# Patient Record
Sex: MALE | Race: WHITE | NOT HISPANIC OR LATINO | Employment: OTHER | URBAN - METROPOLITAN AREA
[De-identification: names, ages, dates, MRNs, and addresses within clinical notes are randomized per-mention and may not be internally consistent; named-entity substitution may affect disease eponyms.]

---

## 2018-01-11 ENCOUNTER — HOSPITAL ENCOUNTER (EMERGENCY)
Facility: HOSPITAL | Age: 48
Discharge: HOME | End: 2018-01-12
Attending: EMERGENCY MEDICINE | Admitting: EMERGENCY MEDICINE

## 2018-01-11 ENCOUNTER — APPOINTMENT (OUTPATIENT)
Dept: GENERAL RADIOLOGY | Facility: HOSPITAL | Age: 48
End: 2018-01-11

## 2018-01-11 DIAGNOSIS — R07.9 CHEST PAIN, UNSPECIFIED TYPE: Primary | ICD-10-CM

## 2018-01-11 LAB
ALBUMIN SERPL-MCNC: 5.1 G/DL (ref 3.2–4.8)
ALBUMIN/GLOB SERPL: 1.7 G/DL (ref 1.5–2.5)
ALP SERPL-CCNC: 71 U/L (ref 25–100)
ALT SERPL W P-5'-P-CCNC: 57 U/L (ref 7–40)
ANION GAP SERPL CALCULATED.3IONS-SCNC: 10 MMOL/L (ref 3–11)
AST SERPL-CCNC: 37 U/L (ref 0–33)
BASOPHILS # BLD AUTO: 0.02 10*3/MM3 (ref 0–0.2)
BASOPHILS NFR BLD AUTO: 0.2 % (ref 0–1)
BILIRUB SERPL-MCNC: 0.4 MG/DL (ref 0.3–1.2)
BNP SERPL-MCNC: 6 PG/ML (ref 0–100)
BUN BLD-MCNC: 20 MG/DL (ref 9–23)
BUN/CREAT SERPL: 18.2 (ref 7–25)
CALCIUM SPEC-SCNC: 9.9 MG/DL (ref 8.7–10.4)
CHLORIDE SERPL-SCNC: 104 MMOL/L (ref 99–109)
CO2 SERPL-SCNC: 22 MMOL/L (ref 20–31)
CREAT BLD-MCNC: 1.1 MG/DL (ref 0.6–1.3)
DEPRECATED RDW RBC AUTO: 45.5 FL (ref 37–54)
EOSINOPHIL # BLD AUTO: 0.16 10*3/MM3 (ref 0–0.3)
EOSINOPHIL NFR BLD AUTO: 1.7 % (ref 0–3)
ERYTHROCYTE [DISTWIDTH] IN BLOOD BY AUTOMATED COUNT: 13.4 % (ref 11.3–14.5)
GFR SERPL CREATININE-BSD FRML MDRD: 71 ML/MIN/1.73
GLOBULIN UR ELPH-MCNC: 3 GM/DL
GLUCOSE BLD-MCNC: 114 MG/DL (ref 70–100)
HCT VFR BLD AUTO: 45.1 % (ref 38.9–50.9)
HGB BLD-MCNC: 15.3 G/DL (ref 13.1–17.5)
IMM GRANULOCYTES # BLD: 0.05 10*3/MM3 (ref 0–0.03)
IMM GRANULOCYTES NFR BLD: 0.5 % (ref 0–0.6)
LIPASE SERPL-CCNC: 51 U/L (ref 6–51)
LYMPHOCYTES # BLD AUTO: 3.38 10*3/MM3 (ref 0.6–4.8)
LYMPHOCYTES NFR BLD AUTO: 36.1 % (ref 24–44)
MCH RBC QN AUTO: 31.6 PG (ref 27–31)
MCHC RBC AUTO-ENTMCNC: 33.9 G/DL (ref 32–36)
MCV RBC AUTO: 93.2 FL (ref 80–99)
MONOCYTES # BLD AUTO: 0.73 10*3/MM3 (ref 0–1)
MONOCYTES NFR BLD AUTO: 7.8 % (ref 0–12)
NEUTROPHILS # BLD AUTO: 5.01 10*3/MM3 (ref 1.5–8.3)
NEUTROPHILS NFR BLD AUTO: 53.7 % (ref 41–71)
PLATELET # BLD AUTO: 245 10*3/MM3 (ref 150–450)
PMV BLD AUTO: 11 FL (ref 6–12)
POTASSIUM BLD-SCNC: 3.6 MMOL/L (ref 3.5–5.5)
PROT SERPL-MCNC: 8.1 G/DL (ref 5.7–8.2)
RBC # BLD AUTO: 4.84 10*6/MM3 (ref 4.2–5.76)
SODIUM BLD-SCNC: 136 MMOL/L (ref 132–146)
TROPONIN I SERPL-MCNC: 0 NG/ML (ref 0–0.07)
WBC NRBC COR # BLD: 9.35 10*3/MM3 (ref 3.5–10.8)

## 2018-01-11 PROCEDURE — 85025 COMPLETE CBC W/AUTO DIFF WBC: CPT | Performed by: EMERGENCY MEDICINE

## 2018-01-11 PROCEDURE — 96374 THER/PROPH/DIAG INJ IV PUSH: CPT

## 2018-01-11 PROCEDURE — 83690 ASSAY OF LIPASE: CPT | Performed by: EMERGENCY MEDICINE

## 2018-01-11 PROCEDURE — 83880 ASSAY OF NATRIURETIC PEPTIDE: CPT | Performed by: EMERGENCY MEDICINE

## 2018-01-11 PROCEDURE — 93005 ELECTROCARDIOGRAM TRACING: CPT

## 2018-01-11 PROCEDURE — 80053 COMPREHEN METABOLIC PANEL: CPT | Performed by: EMERGENCY MEDICINE

## 2018-01-11 PROCEDURE — 84484 ASSAY OF TROPONIN QUANT: CPT

## 2018-01-11 PROCEDURE — 71045 X-RAY EXAM CHEST 1 VIEW: CPT

## 2018-01-11 PROCEDURE — 99284 EMERGENCY DEPT VISIT MOD MDM: CPT

## 2018-01-11 RX ORDER — ASPIRIN 81 MG/1
81 TABLET ORAL DAILY
COMMUNITY

## 2018-01-11 RX ORDER — SODIUM CHLORIDE 0.9 % (FLUSH) 0.9 %
10 SYRINGE (ML) INJECTION AS NEEDED
Status: DISCONTINUED | OUTPATIENT
Start: 2018-01-11 | End: 2018-01-12 | Stop reason: HOSPADM

## 2018-01-11 RX ORDER — FAMOTIDINE 10 MG/ML
20 INJECTION, SOLUTION INTRAVENOUS ONCE
Status: COMPLETED | OUTPATIENT
Start: 2018-01-11 | End: 2018-01-11

## 2018-01-11 RX ORDER — ALUMINA, MAGNESIA, AND SIMETHICONE 2400; 2400; 240 MG/30ML; MG/30ML; MG/30ML
15 SUSPENSION ORAL ONCE
Status: COMPLETED | OUTPATIENT
Start: 2018-01-11 | End: 2018-01-11

## 2018-01-11 RX ORDER — ASPIRIN 81 MG/1
324 TABLET, CHEWABLE ORAL ONCE
Status: COMPLETED | OUTPATIENT
Start: 2018-01-11 | End: 2018-01-11

## 2018-01-11 RX ORDER — OMEPRAZOLE 20 MG/1
20 CAPSULE, DELAYED RELEASE ORAL DAILY
COMMUNITY

## 2018-01-11 RX ADMIN — FAMOTIDINE 20 MG: 10 INJECTION, SOLUTION INTRAVENOUS at 23:42

## 2018-01-11 RX ADMIN — ALUMINUM HYDROXIDE, MAGNESIUM HYDROXIDE, AND DIMETHICONE 15 ML: 400; 400; 40 SUSPENSION ORAL at 23:42

## 2018-01-11 RX ADMIN — LIDOCAINE HYDROCHLORIDE 15 ML: 20 SOLUTION ORAL; TOPICAL at 23:42

## 2018-01-11 RX ADMIN — ASPIRIN 81 MG 81 MG: 81 TABLET ORAL at 22:54

## 2018-01-12 VITALS
DIASTOLIC BLOOD PRESSURE: 91 MMHG | SYSTOLIC BLOOD PRESSURE: 126 MMHG | RESPIRATION RATE: 18 BRPM | WEIGHT: 190 LBS | HEIGHT: 66 IN | TEMPERATURE: 98.6 F | BODY MASS INDEX: 30.53 KG/M2 | HEART RATE: 62 BPM | OXYGEN SATURATION: 95 %

## 2018-01-12 LAB
HOLD SPECIMEN: NORMAL
HOLD SPECIMEN: NORMAL
TROPONIN I SERPL-MCNC: 0 NG/ML (ref 0–0.07)
WHOLE BLOOD HOLD SPECIMEN: NORMAL
WHOLE BLOOD HOLD SPECIMEN: NORMAL

## 2018-01-12 PROCEDURE — 84484 ASSAY OF TROPONIN QUANT: CPT

## 2018-01-12 PROCEDURE — 93005 ELECTROCARDIOGRAM TRACING: CPT | Performed by: EMERGENCY MEDICINE

## 2018-01-12 RX ORDER — SUCRALFATE 1 G/1
1 TABLET ORAL
Qty: 60 TABLET | Refills: 0 | Status: SHIPPED | OUTPATIENT
Start: 2018-01-12 | End: 2022-09-15 | Stop reason: HOSPADM

## 2018-01-12 RX ORDER — RANITIDINE 150 MG/1
150 TABLET ORAL 2 TIMES DAILY
Qty: 30 TABLET | Refills: 0 | OUTPATIENT
Start: 2018-01-12 | End: 2021-12-20

## 2018-01-12 NOTE — ED PROVIDER NOTES
Subjective   HPI Comments: Mr. Geovanny Barnett is a 47 y.o. male who presents to the ED with c/o chest pain. He notes of left sided chest pain and palpitation since 2200 tonight. He was laying down and watching TV when his symptoms onset and became concerned he was having a heart attack so he comes to the ER for evaluation. He states his heart was pounding hard, and now has some neck pressure and nausea. He does note of increased heat in his face and on his chest as well. He denies any other acute sx at this time. He does have a hx of GERD and MI. He was seen in [place] ER 2 days ago for the same sx with a negative workup.     Patient is a 47 y.o. male presenting with chest pain.   History provided by:  Patient  Chest Pain   Pain location:  L chest  Onset quality:  Sudden  Duration: Onset 2200 today.  Timing:  Unable to specify  Progression:  Unchanged  Chronicity:  New  Relieved by:  None tried  Worsened by:  Nothing  Ineffective treatments:  None tried  Associated symptoms: nausea and palpitations    Associated symptoms: no diaphoresis, no shortness of breath and no vomiting        Review of Systems   Constitutional: Negative for chills and diaphoresis.   Respiratory: Negative for shortness of breath.    Cardiovascular: Positive for chest pain and palpitations.   Gastrointestinal: Positive for nausea. Negative for vomiting.   Musculoskeletal: Positive for neck pain (pressure).   All other systems reviewed and are negative.      Past Medical History:   Diagnosis Date   • GERD (gastroesophageal reflux disease)    • Gout    • Hyperlipidemia    • Myocardial infarction        No Known Allergies    Past Surgical History:   Procedure Laterality Date   • CORONARY ANGIOPLASTY WITH STENT PLACEMENT     • HIP SURGERY Bilateral        History reviewed. No pertinent family history.    Social History     Social History   • Marital status: Single     Spouse name: N/A   • Number of children: N/A   • Years of education: N/A      Social History Main Topics   • Smoking status: Current Some Day Smoker   • Smokeless tobacco: None   • Alcohol use 7.2 oz/week     12 Cans of beer per week   • Drug use: No   • Sexual activity: Not Asked     Other Topics Concern   • None     Social History Narrative   • None         Objective   Physical Exam   Constitutional: He is oriented to person, place, and time. He appears well-developed and well-nourished. No distress.   HENT:   Head: Normocephalic and atraumatic.   Nose: Nose normal.   Eyes: Conjunctivae are normal. No scleral icterus.   Neck: Normal range of motion. Neck supple.   Cardiovascular: Normal rate, regular rhythm, normal heart sounds and intact distal pulses.    No murmur heard.  Pulmonary/Chest: Effort normal and breath sounds normal. No respiratory distress.   Abdominal: Soft. There is no tenderness.   Musculoskeletal: Normal range of motion.   Neurological: He is alert and oriented to person, place, and time.   Skin: Skin is warm and dry. He is not diaphoretic.   Psychiatric: He has a normal mood and affect. His behavior is normal.   Nursing note and vitals reviewed.      Procedures         ED Course  ED Course     EKG NSR.  CXR negative.  Labs benign.  Troponin negative x2.  Pain improved with GI meds.  Pt has history of CAD and of GERD.  This is not the same as his prior anginal pain.  Discussed results in detail with pt.  We discussed the benefits of inpatient vs outpatient workup of these symptoms.  He prefers outpatient which I think is reasonable.  Will treat with GI meds since it seemed to help but we discussed that close followup with Kelly is most important.                    MDM  Number of Diagnoses or Management Options  Chest pain, unspecified type:      Amount and/or Complexity of Data Reviewed  Clinical lab tests: ordered and reviewed  Tests in the radiology section of CPT®: reviewed and ordered  Independent visualization of images, tracings, or specimens: yes        Final  diagnoses:   Chest pain, unspecified type       Documentation assistance provided by geo SINHA.  Information recorded by the [email or web address] was done at my direction and has been verified and validated by me.     Blanquita Sinha  01/11/18 0837       Celestine Tovar MD  01/12/18 9411

## 2019-11-18 ENCOUNTER — OFFICE VISIT (OUTPATIENT)
Dept: CARDIAC SURGERY | Facility: CLINIC | Age: 49
End: 2019-11-18

## 2019-11-18 VITALS
DIASTOLIC BLOOD PRESSURE: 80 MMHG | HEART RATE: 77 BPM | HEIGHT: 66 IN | WEIGHT: 204 LBS | TEMPERATURE: 98.7 F | OXYGEN SATURATION: 98 % | SYSTOLIC BLOOD PRESSURE: 138 MMHG | BODY MASS INDEX: 32.78 KG/M2

## 2019-11-18 DIAGNOSIS — R91.1 LUNG NODULE: Primary | ICD-10-CM

## 2019-11-18 PROCEDURE — 99205 OFFICE O/P NEW HI 60 MIN: CPT | Performed by: THORACIC SURGERY (CARDIOTHORACIC VASCULAR SURGERY)

## 2019-11-18 NOTE — PROGRESS NOTES
11/18/2019  Patient Information  Geovanny Barnett                                                                                          [address] [facility] [facility] [address]   1970  'PCP/Referring Physician'  David Jaffe MD  [phone]  David Jaffe MD  [phone]  Chief Complaint   Patient presents with   • Consult     New patient per Dr. david Jaffe for left upper lung nodule. Nodule found by accident while pt was having a CT done for a  kidney stone.       History of Present Illness:   The patient is a 49-year-old white male who is being referred for 9 mm lingular nodule.  This recently was found on evaluation for a kidney stone.  Patient has a long smoking history but has been quit for a while.  He denies any hemoptysis or shortness of breath or pulmonary symptoms at the current time.  He denies a family history of lung cancer.  The nodule was 9 mm, irregular, noncalcified and concerning for malignancy.      Patient Active Problem List   Diagnosis   • Lung nodule     Past Medical History:   Diagnosis Date   • GERD (gastroesophageal reflux disease)    • Gout    • Hyperlipidemia    • Myocardial infarction (CMS/HCC)      Past Surgical History:   Procedure Laterality Date   • CORONARY ANGIOPLASTY WITH STENT PLACEMENT     • HIP SURGERY Bilateral        Current Outpatient Medications:   •  ALLOPURINOL PO, Take  by mouth., Disp: , Rfl:   •  aspirin 81 MG EC tablet, Take 81 mg by mouth Daily., Disp: , Rfl:   •  omeprazole (priLOSEC) 20 MG capsule, Take 20 mg by mouth Daily., Disp: , Rfl:   •  raNITIdine (ZANTAC) 150 MG tablet, Take 1 tablet by mouth 2 (Two) Times a Day., Disp: 30 tablet, Rfl: 0  •  sucralfate (CARAFATE) 1 g tablet, Take 1 tablet by mouth 4 (Four) Times a Day Before Meals & at Bedtime., Disp: 60 tablet, Rfl: 0  No Known Allergies  Social History     Socioeconomic History   • Marital status: Single     Spouse name: Not on file   • Number of children: Not on file   • Years of  education: Not on file   • Highest education level: Not on file   Occupational History   • Occupation:      Comment: Disabled   Tobacco Use   • Smoking status: Former Smoker     Packs/day: 0.25     Years: 19.00     Pack years: 4.75     Last attempt to quit: 2016     Years since quitting: 3.8   • Smokeless tobacco: Never Used   Substance and Sexual Activity   • Alcohol use: Yes     Alcohol/week: 7.2 oz     Types: 12 Cans of beer per week   • Drug use: No   Social History Narrative    L[facility] in [facility]     Family History   Problem Relation Age of Onset   • Hypertension Mother    • Diabetes Mother    • Cancer Mother    • Cancer Father    • Heart valve disorder Father      Review of Systems   Constitution: Negative for chills, fever, malaise/fatigue, night sweats and weight loss.   HENT: Negative for congestion, hearing loss, nosebleeds and odynophagia.    Cardiovascular: Negative for chest pain, claudication, dyspnea on exertion, leg swelling, orthopnea, palpitations and syncope.   Respiratory: Negative for cough, hemoptysis, shortness of breath and wheezing.    Endocrine: Negative for cold intolerance, heat intolerance, polydipsia, polyphagia and polyuria.   Hematologic/Lymphatic: Does not bruise/bleed easily.   Skin: Negative for itching, poor wound healing and rash.   Musculoskeletal: Positive for arthritis, gout, joint pain (bilateral wrist and elbows) and muscle cramps (hamstrings bilateral). Negative for back pain, joint swelling and myalgias.   Gastrointestinal: Negative for abdominal pain, constipation, diarrhea, hematemesis, melena, nausea and vomiting.   Genitourinary: Negative for dysuria, frequency, hematuria, nocturia and urgency.   Neurological: Negative for dizziness, light-headedness, loss of balance and numbness.   Psychiatric/Behavioral: Negative for depression and suicidal ideas. The patient is not nervous/anxious.    Allergic/Immunologic: Positive for environmental  "allergies. Negative for HIV exposure.     Vitals:    11/18/19 1158   BP: 138/80   Pulse: 77   Temp: 98.7 °F (37.1 °C)   TempSrc: Temporal   SpO2: 98%   Weight: 92.5 kg (204 lb)   Height: 167.6 cm (66\")      Physical Exam   Constitutional: He is oriented to person, place, and time. He appears well-developed and well-nourished. No distress.   HENT:   Head: Normocephalic.   Eyes: EOM are normal. Pupils are equal, round, and reactive to light.   Neck: Normal range of motion. Carotid bruit is not present. No thyromegaly present.   Cardiovascular: Normal rate and regular rhythm. Exam reveals no gallop and no friction rub.   No murmur heard.  Pulmonary/Chest: He has no wheezes. He has no rales.   Abdominal: Soft. Bowel sounds are normal. He exhibits no distension and no mass. There is no hepatomegaly. There is no tenderness.   Musculoskeletal: Normal range of motion. He exhibits no deformity.   Neurological: He is alert and oriented to person, place, and time. He has normal strength. No cranial nerve deficit or sensory deficit.   Skin: No bruising and no petechiae noted. No cyanosis. Nails show no clubbing.   Psychiatric: He has a normal mood and affect.     Labs/Imaging:  I obtained and reviewed medical records from Dr. Jaffe including the CT scan demonstrating a 9 mm irregular, noncalcified pulmonary nodule in the lingula.    Assessment/Plan:   The patient is a 49-year-old male who has been referred for a 9 mm pulmonary nodule.  I have obtained and reviewed the CT scan.  I concur with a 9 mm irregular noncalcified pulmonary nodule in the lingula.  This certainly is very concerning.  I do think that we need to proceed with a PET scan.  I think it is too small for successful needle biopsy.  Depending on the results of the PET scan, further recommendations will be entertained.  I have answered all of the patient's questions and he appears to be fully informed and agreeable.  He is not smoking now.    Patient Active " Problem List   Diagnosis   • Lung nodule     CC: MD Elda Mays, , editing for Fer Zhou M.D.    I, Fer Zhou MD, have read and agree with the editing done by Elda Nolasco, .

## 2019-11-21 DIAGNOSIS — Z00.6 EXAMINATION FOR NORMAL COMPARISON FOR CLINICAL RESEARCH: Primary | ICD-10-CM

## 2019-12-03 DIAGNOSIS — Z00.6 EXAMINATION FOR NORMAL COMPARISON FOR CLINICAL RESEARCH: Primary | ICD-10-CM

## 2019-12-05 ENCOUNTER — HOSPITAL ENCOUNTER (OUTPATIENT)
Dept: PET IMAGING | Facility: HOSPITAL | Age: 49
Discharge: HOME | End: 2019-12-05
Admitting: PHYSICIAN ASSISTANT

## 2019-12-05 ENCOUNTER — HOSPITAL ENCOUNTER (OUTPATIENT)
Dept: PET IMAGING | Facility: HOSPITAL | Age: 49
Discharge: HOME | End: 2019-12-05

## 2019-12-05 LAB — GLUCOSE BLDC GLUCOMTR-MCNC: 116 MG/DL (ref 70–130)

## 2019-12-05 PROCEDURE — 0 FLUDEOXYGLUCOSE F18 SOLUTION: Performed by: PHYSICIAN ASSISTANT

## 2019-12-05 PROCEDURE — A9552 F18 FDG: HCPCS | Performed by: PHYSICIAN ASSISTANT

## 2019-12-05 PROCEDURE — 82962 GLUCOSE BLOOD TEST: CPT

## 2019-12-05 PROCEDURE — 78815 PET IMAGE W/CT SKULL-THIGH: CPT

## 2019-12-05 RX ADMIN — FLUDEOXYGLUCOSE F18 1 DOSE: 300 INJECTION INTRAVENOUS at 12:05

## 2019-12-09 ENCOUNTER — OFFICE VISIT (OUTPATIENT)
Dept: CARDIAC SURGERY | Facility: CLINIC | Age: 49
End: 2019-12-09

## 2019-12-09 VITALS
HEART RATE: 69 BPM | WEIGHT: 203 LBS | BODY MASS INDEX: 32.62 KG/M2 | TEMPERATURE: 98.6 F | HEIGHT: 66 IN | OXYGEN SATURATION: 97 % | SYSTOLIC BLOOD PRESSURE: 146 MMHG | DIASTOLIC BLOOD PRESSURE: 86 MMHG

## 2019-12-09 DIAGNOSIS — R91.1 LUNG NODULE: Primary | ICD-10-CM

## 2019-12-09 PROCEDURE — 99212 OFFICE O/P EST SF 10 MIN: CPT | Performed by: THORACIC SURGERY (CARDIOTHORACIC VASCULAR SURGERY)

## 2019-12-09 NOTE — PROGRESS NOTES
12/09/2019  Patient Information  Geovanny Barnett                                                                                          [address] [place] [address]   1970  'PCP/Referring Physician'  David Jaffe MD  [phone]  No ref. provider found    Chief Complaint   Patient presents with   • Follow-up     Follow up to discuss PET scan results.   • Lung Nodule       History of Present Illness:   The patient returns today for follow-up of his PET scan results.  He is not smoking.  He has been feeling reasonably well.  He denies hemoptysis or pulmonary symptoms.      Patient Active Problem List   Diagnosis   • Lung nodule     Past Medical History:   Diagnosis Date   • GERD (gastroesophageal reflux disease)    • Gout    • Hyperlipidemia    • Myocardial infarction (CMS/HCC)      Past Surgical History:   Procedure Laterality Date   • CORONARY ANGIOPLASTY WITH STENT PLACEMENT     • HIP SURGERY Bilateral        Current Outpatient Medications:   •  ALLOPURINOL PO, Take  by mouth., Disp: , Rfl:   •  aspirin 81 MG EC tablet, Take 81 mg by mouth Daily., Disp: , Rfl:   •  omeprazole (priLOSEC) 20 MG capsule, Take 20 mg by mouth Daily., Disp: , Rfl:   •  raNITIdine (ZANTAC) 150 MG tablet, Take 1 tablet by mouth 2 (Two) Times a Day., Disp: 30 tablet, Rfl: 0  •  sucralfate (CARAFATE) 1 g tablet, Take 1 tablet by mouth 4 (Four) Times a Day Before Meals & at Bedtime., Disp: 60 tablet, Rfl: 0  No Known Allergies  Social History     Socioeconomic History   • Marital status: Single     Spouse name: Not on file   • Number of children: Not on file   • Years of education: Not on file   • Highest education level: Not on file   Occupational History   • Occupation:      Comment: Disabled   Tobacco Use   • Smoking status: Former Smoker     Packs/day: 0.25     Years: 19.00     Pack years: 4.75     Last attempt to quit: 2016     Years since quitting: 3.9   • Smokeless tobacco: Never Used  "  Substance and Sexual Activity   • Alcohol use: Yes     Alcohol/week: 12.0 standard drinks     Types: 12 Cans of beer per week   • Drug use: No   Social History Narrative    Lives in [facility]     Family History   Problem Relation Age of Onset   • Hypertension Mother    • Diabetes Mother    • Cancer Mother    • Cancer Father    • Heart valve disorder Father      Review of Systems   Constitution: Negative. Negative for chills, fever, malaise/fatigue, night sweats and weight loss.   HENT: Positive for sore throat. Negative for hearing loss and odynophagia.    Eyes: Negative.    Cardiovascular: Negative for chest pain, dyspnea on exertion, leg swelling, orthopnea and palpitations.   Respiratory: Negative.  Negative for cough and hemoptysis.    Endocrine: Negative.  Negative for cold intolerance, heat intolerance, polydipsia, polyphagia and polyuria.   Hematologic/Lymphatic: Bruises/bleeds easily.   Skin: Negative.  Negative for itching and rash.   Musculoskeletal: Positive for arthritis, joint pain and muscle cramps. Negative for joint swelling and myalgias.   Gastrointestinal: Negative.  Negative for abdominal pain, constipation, diarrhea, hematemesis, hematochezia, melena, nausea and vomiting.   Genitourinary: Negative.  Negative for dysuria, frequency and hematuria.   Neurological: Negative for focal weakness, headaches, numbness and seizures.   Psychiatric/Behavioral: Negative.  Negative for suicidal ideas.   Allergic/Immunologic: Positive for environmental allergies.   All other systems reviewed and are negative.    Vitals:    12/09/19 1159   BP: 146/86   BP Location: Right arm   Patient Position: Sitting   Pulse: 69   Temp: 98.6 °F (37 °C)   SpO2: 97%   Weight: 92.1 kg (203 lb)   Height: 167.6 cm (66\")      Physical Exam   Neck: Normal range of motion. Neck supple. No JVD present. No tracheal deviation present. No thyromegaly present.   Cardiovascular: Normal rate and regular rhythm. Exam reveals no " gallop and no friction rub.   No murmur heard.  Pulmonary/Chest: No stridor. No respiratory distress. He has no wheezes. He has no rales. He exhibits no tenderness.   Abdominal: Soft. Bowel sounds are normal. There is no tenderness.   Lymphadenopathy:     He has no cervical adenopathy.     Assessment/Plan:   The patient returns today for follow-up of the results of his PET scan.  He had his PET scan done and this reveals no uptake in the lingular nodule.  We will see him back in 4 months and repeat a chest CT scan at that time.  I have answered all of his questions.    Patient Active Problem List   Diagnosis   • Lung nodule     CC: MD Elda Mays, , editing for Fer Zhou M.D.    I, Fer Zhou MD, have read and agree with the editing done by Elda Nolasco, .

## 2020-04-16 ENCOUNTER — TELEPHONE (OUTPATIENT)
Dept: CARDIAC SURGERY | Facility: CLINIC | Age: 50
End: 2020-04-16

## 2020-06-19 ENCOUNTER — TELEPHONE (OUTPATIENT)
Dept: CARDIAC SURGERY | Facility: CLINIC | Age: 50
End: 2020-06-19

## 2020-07-01 ENCOUNTER — TELEPHONE (OUTPATIENT)
Dept: CARDIAC SURGERY | Facility: CLINIC | Age: 50
End: 2020-07-01

## 2020-07-06 DIAGNOSIS — R91.1 LUNG NODULE: Primary | ICD-10-CM

## 2020-07-20 DIAGNOSIS — R91.1 LUNG NODULE: Primary | ICD-10-CM

## 2020-07-29 ENCOUNTER — HOSPITAL ENCOUNTER (OUTPATIENT)
Dept: CT IMAGING | Facility: HOSPITAL | Age: 50
Discharge: HOME | End: 2020-07-29
Admitting: PHYSICIAN ASSISTANT

## 2020-07-29 DIAGNOSIS — R91.1 LUNG NODULE: ICD-10-CM

## 2020-07-29 PROCEDURE — 71250 CT THORAX DX C-: CPT

## 2020-08-31 ENCOUNTER — OFFICE VISIT (OUTPATIENT)
Dept: CARDIAC SURGERY | Facility: CLINIC | Age: 50
End: 2020-08-31

## 2020-08-31 VITALS
HEART RATE: 65 BPM | BODY MASS INDEX: 32.17 KG/M2 | HEIGHT: 66 IN | WEIGHT: 200.2 LBS | DIASTOLIC BLOOD PRESSURE: 95 MMHG | SYSTOLIC BLOOD PRESSURE: 141 MMHG | OXYGEN SATURATION: 97 % | TEMPERATURE: 98.6 F

## 2020-08-31 DIAGNOSIS — R91.1 LUNG NODULE: Primary | ICD-10-CM

## 2020-08-31 PROCEDURE — 99213 OFFICE O/P EST LOW 20 MIN: CPT | Performed by: NURSE PRACTITIONER

## 2020-08-31 RX ORDER — PANTOPRAZOLE SODIUM 20 MG/1
TABLET, DELAYED RELEASE ORAL
Status: ON HOLD | COMMUNITY
End: 2022-09-13

## 2020-08-31 RX ORDER — ROSUVASTATIN CALCIUM 40 MG/1
40 TABLET, COATED ORAL NIGHTLY
COMMUNITY

## 2020-08-31 NOTE — PROGRESS NOTES
[facility] Cardiothoracic Surgery Follow-Up Note    Name:  Geovanny Barnett  MRN Number:  [id number]  Date of Encounter:  08/31/2020    Referred By:  No ref. provider found  PCP:  David Jaffe MD    Chief Complaint:    Chief Complaint   Patient presents with   • Follow-up     4 month follow up with CT chest for lung nodule       History of Present Illness:    Mr. Geovanny Barnett is a pleasant 50 y.o. male who presents today for evaluation of  a 9 mm lingular nodule.  This recently was found on evaluation for a kidney stone.  Patient has a long smoking history but has been quit for a while.  He denies any hemoptysis or shortness of breath or pulmonary symptoms at the current time.  He denies a family history of lung cancer.  The nodule was 9 mm, irregular, noncalcified and concerning for malignancy.  The lesion was negative on PET scan and the patient returns the office today to discuss the results of his recent CT scan of the chest.  He denies fever, chills, weight loss, night   Sweats, lymphadenopathy and hemoptysis.  Otherwise, he is doing well.    Review of Systems:  Review of Systems   Constitution: Negative for chills, diaphoresis, fever, malaise/fatigue and weight loss.   HENT: Negative for congestion, hearing loss, hoarse voice and sore throat.    Eyes: Negative for blurred vision and double vision.   Cardiovascular: Negative for chest pain, claudication, dyspnea on exertion, leg swelling, palpitations and syncope.   Respiratory: Negative for cough, hemoptysis, shortness of breath and wheezing.    Hematologic/Lymphatic: Does not bruise/bleed easily.   Skin: Negative for itching, poor wound healing and rash.   Musculoskeletal: Positive for arthritis, joint pain (Fingers), joint swelling and muscle cramps. Negative for back pain, falls, muscle weakness and neck pain.   Gastrointestinal: Negative for abdominal pain, constipation, diarrhea, hematemesis, nausea and vomiting.   Genitourinary: Negative  for dysuria, frequency, hematuria, hesitancy, incomplete emptying and urgency.   Neurological: Negative for dizziness, headaches, light-headedness, loss of balance, numbness and vertigo.   Psychiatric/Behavioral: Negative for depression, memory loss and substance abuse. The patient is not nervous/anxious.    Allergic/Immunologic: Positive for environmental allergies. Negative for HIV exposure.       Past Medical History:    Past Medical History:   Diagnosis Date   • GERD (gastroesophageal reflux disease)    • Gout    • Hyperlipidemia    • Myocardial infarction (CMS/HCC)        Past Surgical History:    Past Surgical History:   Procedure Laterality Date   • CORONARY ANGIOPLASTY WITH STENT PLACEMENT     • HIP SURGERY Bilateral        Patient Active Problem List   Diagnosis   • Lung nodule     Social History     Tobacco Use   • Smoking status: Former Smoker     Packs/day: 0.25     Years: 19.00     Pack years: 4.75     Last attempt to quit: 2016     Years since quittin.6   • Smokeless tobacco: Never Used   Substance Use Topics   • Alcohol use: Yes     Alcohol/week: 12.0 standard drinks     Types: 12 Cans of beer per week   • Drug use: No     Family History   Problem Relation Age of Onset   • Hypertension Mother    • Diabetes Mother    • Cancer Mother    • Cancer Father    • Heart valve disorder Father        Medications:      Current Outpatient Medications:   •  ALLOPURINOL PO, Take  by mouth., Disp: , Rfl:   •  aspirin 81 MG EC tablet, Take 81 mg by mouth Daily., Disp: , Rfl:   •  pantoprazole (PROTONIX) 20 MG EC tablet, pantoprazole, Disp: , Rfl:   •  rosuvastatin (Crestor) 40 MG tablet, Take 40 mg by mouth Daily., Disp: , Rfl:   •  sucralfate (CARAFATE) 1 g tablet, Take 1 tablet by mouth 4 (Four) Times a Day Before Meals & at Bedtime., Disp: 60 tablet, Rfl: 0  •  omeprazole (priLOSEC) 20 MG capsule, Take 20 mg by mouth Daily., Disp: , Rfl:   •  raNITIdine (ZANTAC) 150 MG tablet, Take 1 tablet by mouth 2 (Two)  "Times a Day., Disp: 30 tablet, Rfl: 0    Allergies:  No Known Allergies    Physical Exam:  Vital Signs:    Vitals:    08/31/20 1242   BP: 141/95   Pulse: 65   Temp: 98.6 °F (37 °C)   TempSrc: Infrared   SpO2: 97%   Weight: 90.8 kg (200 lb 3.2 oz)   Height: 167.6 cm (66\")       Physical Exam   Gen- NAD, pleasant, cooperative  CV- Regular rate and rhythm, no murmur, gallop or rub  Pulm- Clear to auscultation bilateral without wheeze or rhonchi  GI- Soft, normoactive bowel sounds, non-tender  Ext- Without edema   Neuro- CN II- XII grossly intact, tongue midline, voice normal.    Labs/Imaging:  CT Chest W/WO Contrast, [facility], 7/29/20  Impression:  Stable 1 cm noncalcified nodule seen within the left upper lobe inferiorly.  There is no additional parenchymal consolidation with degenerative changes seen within the spine.  Coronary artery calcification is identified.  I personally reviewed these images in the office today and note the only other nodules measures closer to approximately 9 to 10 mm.  This does remain stable.    Assessment / Plan:  Mr. Geovanny Barnett is a pleasant 50 y.o. male who presents today for evaluation of  a 9 mm lingular nodule.  This recently was found on evaluation for a kidney stone.  Patient has a long smoking history but has been quit for a while.  He denies any hemoptysis or shortness of breath or pulmonary symptoms at the current time.  He denies a family history of lung cancer.  The nodule was 9 mm, irregular, noncalcified and concerning for malignancy.  The lesion was negative on PET scan and the patient returns the office today to discuss the results of his recent CT scan of the chest.  He denies fever, chills, weight loss, night   Sweats, lymphadenopathy and hemoptysis.  I have discussed with the patient the results of his recent CT scan of the chest.  This nodule was measured at approximately 9 to 10 mm and is stable.  At this time, I will plan to see him back " in approximately 6 months with repeat CT scan of the chest.  Should he develop any acutely worsening symptoms, he will contact her office.    Please note, this document was produced using voice recognition software.    IRVIN Zepeda  [facility] Cardiothoracic Surgery

## 2021-02-25 ENCOUNTER — TELEPHONE (OUTPATIENT)
Dept: CARDIAC SURGERY | Facility: CLINIC | Age: 51
End: 2021-02-25

## 2021-03-09 ENCOUNTER — TELEPHONE (OUTPATIENT)
Dept: CARDIAC SURGERY | Facility: CLINIC | Age: 51
End: 2021-03-09

## 2021-03-09 NOTE — TELEPHONE ENCOUNTER
PATIENT READY FOR RECALL APPOINTMENT. PATIENT'S INSURANCE, ADDRESS, AND PHONE NUMBER VERIFIED.     **PATIENT WOULD LIKE ANY TESTING DONE AT [facility]

## 2021-03-11 DIAGNOSIS — R91.1 LUNG NODULE: Primary | ICD-10-CM

## 2021-03-17 DIAGNOSIS — R91.1 LUNG NODULE: Primary | ICD-10-CM

## 2021-04-07 ENCOUNTER — HOSPITAL ENCOUNTER (OUTPATIENT)
Dept: CT IMAGING | Facility: HOSPITAL | Age: 51
Discharge: HOME | End: 2021-04-07
Admitting: PHYSICIAN ASSISTANT

## 2021-04-07 DIAGNOSIS — R91.1 LUNG NODULE: ICD-10-CM

## 2021-04-07 PROCEDURE — 71250 CT THORAX DX C-: CPT

## 2021-04-08 ENCOUNTER — OFFICE VISIT (OUTPATIENT)
Dept: CARDIAC SURGERY | Facility: CLINIC | Age: 51
End: 2021-04-08

## 2021-04-08 VITALS
TEMPERATURE: 97.8 F | OXYGEN SATURATION: 98 % | DIASTOLIC BLOOD PRESSURE: 61 MMHG | BODY MASS INDEX: 33.43 KG/M2 | HEART RATE: 68 BPM | HEIGHT: 66 IN | WEIGHT: 208 LBS | SYSTOLIC BLOOD PRESSURE: 120 MMHG

## 2021-04-08 DIAGNOSIS — R91.1 LUNG NODULE: Primary | ICD-10-CM

## 2021-04-08 PROCEDURE — 99213 OFFICE O/P EST LOW 20 MIN: CPT | Performed by: THORACIC SURGERY (CARDIOTHORACIC VASCULAR SURGERY)

## 2021-04-08 NOTE — PROGRESS NOTES
04/08/2021  Patient Information  Geovanny Barnett                                                                                          [address] [facility] [facility] [address]   1970  'PCP/Referring Physician'  David Jaffe MD  No ref. provider found  Chief Complaint   Patient presents with   • Follow-up     6 mo f/u with CT chest        CC: I feel good    History of Present Illness: 51-year-old  male being seen in follow-up of a pulmonary nodule in the left lung (lingula).  This nodule was first identified 1-1/2 years ago on a routine chest x-ray.  Subsequent CT scan and PET scan were obtained and I have reviewed.  The PET scan shows no significant metabolic activity.  The CT scan shows a 9 mm irregular lesion in the lingula without significant calcification.  The patient states that he may have a history of histoplasmosis in childhood but he is not certain.  He has not had any other significant symptoms.  He denies cough, hemoptysis, and weight loss.  He stopped smoking 5 years ago.  He does have a history of coronary artery disease (status post stent placement).  I have reviewed his most recent CT scan.  The nodule in question is unchanged from prior examinations.  On close examination there may be some calcification in the lower aspect of this nodule.  CT scan of the chest is otherwise okay with no evidence of acute cardiovascular or pulmonary disease.      Patient Active Problem List   Diagnosis   • Lung nodule     Past Medical History:   Diagnosis Date   • GERD (gastroesophageal reflux disease)    • Gout    • Hyperlipidemia    • Myocardial infarction (CMS/HCC)      Past Surgical History:   Procedure Laterality Date   • CORONARY ANGIOPLASTY WITH STENT PLACEMENT     • HIP SURGERY Bilateral        Current Outpatient Medications:   •  ALLOPURINOL PO, Take  by mouth., Disp: , Rfl:   •  aspirin 81 MG EC tablet, Take 81 mg by mouth Daily., Disp: , Rfl:   •  omeprazole (priLOSEC) 20 MG  capsule, Take 20 mg by mouth Daily., Disp: , Rfl:   •  pantoprazole (PROTONIX) 20 MG EC tablet, pantoprazole, Disp: , Rfl:   •  raNITIdine (ZANTAC) 150 MG tablet, Take 1 tablet by mouth 2 (Two) Times a Day., Disp: 30 tablet, Rfl: 0  •  rosuvastatin (Crestor) 40 MG tablet, Take 40 mg by mouth Daily., Disp: , Rfl:   •  sucralfate (CARAFATE) 1 g tablet, Take 1 tablet by mouth 4 (Four) Times a Day Before Meals & at Bedtime., Disp: 60 tablet, Rfl: 0  No Known Allergies  Social History     Socioeconomic History   • Marital status: Single     Spouse name: Not on file   • Number of children: 0   • Years of education: Not on file   • Highest education level: Not on file   Tobacco Use   • Smoking status: Former Smoker     Packs/day: 0.25     Years: 19.00     Pack years: 4.75     Quit date:      Years since quittin.2   • Smokeless tobacco: Never Used   Substance and Sexual Activity   • Alcohol use: Yes     Alcohol/week: 12.0 standard drinks     Types: 12 Cans of beer per week   • Drug use: No     Family History   Problem Relation Age of Onset   • Hypertension Mother    • Diabetes Mother    • Cancer Mother    • Cancer Father    • Heart valve disorder Father        ROS, past medical history, surgical history, family history, social history and vitals  reviewed by me and corrected as needed.        Review of Systems   Constitutional: Negative for chills, fever, malaise/fatigue, night sweats and weight loss.   HENT: Negative for hearing loss, odynophagia and sore throat.    Cardiovascular: Negative for chest pain, dyspnea on exertion, leg swelling, orthopnea and palpitations.   Respiratory: Negative for cough and shortness of breath.    Hematologic/Lymphatic: Does not bruise/bleed easily.   Skin: Negative for itching and rash.   Musculoskeletal: Negative for arthritis, joint pain, joint swelling and myalgias.   Gastrointestinal: Negative for abdominal pain, constipation, diarrhea, hematemesis, hematochezia, nausea and  "vomiting.   Genitourinary: Negative for dysuria, frequency and hematuria.   Neurological: Negative for focal weakness, headaches, numbness and seizures.   Psychiatric/Behavioral: Negative for suicidal ideas.       Vitals:    04/08/21 0810   BP: 120/61   BP Location: Left arm   Pulse: 68   Temp: 97.8 °F (36.6 °C)   SpO2: 98%   Weight: 94.3 kg (208 lb)   Height: 167.6 cm (66\")        Physical Exam  Vitals and nursing note reviewed.   Constitutional:       General: He is not in acute distress.     Appearance: Normal appearance. He is normal weight.   HENT:      Head: Normocephalic and atraumatic.      Right Ear: External ear normal.      Left Ear: External ear normal.      Nose: Nose normal.      Mouth/Throat:      Mouth: Mucous membranes are moist.   Eyes:      Extraocular Movements: Extraocular movements intact.      Pupils: Pupils are equal, round, and reactive to light.   Neck:      Vascular: No carotid bruit.   Cardiovascular:      Rate and Rhythm: Normal rate and regular rhythm.      Pulses: Normal pulses.      Heart sounds: Normal heart sounds. No murmur heard.     Pulmonary:      Effort: Pulmonary effort is normal. No respiratory distress.      Breath sounds: No wheezing, rhonchi or rales.   Abdominal:      General: Abdomen is flat. Bowel sounds are normal.      Palpations: Abdomen is soft. There is no mass.      Tenderness: There is no abdominal tenderness. There is no guarding.   Musculoskeletal:         General: No swelling or tenderness.      Cervical back: Normal range of motion. No rigidity. No muscular tenderness.      Right lower leg: No edema.      Left lower leg: No edema.   Lymphadenopathy:      Cervical: No cervical adenopathy.   Skin:     General: Skin is warm and dry.      Capillary Refill: Capillary refill takes less than 2 seconds.      Coloration: Skin is not jaundiced.      Findings: No erythema.   Neurological:      General: No focal deficit present.      Mental Status: He is alert and " oriented to person, place, and time. Mental status is at baseline.   Psychiatric:         Mood and Affect: Mood normal.         Behavior: Behavior normal.         Thought Content: Thought content normal.         Judgment: Judgment normal.       Labs: None    Imaging: CT scan of the chest reviewed as noted in the present illness.  Comparison with previous PET scan as noted in the present illness.    Assessment: Pulmonary nodule in the lingula unchanged over the past 18 months    Plan: Return to clinic in 8 months (this will make 2 years) with a CT scan for final follow-up (assuming there is no change).        Milan Man MD  CTSurgery  04/08/21   10:36 EDT

## 2021-10-11 DIAGNOSIS — R91.1 LUNG NODULE: Primary | ICD-10-CM

## 2021-11-17 ENCOUNTER — HOSPITAL ENCOUNTER (OUTPATIENT)
Dept: CT IMAGING | Facility: HOSPITAL | Age: 51
Discharge: HOME | End: 2021-11-17
Admitting: NURSE PRACTITIONER

## 2021-11-17 ENCOUNTER — TELEPHONE (OUTPATIENT)
Dept: CARDIAC SURGERY | Facility: CLINIC | Age: 51
End: 2021-11-17

## 2021-11-17 DIAGNOSIS — R91.1 LUNG NODULE: ICD-10-CM

## 2021-11-17 PROCEDURE — 71250 CT THORAX DX C-: CPT

## 2021-11-17 NOTE — TELEPHONE ENCOUNTER
Jyoti from [facility] CT called stating this pt was there to have his CT done and she requested we order the CT without contrast instead of with and without due to the patient only having one kidney - all of his previous scans have been ordered with and without and ultimately done without, so I gave a verbal that the CT without contrast would be fine. We will try to order only without contrast scans in the future.

## 2021-12-09 ENCOUNTER — OFFICE VISIT (OUTPATIENT)
Dept: CARDIAC SURGERY | Facility: CLINIC | Age: 51
End: 2021-12-09

## 2021-12-09 VITALS
HEART RATE: 60 BPM | BODY MASS INDEX: 33.65 KG/M2 | TEMPERATURE: 97.8 F | WEIGHT: 209.4 LBS | OXYGEN SATURATION: 98 % | DIASTOLIC BLOOD PRESSURE: 95 MMHG | SYSTOLIC BLOOD PRESSURE: 147 MMHG | HEIGHT: 66 IN

## 2021-12-09 DIAGNOSIS — R91.1 LUNG NODULE: Primary | ICD-10-CM

## 2021-12-09 PROCEDURE — 99214 OFFICE O/P EST MOD 30 MIN: CPT | Performed by: NURSE PRACTITIONER

## 2021-12-09 NOTE — PROGRESS NOTES
[facility] Cardiothoracic Surgery Office Follow Up Note     Date of Encounter: 2021     Name: Geovanny Barnett  : 1970     Referred By: No ref. provider found  PCP: Champ Feng MD    Chief Complaint:    Chief Complaint   Patient presents with   • Lung Nodule     6 month follow-up with results from CT chest        Subjective      History of Present Illness:    Geovanny Barnett is a 51 y.o. male former smoker, with a history of HLD on statin therapy, MI, CAD s/p stent, and left lung nodule. This nodule was first identified 1-1/2 years ago on a routine chest x-ray.  Subsequent CT scan and PET scan were obtained showing no significant metabolic activity.  The CT scan showed a 9 mm irregular lesion in the lingula without significant calcification that was unchanged over 18 month period. Patient presents today for follow up with repeat chest CT. Patient has been doing well, denies any SOA, hemoptysis, unintentional weight loss, fevers, or lymphadenopathy. He does not smoke but reports he has frequent second hand smoke exposure.     Review of Systems:  Review of Systems   Constitutional: Negative for chills, decreased appetite, diaphoresis, fever, malaise/fatigue, night sweats and weight loss.   HENT: Negative for congestion, hoarse voice, sore throat and stridor.    Cardiovascular: Negative for chest pain, claudication, dyspnea on exertion, irregular heartbeat, leg swelling, near-syncope, orthopnea, palpitations, paroxysmal nocturnal dyspnea and syncope.   Respiratory: Negative for cough, hemoptysis, shortness of breath, sleep disturbances due to breathing, snoring, sputum production and wheezing.    Hematologic/Lymphatic: Negative for adenopathy and bleeding problem. Does not bruise/bleed easily.   Skin: Negative for color change, dry skin, itching, poor wound healing and rash.   Musculoskeletal: Negative for arthritis, back pain, falls and muscle weakness.   Gastrointestinal: Negative for  abdominal pain, anorexia, constipation, diarrhea, hematochezia, melena, nausea and vomiting.   Neurological: Negative for difficulty with concentration, disturbances in coordination, dizziness, loss of balance, numbness, seizures, vertigo and weakness.   Psychiatric/Behavioral: Negative for altered mental status, depression, memory loss and substance abuse. The patient does not have insomnia and is not nervous/anxious.    Allergic/Immunologic: Negative for persistent infections.       I have reviewed the following portions of the patient's history: allergies, current medications, past family history, past medical history, past social history, past surgical history, problem list and ROS and confirm it's accurate.    Allergies:  No Known Allergies    Medications:      Current Outpatient Medications:   •  ALLOPURINOL PO, Take  by mouth., Disp: , Rfl:   •  aspirin 81 MG EC tablet, Take 81 mg by mouth Daily., Disp: , Rfl:   •  omeprazole (priLOSEC) 20 MG capsule, Take 20 mg by mouth Daily., Disp: , Rfl:   •  pantoprazole (PROTONIX) 20 MG EC tablet, pantoprazole, Disp: , Rfl:   •  raNITIdine (ZANTAC) 150 MG tablet, Take 1 tablet by mouth 2 (Two) Times a Day., Disp: 30 tablet, Rfl: 0  •  rosuvastatin (Crestor) 40 MG tablet, Take 40 mg by mouth Daily., Disp: , Rfl:   •  sucralfate (CARAFATE) 1 g tablet, Take 1 tablet by mouth 4 (Four) Times a Day Before Meals & at Bedtime., Disp: 60 tablet, Rfl: 0    History:   Past Medical History:   Diagnosis Date   • GERD (gastroesophageal reflux disease)    • Gout    • Hyperlipidemia    • Myocardial infarction (HCC)        Past Surgical History:   Procedure Laterality Date   • CORONARY ANGIOPLASTY WITH STENT PLACEMENT     • HIP SURGERY Bilateral        Social History     Socioeconomic History   • Marital status: Single   • Number of children: 0   Tobacco Use   • Smoking status: Former Smoker     Packs/day: 0.25     Years: 19.00     Pack years: 4.75     Quit date: 2016     Years since  "quittin.9   • Smokeless tobacco: Never Used   Vaping Use   • Vaping Use: Never used   Substance and Sexual Activity   • Alcohol use: Yes     Alcohol/week: 12.0 standard drinks     Types: 12 Cans of beer per week   • Drug use: No        Family History   Problem Relation Age of Onset   • Hypertension Mother    • Diabetes Mother    • Cancer Mother    • Cancer Father    • Heart valve disorder Father        Objective     Physical Exam:  Vitals:    21 1059   BP: 147/95   BP Location: Left arm   Patient Position: Sitting   Pulse: 60   Temp: 97.8 °F (36.6 °C)   SpO2: 98%   Weight: 95 kg (209 lb 6.4 oz)   Height: 167.6 cm (66\")      Body mass index is 33.8 kg/m².    Physical Exam  Vitals and nursing note reviewed.   Constitutional:       Appearance: Normal appearance. He is well-developed.   HENT:      Head: Normocephalic and atraumatic.   Eyes:      Pupils: Pupils are equal, round, and reactive to light.   Neck:      Vascular: No carotid bruit.   Cardiovascular:      Rate and Rhythm: Normal rate and regular rhythm.      Pulses: Normal pulses.      Heart sounds: Normal heart sounds, S1 normal and S2 normal. No murmur heard.      Pulmonary:      Effort: Pulmonary effort is normal.      Breath sounds: Normal breath sounds.   Abdominal:      Palpations: Abdomen is soft.   Musculoskeletal:         General: No swelling.      Cervical back: Neck supple.      Right lower leg: No edema.      Left lower leg: No edema.   Skin:     General: Skin is warm and dry.      Capillary Refill: Capillary refill takes less than 2 seconds.      Findings: No bruising.   Neurological:      General: No focal deficit present.      Mental Status: He is alert and oriented to person, place, and time. Mental status is at baseline.      GCS: GCS eye subscore is 4. GCS verbal subscore is 5. GCS motor subscore is 6.      Motor: Motor function is intact.      Coordination: Coordination is intact.      Gait: Gait is intact.   Psychiatric:         " Mood and Affect: Mood normal.         Speech: Speech normal.         Behavior: Behavior normal. Behavior is cooperative.         Cognition and Memory: Cognition normal.         Imaging/Labs:    CT Chest Without Contrast Diagnostic    Result Date: 11/19/2021  Personally reviewed: stable 9 mm left upper lobe lung nodule.   A calcified granuloma identified measuring 9 mm in the left upper lobe. Remainder of the chest is stable.  DICTATED:   11/17/2021 EDITED/lfs:   11/17/2021   This report was finalized on 11/19/2021 4:15 PM by Dr. Cielo Linder MD.             Assessment / Plan      Assessment / Plan:  Diagnoses and all orders for this visit:    1. Lung nodule (Primary)       1. Lung nodules: Patient denies any acute complaints today or new consitutional symptoms. Reviewed patient's CT scan results with him, showing stable size of 9 mm left upper lobe nodule that is unchanged in appearance without any evidence of new nodules. At the time of Dr Man last visit with patient, he discussed that if nodule was stable, patient could continue surveillance with his pcp. Patient follows with PCP at [facility]. Discussed continuing surveillance of lung nodule with low dose CT Chest scans according to Fleischner guidelines with his PCP. Patient instructed to contact our office with an changes or concerns. Will plan to see the patient back on an as needed basis.       Follow Up:   Return if symptoms worsen or fail to improve.   Or sooner for any further concerns or worsening sign and symptoms. If unable to reach us in the office please dial 911 or go to the nearest emergency department.      Cassidy BRYAN  [facility] Cardiothoracic Surgery    Time Spent: I spent 31 minutes caring for Geovanny on this date of service. This time includes time spent by me in the following activities: preparing for the visit, reviewing tests, obtaining and/or reviewing a separately obtained history, performing a medically  appropriate examination and/or evaluation, counseling and educating the patient/family/caregiver, documenting information in the medical record and independently interpreting results and communicating that information with the patient/family/caregiver.

## 2021-12-20 ENCOUNTER — APPOINTMENT (OUTPATIENT)
Dept: GENERAL RADIOLOGY | Facility: HOSPITAL | Age: 51
End: 2021-12-20

## 2021-12-20 ENCOUNTER — HOSPITAL ENCOUNTER (EMERGENCY)
Facility: HOSPITAL | Age: 51
Discharge: HOME | End: 2021-12-20
Attending: STUDENT IN AN ORGANIZED HEALTH CARE EDUCATION/TRAINING PROGRAM | Admitting: STUDENT IN AN ORGANIZED HEALTH CARE EDUCATION/TRAINING PROGRAM

## 2021-12-20 VITALS
HEART RATE: 68 BPM | WEIGHT: 200 LBS | OXYGEN SATURATION: 94 % | DIASTOLIC BLOOD PRESSURE: 87 MMHG | RESPIRATION RATE: 18 BRPM | TEMPERATURE: 98 F | BODY MASS INDEX: 32.14 KG/M2 | HEIGHT: 66 IN | SYSTOLIC BLOOD PRESSURE: 123 MMHG

## 2021-12-20 DIAGNOSIS — R79.89 ELEVATED SERUM CREATININE: ICD-10-CM

## 2021-12-20 DIAGNOSIS — R07.9 CHEST PAIN, UNSPECIFIED TYPE: Primary | ICD-10-CM

## 2021-12-20 DIAGNOSIS — K21.9 GASTROESOPHAGEAL REFLUX DISEASE, UNSPECIFIED WHETHER ESOPHAGITIS PRESENT: ICD-10-CM

## 2021-12-20 LAB
ALBUMIN SERPL-MCNC: 4.9 G/DL (ref 3.5–5.2)
ALBUMIN/GLOB SERPL: 1.8 G/DL
ALP SERPL-CCNC: 82 U/L (ref 39–117)
ALT SERPL W P-5'-P-CCNC: 61 U/L (ref 1–41)
ANION GAP SERPL CALCULATED.3IONS-SCNC: 13 MMOL/L (ref 5–15)
AST SERPL-CCNC: 48 U/L (ref 1–40)
BASOPHILS # BLD AUTO: 0.04 10*3/MM3 (ref 0–0.2)
BASOPHILS NFR BLD AUTO: 0.5 % (ref 0–1.5)
BILIRUB SERPL-MCNC: 0.5 MG/DL (ref 0–1.2)
BUN SERPL-MCNC: 22 MG/DL (ref 6–20)
BUN/CREAT SERPL: 15.2 (ref 7–25)
CALCIUM SPEC-SCNC: 9.5 MG/DL (ref 8.6–10.5)
CHLORIDE SERPL-SCNC: 102 MMOL/L (ref 98–107)
CO2 SERPL-SCNC: 24 MMOL/L (ref 22–29)
CREAT SERPL-MCNC: 1.45 MG/DL (ref 0.76–1.27)
DEPRECATED RDW RBC AUTO: 44.2 FL (ref 37–54)
EOSINOPHIL # BLD AUTO: 0.16 10*3/MM3 (ref 0–0.4)
EOSINOPHIL NFR BLD AUTO: 1.8 % (ref 0.3–6.2)
ERYTHROCYTE [DISTWIDTH] IN BLOOD BY AUTOMATED COUNT: 13.2 % (ref 12.3–15.4)
GFR SERPL CREATININE-BSD FRML MDRD: 51 ML/MIN/1.73
GLOBULIN UR ELPH-MCNC: 2.8 GM/DL
GLUCOSE SERPL-MCNC: 130 MG/DL (ref 65–99)
HCT VFR BLD AUTO: 46.3 % (ref 37.5–51)
HGB BLD-MCNC: 15.8 G/DL (ref 13–17.7)
HOLD SPECIMEN: NORMAL
IMM GRANULOCYTES # BLD AUTO: 0.04 10*3/MM3 (ref 0–0.05)
IMM GRANULOCYTES NFR BLD AUTO: 0.5 % (ref 0–0.5)
LIPASE SERPL-CCNC: 46 U/L (ref 13–60)
LYMPHOCYTES # BLD AUTO: 3.65 10*3/MM3 (ref 0.7–3.1)
LYMPHOCYTES NFR BLD AUTO: 41.7 % (ref 19.6–45.3)
MCH RBC QN AUTO: 31.6 PG (ref 26.6–33)
MCHC RBC AUTO-ENTMCNC: 34.1 G/DL (ref 31.5–35.7)
MCV RBC AUTO: 92.6 FL (ref 79–97)
MONOCYTES # BLD AUTO: 0.55 10*3/MM3 (ref 0.1–0.9)
MONOCYTES NFR BLD AUTO: 6.3 % (ref 5–12)
NEUTROPHILS NFR BLD AUTO: 4.31 10*3/MM3 (ref 1.7–7)
NEUTROPHILS NFR BLD AUTO: 49.2 % (ref 42.7–76)
NRBC BLD AUTO-RTO: 0 /100 WBC (ref 0–0.2)
NT-PROBNP SERPL-MCNC: 12.1 PG/ML (ref 0–900)
PLATELET # BLD AUTO: 214 10*3/MM3 (ref 140–450)
PMV BLD AUTO: 10.8 FL (ref 6–12)
POTASSIUM SERPL-SCNC: 4.1 MMOL/L (ref 3.5–5.2)
PROT SERPL-MCNC: 7.7 G/DL (ref 6–8.5)
RBC # BLD AUTO: 5 10*6/MM3 (ref 4.14–5.8)
SODIUM SERPL-SCNC: 139 MMOL/L (ref 136–145)
TROPONIN T SERPL-MCNC: <0.01 NG/ML (ref 0–0.03)
TROPONIN T SERPL-MCNC: <0.01 NG/ML (ref 0–0.03)
WBC NRBC COR # BLD: 8.75 10*3/MM3 (ref 3.4–10.8)
WHOLE BLOOD HOLD SPECIMEN: NORMAL
WHOLE BLOOD HOLD SPECIMEN: NORMAL

## 2021-12-20 PROCEDURE — 83690 ASSAY OF LIPASE: CPT | Performed by: STUDENT IN AN ORGANIZED HEALTH CARE EDUCATION/TRAINING PROGRAM

## 2021-12-20 PROCEDURE — 25010000002 ONDANSETRON PER 1 MG: Performed by: STUDENT IN AN ORGANIZED HEALTH CARE EDUCATION/TRAINING PROGRAM

## 2021-12-20 PROCEDURE — 71045 X-RAY EXAM CHEST 1 VIEW: CPT

## 2021-12-20 PROCEDURE — 96374 THER/PROPH/DIAG INJ IV PUSH: CPT

## 2021-12-20 PROCEDURE — 96361 HYDRATE IV INFUSION ADD-ON: CPT

## 2021-12-20 PROCEDURE — 84484 ASSAY OF TROPONIN QUANT: CPT | Performed by: STUDENT IN AN ORGANIZED HEALTH CARE EDUCATION/TRAINING PROGRAM

## 2021-12-20 PROCEDURE — 80053 COMPREHEN METABOLIC PANEL: CPT | Performed by: STUDENT IN AN ORGANIZED HEALTH CARE EDUCATION/TRAINING PROGRAM

## 2021-12-20 PROCEDURE — 99283 EMERGENCY DEPT VISIT LOW MDM: CPT

## 2021-12-20 PROCEDURE — 93005 ELECTROCARDIOGRAM TRACING: CPT | Performed by: STUDENT IN AN ORGANIZED HEALTH CARE EDUCATION/TRAINING PROGRAM

## 2021-12-20 PROCEDURE — 83880 ASSAY OF NATRIURETIC PEPTIDE: CPT | Performed by: STUDENT IN AN ORGANIZED HEALTH CARE EDUCATION/TRAINING PROGRAM

## 2021-12-20 PROCEDURE — 93005 ELECTROCARDIOGRAM TRACING: CPT

## 2021-12-20 PROCEDURE — 36415 COLL VENOUS BLD VENIPUNCTURE: CPT

## 2021-12-20 PROCEDURE — 85025 COMPLETE CBC W/AUTO DIFF WBC: CPT

## 2021-12-20 RX ORDER — ASPIRIN 81 MG/1
324 TABLET, CHEWABLE ORAL ONCE
Status: COMPLETED | OUTPATIENT
Start: 2021-12-20 | End: 2021-12-20

## 2021-12-20 RX ORDER — FAMOTIDINE 20 MG/1
20 TABLET, FILM COATED ORAL 2 TIMES DAILY
Qty: 30 TABLET | Refills: 0 | Status: SHIPPED | OUTPATIENT
Start: 2021-12-20

## 2021-12-20 RX ORDER — SODIUM CHLORIDE 0.9 % (FLUSH) 0.9 %
10 SYRINGE (ML) INJECTION AS NEEDED
Status: DISCONTINUED | OUTPATIENT
Start: 2021-12-20 | End: 2021-12-20 | Stop reason: HOSPADM

## 2021-12-20 RX ORDER — ONDANSETRON 2 MG/ML
4 INJECTION INTRAMUSCULAR; INTRAVENOUS ONCE
Status: COMPLETED | OUTPATIENT
Start: 2021-12-20 | End: 2021-12-20

## 2021-12-20 RX ADMIN — ASPIRIN 324 MG: 81 TABLET, CHEWABLE ORAL at 02:17

## 2021-12-20 RX ADMIN — SODIUM CHLORIDE, POTASSIUM CHLORIDE, SODIUM LACTATE AND CALCIUM CHLORIDE 1000 ML: 600; 310; 30; 20 INJECTION, SOLUTION INTRAVENOUS at 02:53

## 2021-12-20 RX ADMIN — LIDOCAINE HYDROCHLORIDE: 20 SOLUTION OROPHARYNGEAL at 02:56

## 2021-12-20 RX ADMIN — ONDANSETRON 4 MG: 2 INJECTION INTRAMUSCULAR; INTRAVENOUS at 02:54

## 2021-12-20 NOTE — DISCHARGE INSTRUCTIONS
Take the provided antacid medication and see if it helps with symptoms.  As we discussed you should contact Dr. Briggs to discuss the mildly elevated creatinine at 1.45 today.  Please return to the ED or seek other medical care for any concerning symptoms.

## 2021-12-21 LAB
QT INTERVAL: 386 MS
QT INTERVAL: 402 MS
QTC INTERVAL: 404 MS
QTC INTERVAL: 424 MS

## 2021-12-21 NOTE — ED PROVIDER NOTES
EMERGENCY DEPARTMENT ENCOUNTER    Pt Name: Geovanny Barnett  MRN: [id number]  Pt :   1970  Room Number:    Date of encounter:  2021  PCP: Champ Feng MD  ED Provider: Zion Raza MD    Historian: Patient      HPI:  Chief Complaint: Chest pain        Context: Geovanny Barnett is a 51-year-old male with history of prior MI and GERD who presents because of chest tightness that started after dinner this evening.  He says years ago he had a heart attack and since then has been evaluated several times and was found to be just GERD which she currently manages with 20 mg of daily omeprazole.  He is describing moderate, substernal chest tightness without radiation.  He has not noticed any positional or exertional component to it.  Pain does wax and wane somewhat with time but he knows of nothing that makes it better.  He says this is different from his previous cardiac pain.  Denies recent fevers or illnesses.  No other complaints at this time.      PAST MEDICAL HISTORY  Past Medical History:   Diagnosis Date   • GERD (gastroesophageal reflux disease)    • Gout    • Hyperlipidemia    • Myocardial infarction (HCC)          PAST SURGICAL HISTORY  Past Surgical History:   Procedure Laterality Date   • CORONARY ANGIOPLASTY WITH STENT PLACEMENT     • HIP SURGERY Bilateral          FAMILY HISTORY  Family History   Problem Relation Age of Onset   • Hypertension Mother    • Diabetes Mother    • Cancer Mother    • Cancer Father    • Heart valve disorder Father          SOCIAL HISTORY  Social History     Socioeconomic History   • Marital status: Single   • Number of children: 0   Tobacco Use   • Smoking status: Former Smoker     Packs/day: 0.25     Years: 19.00     Pack years: 4.75     Quit date:      Years since quittin.9   • Smokeless tobacco: Never Used   Vaping Use   • Vaping Use: Never used   Substance and Sexual Activity   • Alcohol use: Yes     Alcohol/week: 12.0 standard drinks      Types: 12 Cans of beer per week   • Drug use: No         ALLERGIES  Patient has no known allergies.        REVIEW OF SYSTEMS  Review of Systems       All systems reviewed and negative except for those discussed in HPI.       PHYSICAL EXAM    I have reviewed the triage vital signs and nursing notes.    ED Triage Vitals [12/20/21 0123]   Temp Heart Rate Resp BP SpO2   98 °F (36.7 °C) 70 18 149/97 94 %      Temp src Heart Rate Source Patient Position BP Location FiO2 (%)   Oral Monitor Sitting Left arm --       Physical Exam  GENERAL:   Appears awake and alert in discomfort but no acute distress  HENT: Nares patent.  EYES: No scleral icterus.  CV: Regular rhythm, regular rate.  No murmurs rubs or gallops.  RESPIRATORY: Normal effort.  No audible wheezes, rales or rhonchi.  ABDOMEN: Soft, nontender  MUSCULOSKELETAL: No deformities.   NEURO: Alert, moves all extremities, follows commands.  SKIN: Warm, dry, no rash visualized.        LAB RESULTS  Recent Results (from the past 24 hour(s))   Troponin    Collection Time: 12/20/21  1:32 AM    Specimen: Blood   Result Value Ref Range    Troponin T <0.010 0.000 - 0.030 ng/mL   Comprehensive Metabolic Panel    Collection Time: 12/20/21  1:32 AM    Specimen: Blood   Result Value Ref Range    Glucose 130 (H) 65 - 99 mg/dL    BUN 22 (H) 6 - 20 mg/dL    Creatinine 1.45 (H) 0.76 - 1.27 mg/dL    Sodium 139 136 - 145 mmol/L    Potassium 4.1 3.5 - 5.2 mmol/L    Chloride 102 98 - 107 mmol/L    CO2 24.0 22.0 - 29.0 mmol/L    Calcium 9.5 8.6 - 10.5 mg/dL    Total Protein 7.7 6.0 - 8.5 g/dL    Albumin 4.90 3.50 - 5.20 g/dL    ALT (SGPT) 61 (H) 1 - 41 U/L    AST (SGOT) 48 (H) 1 - 40 U/L    Alkaline Phosphatase 82 39 - 117 U/L    Total Bilirubin 0.5 0.0 - 1.2 mg/dL    eGFR Non African Amer 51 (L) >60 mL/min/1.73    Globulin 2.8 gm/dL    A/G Ratio 1.8 g/dL    BUN/Creatinine Ratio 15.2 7.0 - 25.0    Anion Gap 13.0 5.0 - 15.0 mmol/L   Lipase    Collection Time: 12/20/21  1:32 AM    Specimen:  Blood   Result Value Ref Range    Lipase 46 13 - 60 U/L   BNP    Collection Time: 12/20/21  1:32 AM    Specimen: Blood   Result Value Ref Range    proBNP 12.1 0.0 - 900.0 pg/mL   Green Top (Gel)    Collection Time: 12/20/21  1:32 AM   Result Value Ref Range    Extra Tube Hold for add-ons.    Lavender Top    Collection Time: 12/20/21  1:32 AM   Result Value Ref Range    Extra Tube hold for add-on    Gold Top - SST    Collection Time: 12/20/21  1:32 AM   Result Value Ref Range    Extra Tube Hold for add-ons.    Gray Top    Collection Time: 12/20/21  1:32 AM   Result Value Ref Range    Extra Tube Hold for add-ons.    Light Blue Top    Collection Time: 12/20/21  1:32 AM   Result Value Ref Range    Extra Tube hold for add-on    CBC Auto Differential    Collection Time: 12/20/21  1:32 AM    Specimen: Blood   Result Value Ref Range    WBC 8.75 3.40 - 10.80 10*3/mm3    RBC 5.00 4.14 - 5.80 10*6/mm3    Hemoglobin 15.8 13.0 - 17.7 g/dL    Hematocrit 46.3 37.5 - 51.0 %    MCV 92.6 79.0 - 97.0 fL    MCH 31.6 26.6 - 33.0 pg    MCHC 34.1 31.5 - 35.7 g/dL    RDW 13.2 12.3 - 15.4 %    RDW-SD 44.2 37.0 - 54.0 fl    MPV 10.8 6.0 - 12.0 fL    Platelets 214 140 - 450 10*3/mm3    Neutrophil % 49.2 42.7 - 76.0 %    Lymphocyte % 41.7 19.6 - 45.3 %    Monocyte % 6.3 5.0 - 12.0 %    Eosinophil % 1.8 0.3 - 6.2 %    Basophil % 0.5 0.0 - 1.5 %    Immature Grans % 0.5 0.0 - 0.5 %    Neutrophils, Absolute 4.31 1.70 - 7.00 10*3/mm3    Lymphocytes, Absolute 3.65 (H) 0.70 - 3.10 10*3/mm3    Monocytes, Absolute 0.55 0.10 - 0.90 10*3/mm3    Eosinophils, Absolute 0.16 0.00 - 0.40 10*3/mm3    Basophils, Absolute 0.04 0.00 - 0.20 10*3/mm3    Immature Grans, Absolute 0.04 0.00 - 0.05 10*3/mm3    nRBC 0.0 0.0 - 0.2 /100 WBC   Troponin    Collection Time: 12/20/21  3:35 AM    Specimen: Blood   Result Value Ref Range    Troponin T <0.010 0.000 - 0.030 ng/mL       If labs were ordered, I independently reviewed the results.        RADIOLOGY  XR Chest 1  View    Result Date: 12/20/2021  CR Chest 1 Vw INDICATION: Chest pain today COMPARISON:  1/11/2018 FINDINGS: Portable AP view(s) of the chest.  The heart and mediastinal contours are normal. The lungs are clear. No pneumothorax or pleural effusion.     No acute cardiopulmonary findings. Signer Name: Bobby Billingsley MD  Signed: 12/20/2021 2:39 AM  Workstation Name: [facility]  [facility]      I ordered and reviewed the above noted radiographic studies.      I viewed images of chest x-ray which reveals no significant cardiomegaly, clear lungs, no abnormalities that I can appreciate.  See radiologist's dictation for official interpretation.        PROCEDURES    Procedures    ECG 12 Lead         ECG 12 Lead             MEDICATIONS GIVEN IN ER    Medications   aspirin chewable tablet 324 mg (324 mg Oral Given 12/20/21 0217)   lactated ringers bolus 1,000 mL (0 mL Intravenous Stopped 12/20/21 0353)   aluminum-magnesium hydroxide-simethicone (MAALOX MAX) 400-400-40 MG/5ML 30 mL, Lidocaine Viscous HCl (XYLOCAINE) 2 % 15 mL suspension ( Oral Given 12/20/21 0256)   ondansetron (ZOFRAN) injection 4 mg (4 mg Intravenous Given 12/20/21 0254)         PROGRESS, DATA ANALYSIS, CONSULTS, AND MEDICAL DECISION MAKING    All labs have been independently reviewed by me.  All radiology studies have been reviewed by me and the radiologist dictating the report.   EKG's have been independently viewed and interpreted by me.      Differential diagnoses: MI, angina, pneumonia, pneumothorax, CHF, pulmonary embolism, GERD, aortic dissection           In summary is a very nice 51-year-old male with history of prior MI and GERD who presents because of chest tightness that started after dinner this evening.  He says years ago he had a heart attack and since then has been evaluated several times and was found to be just GERD which she currently manages with 20 mg of daily omeprazole.  He is describing moderate, substernal chest  tightness without radiation.  He has not noticed any positional or exertional component to it.  Pain does wax and wane somewhat with time but he knows of nothing that makes it better.  He says this is different from his previous cardiac pain.  Denies recent fevers or illnesses.  No other complaints at this time.    He arrived well-appearing, hemodynamically stable vitals within normal limits.  Treated immediately with 324 aspirin.  Stat EKG generally reassuring with possible T wave flattening but no acute ST elevations or depressions, regular rate and rhythm, normal axis.  Chest x-ray is clear.  Mild hyperglycemia and elevated creatinine at 1.45 not meeting criteria for acute kidney injury when compared with prior.  0 and 2-hour high-sensitivity troponins are both not elevated.  BNP is also not elevated.  Heart score is 3.  He was treated symptomatically with GI cocktail, Zofran, IV lactated Ringer's bolus.  Upon reevaluation he says his pain is resolved and he felt immediate relief with a GI cocktail.  I believe this is more likely pain from GERD than cardiac in nature.  He is currently only taking 20 mg of daily omeprazole and will add daily famotidine to this to see if it helps with his symptoms.  We discussed his elevated creatinine which is particularly concerning because he has a solitary kidney congenitally.  He follows with Dr. Briggs here in town for his kidneys and he is agreeable to contacting Dr. Rodriguez first thing in the morning and letting him know that his creatinine here today was 1.45 to see if this is elevated significantly from his most recent values.  He was counseled on return precautions verbally expressed understanding of these.      AS OF 22:38 EST VITALS:    BP - 123/87  HR - 68  TEMP - 98 °F (36.7 °C) (Oral)  O2 SATS - 94%                  DIAGNOSIS  Final diagnoses:   Chest pain, unspecified type   Gastroesophageal reflux disease, unspecified whether esophagitis present   Elevated serum  creatinine         DISPOSITION  DISCHARGE    Patient discharged in stable condition.    Reviewed implications of results, diagnosis, meds, responsibility to follow up, warning signs and symptoms of possible worsening, potential complications and reasons to return to ER.    Patient/Family voiced understanding of above instructions.    Discussed plan for discharge, as there is no emergent indication for admission.  Pt/family is agreeable and understands need for follow up and possible repeat testing.  Pt/family is aware that discharge does not mean that nothing is wrong but that it indicates no emergency is currently present that requires admission and they must continue care with follow-up as given below or with a physician of their choice.     FOLLOW-UP  Champ Feng MD  [address] [facility] [address]  [place] [address]  [phone]    Call       Charles Briggs MD  [address] [facility] [address]  [place] [address]  [phone]    Call   To discuss creatinine of 1.45 on labs today.         Medication List      New Prescriptions    famotidine 20 MG tablet  Commonly known as: PEPCID  Take 1 tablet by mouth 2 (Two) Times a Day.        Stop    raNITIdine 150 MG tablet  Commonly known as: ZANTAC           Where to Get Your Medications      These medications were sent to [address] [place] [address] AT [address] [phone] [place] [id number] [place] [address]    Phone: [phone]   · famotidine 20 MG tablet                    Zion Raza MD  12/20/21 3170

## 2021-12-22 ENCOUNTER — HOSPITAL ENCOUNTER (EMERGENCY)
Facility: HOSPITAL | Age: 51
Discharge: HOME | End: 2021-12-22
Attending: EMERGENCY MEDICINE | Admitting: EMERGENCY MEDICINE

## 2021-12-22 ENCOUNTER — APPOINTMENT (OUTPATIENT)
Dept: CT IMAGING | Facility: HOSPITAL | Age: 51
End: 2021-12-22

## 2021-12-22 VITALS
OXYGEN SATURATION: 94 % | WEIGHT: 200 LBS | SYSTOLIC BLOOD PRESSURE: 127 MMHG | HEIGHT: 66 IN | TEMPERATURE: 98.1 F | HEART RATE: 67 BPM | BODY MASS INDEX: 32.14 KG/M2 | DIASTOLIC BLOOD PRESSURE: 93 MMHG | RESPIRATION RATE: 18 BRPM

## 2021-12-22 DIAGNOSIS — R10.9 ACUTE LEFT FLANK PAIN: Primary | ICD-10-CM

## 2021-12-22 LAB
ALBUMIN SERPL-MCNC: 5.1 G/DL (ref 3.5–5.2)
ALBUMIN/GLOB SERPL: 1.6 G/DL
ALP SERPL-CCNC: 80 U/L (ref 39–117)
ALT SERPL W P-5'-P-CCNC: 74 U/L (ref 1–41)
ANION GAP SERPL CALCULATED.3IONS-SCNC: 16 MMOL/L (ref 5–15)
AST SERPL-CCNC: 69 U/L (ref 1–40)
BACTERIA UR QL AUTO: NORMAL /HPF
BASOPHILS # BLD AUTO: 0.05 10*3/MM3 (ref 0–0.2)
BASOPHILS NFR BLD AUTO: 0.6 % (ref 0–1.5)
BILIRUB SERPL-MCNC: 0.8 MG/DL (ref 0–1.2)
BILIRUB UR QL STRIP: NEGATIVE
BUN SERPL-MCNC: 17 MG/DL (ref 6–20)
BUN/CREAT SERPL: 15.6 (ref 7–25)
CALCIUM SPEC-SCNC: 10.1 MG/DL (ref 8.6–10.5)
CHLORIDE SERPL-SCNC: 101 MMOL/L (ref 98–107)
CLARITY UR: CLEAR
CO2 SERPL-SCNC: 23 MMOL/L (ref 22–29)
COLOR UR: YELLOW
CREAT SERPL-MCNC: 1.09 MG/DL (ref 0.76–1.27)
DEPRECATED RDW RBC AUTO: 43.7 FL (ref 37–54)
EOSINOPHIL # BLD AUTO: 0.12 10*3/MM3 (ref 0–0.4)
EOSINOPHIL NFR BLD AUTO: 1.3 % (ref 0.3–6.2)
ERYTHROCYTE [DISTWIDTH] IN BLOOD BY AUTOMATED COUNT: 13 % (ref 12.3–15.4)
GFR SERPL CREATININE-BSD FRML MDRD: 71 ML/MIN/1.73
GLOBULIN UR ELPH-MCNC: 3.2 GM/DL
GLUCOSE SERPL-MCNC: 120 MG/DL (ref 65–99)
GLUCOSE UR STRIP-MCNC: NEGATIVE MG/DL
HCT VFR BLD AUTO: 46 % (ref 37.5–51)
HGB BLD-MCNC: 15.4 G/DL (ref 13–17.7)
HGB UR QL STRIP.AUTO: ABNORMAL
HYALINE CASTS UR QL AUTO: NORMAL /LPF
IMM GRANULOCYTES # BLD AUTO: 0.03 10*3/MM3 (ref 0–0.05)
IMM GRANULOCYTES NFR BLD AUTO: 0.3 % (ref 0–0.5)
KETONES UR QL STRIP: ABNORMAL
LEUKOCYTE ESTERASE UR QL STRIP.AUTO: NEGATIVE
LIPASE SERPL-CCNC: 31 U/L (ref 13–60)
LYMPHOCYTES # BLD AUTO: 3.17 10*3/MM3 (ref 0.7–3.1)
LYMPHOCYTES NFR BLD AUTO: 35.2 % (ref 19.6–45.3)
MCH RBC QN AUTO: 31.1 PG (ref 26.6–33)
MCHC RBC AUTO-ENTMCNC: 33.5 G/DL (ref 31.5–35.7)
MCV RBC AUTO: 92.9 FL (ref 79–97)
MONOCYTES # BLD AUTO: 0.59 10*3/MM3 (ref 0.1–0.9)
MONOCYTES NFR BLD AUTO: 6.6 % (ref 5–12)
NEUTROPHILS NFR BLD AUTO: 5.04 10*3/MM3 (ref 1.7–7)
NEUTROPHILS NFR BLD AUTO: 56 % (ref 42.7–76)
NITRITE UR QL STRIP: NEGATIVE
NRBC BLD AUTO-RTO: 0 /100 WBC (ref 0–0.2)
PH UR STRIP.AUTO: <=5 [PH] (ref 5–8)
PLATELET # BLD AUTO: 228 10*3/MM3 (ref 140–450)
PMV BLD AUTO: 10.4 FL (ref 6–12)
POTASSIUM SERPL-SCNC: 4.1 MMOL/L (ref 3.5–5.2)
PROT SERPL-MCNC: 8.3 G/DL (ref 6–8.5)
PROT UR QL STRIP: ABNORMAL
RBC # BLD AUTO: 4.95 10*6/MM3 (ref 4.14–5.8)
RBC # UR STRIP: NORMAL /HPF
REF LAB TEST METHOD: NORMAL
SODIUM SERPL-SCNC: 140 MMOL/L (ref 136–145)
SP GR UR STRIP: 1.02 (ref 1–1.03)
SQUAMOUS #/AREA URNS HPF: NORMAL /HPF
UROBILINOGEN UR QL STRIP: ABNORMAL
WBC # UR STRIP: NORMAL /HPF
WBC NRBC COR # BLD: 9 10*3/MM3 (ref 3.4–10.8)

## 2021-12-22 PROCEDURE — 74176 CT ABD & PELVIS W/O CONTRAST: CPT

## 2021-12-22 PROCEDURE — 83690 ASSAY OF LIPASE: CPT | Performed by: EMERGENCY MEDICINE

## 2021-12-22 PROCEDURE — 93005 ELECTROCARDIOGRAM TRACING: CPT | Performed by: EMERGENCY MEDICINE

## 2021-12-22 PROCEDURE — 99283 EMERGENCY DEPT VISIT LOW MDM: CPT

## 2021-12-22 PROCEDURE — 81001 URINALYSIS AUTO W/SCOPE: CPT | Performed by: EMERGENCY MEDICINE

## 2021-12-22 PROCEDURE — 80053 COMPREHEN METABOLIC PANEL: CPT | Performed by: EMERGENCY MEDICINE

## 2021-12-22 PROCEDURE — 85025 COMPLETE CBC W/AUTO DIFF WBC: CPT | Performed by: EMERGENCY MEDICINE

## 2021-12-22 PROCEDURE — 0 IOPAMIDOL PER 1 ML: Performed by: EMERGENCY MEDICINE

## 2021-12-22 PROCEDURE — 71275 CT ANGIOGRAPHY CHEST: CPT

## 2021-12-22 RX ADMIN — IOPAMIDOL 100 ML: 755 INJECTION, SOLUTION INTRAVENOUS at 05:41

## 2022-08-02 ENCOUNTER — TRANSCRIBE ORDERS (OUTPATIENT)
Dept: ADMINISTRATIVE | Facility: HOSPITAL | Age: 52
End: 2022-08-02

## 2022-08-02 DIAGNOSIS — Z11.59 SPECIAL SCREENING EXAMINATION FOR VIRAL DISEASE: Primary | ICD-10-CM

## 2022-08-02 DIAGNOSIS — K21.9 CHRONIC GERD: Primary | ICD-10-CM

## 2022-08-14 ENCOUNTER — APPOINTMENT (OUTPATIENT)
Dept: PREADMISSION TESTING | Facility: HOSPITAL | Age: 52
End: 2022-08-14

## 2022-08-16 ENCOUNTER — HOSPITAL ENCOUNTER (OUTPATIENT)
Facility: HOSPITAL | Age: 52
Setting detail: HOSPITAL OUTPATIENT SURGERY
Discharge: HOME | End: 2022-08-16
Attending: SURGERY | Admitting: SURGERY

## 2022-08-16 PROCEDURE — 91034 GASTROESOPHAGEAL REFLUX TEST: CPT | Performed by: SURGERY

## 2022-08-16 PROCEDURE — 91010 ESOPHAGUS MOTILITY STUDY: CPT | Performed by: SURGERY

## 2022-08-16 RX ORDER — LIDOCAINE HYDROCHLORIDE 20 MG/ML
SOLUTION OROPHARYNGEAL AS NEEDED
Status: DISCONTINUED | OUTPATIENT
Start: 2022-08-16 | End: 2022-08-16 | Stop reason: HOSPADM

## 2022-08-23 ENCOUNTER — APPOINTMENT (OUTPATIENT)
Dept: PREADMISSION TESTING | Facility: HOSPITAL | Age: 52
End: 2022-08-23

## 2022-08-23 ENCOUNTER — HOSPITAL ENCOUNTER (OUTPATIENT)
Facility: HOSPITAL | Age: 52
Setting detail: HOSPITAL OUTPATIENT SURGERY
Discharge: HOME | End: 2022-08-23
Attending: SURGERY | Admitting: SURGERY

## 2022-08-23 PROCEDURE — 91034 GASTROESOPHAGEAL REFLUX TEST: CPT | Performed by: SURGERY

## 2022-08-23 RX ORDER — LIDOCAINE HYDROCHLORIDE 20 MG/ML
SOLUTION OROPHARYNGEAL AS NEEDED
Status: DISCONTINUED | OUTPATIENT
Start: 2022-08-23 | End: 2022-08-23 | Stop reason: HOSPADM

## 2022-08-26 PROCEDURE — 88305 TISSUE EXAM BY PATHOLOGIST: CPT | Performed by: SURGERY

## 2022-08-29 ENCOUNTER — LAB REQUISITION (OUTPATIENT)
Dept: LAB | Facility: HOSPITAL | Age: 52
End: 2022-08-29

## 2022-08-29 DIAGNOSIS — K21.9 GASTRO-ESOPHAGEAL REFLUX DISEASE WITHOUT ESOPHAGITIS: ICD-10-CM

## 2022-08-30 LAB
CYTO UR: NORMAL
LAB AP CASE REPORT: NORMAL
LAB AP CLINICAL INFORMATION: NORMAL
PATH REPORT.FINAL DX SPEC: NORMAL
PATH REPORT.GROSS SPEC: NORMAL

## 2022-09-02 ENCOUNTER — HOSPITAL ENCOUNTER (OUTPATIENT)
Dept: NUCLEAR MEDICINE | Facility: HOSPITAL | Age: 52
Discharge: HOME | End: 2022-09-02

## 2022-09-02 DIAGNOSIS — K21.9 CHRONIC GERD: ICD-10-CM

## 2022-09-02 PROCEDURE — 0 TECHNETIUM SULFUR COLLOID: Performed by: SURGERY

## 2022-09-02 PROCEDURE — A9541 TC99M SULFUR COLLOID: HCPCS | Performed by: SURGERY

## 2022-09-02 PROCEDURE — 78264 GASTRIC EMPTYING IMG STUDY: CPT

## 2022-09-02 RX ADMIN — TECHNETIUM TC 99M SULFUR COLLOID 1 DOSE: KIT at 08:40

## 2022-09-08 ENCOUNTER — APPOINTMENT (OUTPATIENT)
Dept: GENERAL RADIOLOGY | Facility: HOSPITAL | Age: 52
End: 2022-09-08

## 2022-09-08 ENCOUNTER — HOSPITAL ENCOUNTER (EMERGENCY)
Facility: HOSPITAL | Age: 52
Discharge: HOME | End: 2022-09-08
Attending: EMERGENCY MEDICINE | Admitting: EMERGENCY MEDICINE

## 2022-09-08 VITALS
HEIGHT: 65 IN | DIASTOLIC BLOOD PRESSURE: 87 MMHG | WEIGHT: 200 LBS | TEMPERATURE: 98.3 F | SYSTOLIC BLOOD PRESSURE: 121 MMHG | RESPIRATION RATE: 18 BRPM | BODY MASS INDEX: 33.32 KG/M2 | OXYGEN SATURATION: 94 % | HEART RATE: 55 BPM

## 2022-09-08 DIAGNOSIS — R07.9 CHEST PAIN, UNSPECIFIED TYPE: Primary | ICD-10-CM

## 2022-09-08 LAB
ALBUMIN SERPL-MCNC: 4.9 G/DL (ref 3.5–5.2)
ALBUMIN/GLOB SERPL: 1.6 G/DL
ALP SERPL-CCNC: 79 U/L (ref 39–117)
ALT SERPL W P-5'-P-CCNC: 43 U/L (ref 1–41)
ANION GAP SERPL CALCULATED.3IONS-SCNC: 10 MMOL/L (ref 5–15)
AST SERPL-CCNC: 33 U/L (ref 1–40)
BASOPHILS # BLD AUTO: 0.04 10*3/MM3 (ref 0–0.2)
BASOPHILS NFR BLD AUTO: 0.5 % (ref 0–1.5)
BILIRUB SERPL-MCNC: 0.6 MG/DL (ref 0–1.2)
BUN SERPL-MCNC: 14 MG/DL (ref 6–20)
BUN/CREAT SERPL: 11.8 (ref 7–25)
CALCIUM SPEC-SCNC: 10.2 MG/DL (ref 8.6–10.5)
CHLORIDE SERPL-SCNC: 105 MMOL/L (ref 98–107)
CO2 SERPL-SCNC: 26 MMOL/L (ref 22–29)
CREAT SERPL-MCNC: 1.19 MG/DL (ref 0.76–1.27)
DEPRECATED RDW RBC AUTO: 43.1 FL (ref 37–54)
EGFRCR SERPLBLD CKD-EPI 2021: 72.6 ML/MIN/1.73
EOSINOPHIL # BLD AUTO: 0.08 10*3/MM3 (ref 0–0.4)
EOSINOPHIL NFR BLD AUTO: 1 % (ref 0.3–6.2)
ERYTHROCYTE [DISTWIDTH] IN BLOOD BY AUTOMATED COUNT: 13.2 % (ref 12.3–15.4)
GLOBULIN UR ELPH-MCNC: 3.1 GM/DL
GLUCOSE SERPL-MCNC: 111 MG/DL (ref 65–99)
HCT VFR BLD AUTO: 45.8 % (ref 37.5–51)
HGB BLD-MCNC: 15.7 G/DL (ref 13–17.7)
HOLD SPECIMEN: NORMAL
IMM GRANULOCYTES # BLD AUTO: 0.03 10*3/MM3 (ref 0–0.05)
IMM GRANULOCYTES NFR BLD AUTO: 0.4 % (ref 0–0.5)
LIPASE SERPL-CCNC: 31 U/L (ref 13–60)
LYMPHOCYTES # BLD AUTO: 2.52 10*3/MM3 (ref 0.7–3.1)
LYMPHOCYTES NFR BLD AUTO: 30.9 % (ref 19.6–45.3)
MCH RBC QN AUTO: 30.9 PG (ref 26.6–33)
MCHC RBC AUTO-ENTMCNC: 34.3 G/DL (ref 31.5–35.7)
MCV RBC AUTO: 90.2 FL (ref 79–97)
MONOCYTES # BLD AUTO: 0.34 10*3/MM3 (ref 0.1–0.9)
MONOCYTES NFR BLD AUTO: 4.2 % (ref 5–12)
NEUTROPHILS NFR BLD AUTO: 5.14 10*3/MM3 (ref 1.7–7)
NEUTROPHILS NFR BLD AUTO: 63 % (ref 42.7–76)
NRBC BLD AUTO-RTO: 0 /100 WBC (ref 0–0.2)
NT-PROBNP SERPL-MCNC: 32.2 PG/ML (ref 0–900)
PLATELET # BLD AUTO: 219 10*3/MM3 (ref 140–450)
PMV BLD AUTO: 10.3 FL (ref 6–12)
POTASSIUM SERPL-SCNC: 4.4 MMOL/L (ref 3.5–5.2)
PROT SERPL-MCNC: 8 G/DL (ref 6–8.5)
RBC # BLD AUTO: 5.08 10*6/MM3 (ref 4.14–5.8)
SODIUM SERPL-SCNC: 141 MMOL/L (ref 136–145)
TROPONIN T SERPL-MCNC: <0.01 NG/ML (ref 0–0.03)
TROPONIN T SERPL-MCNC: <0.01 NG/ML (ref 0–0.03)
WBC NRBC COR # BLD: 8.15 10*3/MM3 (ref 3.4–10.8)
WHOLE BLOOD HOLD COAG: NORMAL
WHOLE BLOOD HOLD SPECIMEN: NORMAL

## 2022-09-08 PROCEDURE — 36415 COLL VENOUS BLD VENIPUNCTURE: CPT

## 2022-09-08 PROCEDURE — 93005 ELECTROCARDIOGRAM TRACING: CPT

## 2022-09-08 PROCEDURE — 83880 ASSAY OF NATRIURETIC PEPTIDE: CPT

## 2022-09-08 PROCEDURE — 80053 COMPREHEN METABOLIC PANEL: CPT | Performed by: EMERGENCY MEDICINE

## 2022-09-08 PROCEDURE — 93005 ELECTROCARDIOGRAM TRACING: CPT | Performed by: EMERGENCY MEDICINE

## 2022-09-08 PROCEDURE — 71045 X-RAY EXAM CHEST 1 VIEW: CPT

## 2022-09-08 PROCEDURE — 85025 COMPLETE CBC W/AUTO DIFF WBC: CPT

## 2022-09-08 PROCEDURE — 99284 EMERGENCY DEPT VISIT MOD MDM: CPT

## 2022-09-08 PROCEDURE — 83690 ASSAY OF LIPASE: CPT | Performed by: EMERGENCY MEDICINE

## 2022-09-08 PROCEDURE — 84484 ASSAY OF TROPONIN QUANT: CPT | Performed by: EMERGENCY MEDICINE

## 2022-09-08 RX ORDER — SODIUM CHLORIDE 0.9 % (FLUSH) 0.9 %
10 SYRINGE (ML) INJECTION AS NEEDED
Status: DISCONTINUED | OUTPATIENT
Start: 2022-09-08 | End: 2022-09-08 | Stop reason: HOSPADM

## 2022-09-08 RX ORDER — ASPIRIN 81 MG/1
324 TABLET, CHEWABLE ORAL ONCE
Status: COMPLETED | OUTPATIENT
Start: 2022-09-08 | End: 2022-09-08

## 2022-09-08 RX ADMIN — ASPIRIN 81 MG CHEWABLE TABLET 324 MG: 81 TABLET CHEWABLE at 16:12

## 2022-09-08 NOTE — DISCHARGE INSTRUCTIONS
Follow-up with cardiology for repeat evaluation within the next week.    Return to the ER with any further concern.

## 2022-09-09 LAB
QT INTERVAL: 414 MS
QT INTERVAL: 440 MS
QTC INTERVAL: 402 MS
QTC INTERVAL: 409 MS

## 2022-09-13 ENCOUNTER — HOSPITAL ENCOUNTER (INPATIENT)
Facility: HOSPITAL | Age: 52
LOS: 1 days | Discharge: HOME | End: 2022-09-15
Attending: EMERGENCY MEDICINE | Admitting: INTERNAL MEDICINE

## 2022-09-13 ENCOUNTER — APPOINTMENT (OUTPATIENT)
Dept: GENERAL RADIOLOGY | Facility: HOSPITAL | Age: 52
End: 2022-09-13

## 2022-09-13 DIAGNOSIS — R11.0 NAUSEA: ICD-10-CM

## 2022-09-13 DIAGNOSIS — I25.110 CORONARY ARTERY DISEASE INVOLVING NATIVE CORONARY ARTERY OF NATIVE HEART WITH UNSTABLE ANGINA PECTORIS: ICD-10-CM

## 2022-09-13 DIAGNOSIS — I20.8 STABLE ANGINA PECTORIS: ICD-10-CM

## 2022-09-13 DIAGNOSIS — R07.9 NONSPECIFIC CHEST PAIN: Primary | ICD-10-CM

## 2022-09-13 PROBLEM — Q60.0 SOLITARY KIDNEY, CONGENITAL: Status: ACTIVE | Noted: 2022-09-13

## 2022-09-13 PROBLEM — K21.9 GERD (GASTROESOPHAGEAL REFLUX DISEASE): Status: ACTIVE | Noted: 2022-09-13

## 2022-09-13 PROBLEM — E78.5 HYPERLIPEMIA: Status: ACTIVE | Noted: 2022-09-13

## 2022-09-13 PROBLEM — I25.10 CAD (CORONARY ARTERY DISEASE): Status: ACTIVE | Noted: 2022-09-13

## 2022-09-13 PROBLEM — I10 ESSENTIAL HYPERTENSION: Status: ACTIVE | Noted: 2022-09-13

## 2022-09-13 LAB
ALBUMIN SERPL-MCNC: 4.7 G/DL (ref 3.5–5.2)
ALBUMIN/GLOB SERPL: 1.7 G/DL
ALP SERPL-CCNC: 78 U/L (ref 39–117)
ALT SERPL W P-5'-P-CCNC: 34 U/L (ref 1–41)
ANION GAP SERPL CALCULATED.3IONS-SCNC: 13 MMOL/L (ref 5–15)
AST SERPL-CCNC: 27 U/L (ref 1–40)
BASOPHILS # BLD AUTO: 0.04 10*3/MM3 (ref 0–0.2)
BASOPHILS NFR BLD AUTO: 0.4 % (ref 0–1.5)
BILIRUB SERPL-MCNC: 0.5 MG/DL (ref 0–1.2)
BUN SERPL-MCNC: 17 MG/DL (ref 6–20)
BUN/CREAT SERPL: 14.4 (ref 7–25)
CALCIUM SPEC-SCNC: 9.6 MG/DL (ref 8.6–10.5)
CHLORIDE SERPL-SCNC: 104 MMOL/L (ref 98–107)
CO2 SERPL-SCNC: 23 MMOL/L (ref 22–29)
CREAT SERPL-MCNC: 1.18 MG/DL (ref 0.76–1.27)
DEPRECATED RDW RBC AUTO: 43.2 FL (ref 37–54)
EGFRCR SERPLBLD CKD-EPI 2021: 73.3 ML/MIN/1.73
EOSINOPHIL # BLD AUTO: 0.13 10*3/MM3 (ref 0–0.4)
EOSINOPHIL NFR BLD AUTO: 1.2 % (ref 0.3–6.2)
ERYTHROCYTE [DISTWIDTH] IN BLOOD BY AUTOMATED COUNT: 13.2 % (ref 12.3–15.4)
GLOBULIN UR ELPH-MCNC: 2.8 GM/DL
GLUCOSE SERPL-MCNC: 91 MG/DL (ref 65–99)
HCT VFR BLD AUTO: 44.1 % (ref 37.5–51)
HGB BLD-MCNC: 15.2 G/DL (ref 13–17.7)
HOLD SPECIMEN: NORMAL
HOLD SPECIMEN: NORMAL
IMM GRANULOCYTES # BLD AUTO: 0.02 10*3/MM3 (ref 0–0.05)
IMM GRANULOCYTES NFR BLD AUTO: 0.2 % (ref 0–0.5)
LIPASE SERPL-CCNC: 35 U/L (ref 13–60)
LYMPHOCYTES # BLD AUTO: 3.59 10*3/MM3 (ref 0.7–3.1)
LYMPHOCYTES NFR BLD AUTO: 32.8 % (ref 19.6–45.3)
MCH RBC QN AUTO: 31.2 PG (ref 26.6–33)
MCHC RBC AUTO-ENTMCNC: 34.5 G/DL (ref 31.5–35.7)
MCV RBC AUTO: 90.6 FL (ref 79–97)
MONOCYTES # BLD AUTO: 0.63 10*3/MM3 (ref 0.1–0.9)
MONOCYTES NFR BLD AUTO: 5.8 % (ref 5–12)
NEUTROPHILS NFR BLD AUTO: 59.6 % (ref 42.7–76)
NEUTROPHILS NFR BLD AUTO: 6.54 10*3/MM3 (ref 1.7–7)
NRBC BLD AUTO-RTO: 0 /100 WBC (ref 0–0.2)
NT-PROBNP SERPL-MCNC: 27.1 PG/ML (ref 0–900)
PLATELET # BLD AUTO: 210 10*3/MM3 (ref 140–450)
PMV BLD AUTO: 10.4 FL (ref 6–12)
POTASSIUM SERPL-SCNC: 3.8 MMOL/L (ref 3.5–5.2)
PROT SERPL-MCNC: 7.5 G/DL (ref 6–8.5)
RBC # BLD AUTO: 4.87 10*6/MM3 (ref 4.14–5.8)
SODIUM SERPL-SCNC: 140 MMOL/L (ref 136–145)
TROPONIN T SERPL-MCNC: <0.01 NG/ML (ref 0–0.03)
TROPONIN T SERPL-MCNC: <0.01 NG/ML (ref 0–0.03)
WBC NRBC COR # BLD: 10.95 10*3/MM3 (ref 3.4–10.8)
WHOLE BLOOD HOLD COAG: NORMAL
WHOLE BLOOD HOLD SPECIMEN: NORMAL

## 2022-09-13 PROCEDURE — 83880 ASSAY OF NATRIURETIC PEPTIDE: CPT

## 2022-09-13 PROCEDURE — G0378 HOSPITAL OBSERVATION PER HR: HCPCS

## 2022-09-13 PROCEDURE — 84484 ASSAY OF TROPONIN QUANT: CPT | Performed by: EMERGENCY MEDICINE

## 2022-09-13 PROCEDURE — 99223 1ST HOSP IP/OBS HIGH 75: CPT | Performed by: INTERNAL MEDICINE

## 2022-09-13 PROCEDURE — 93005 ELECTROCARDIOGRAM TRACING: CPT

## 2022-09-13 PROCEDURE — 80053 COMPREHEN METABOLIC PANEL: CPT

## 2022-09-13 PROCEDURE — 99284 EMERGENCY DEPT VISIT MOD MDM: CPT

## 2022-09-13 PROCEDURE — 36415 COLL VENOUS BLD VENIPUNCTURE: CPT

## 2022-09-13 PROCEDURE — 81001 URINALYSIS AUTO W/SCOPE: CPT | Performed by: NURSE PRACTITIONER

## 2022-09-13 PROCEDURE — 71045 X-RAY EXAM CHEST 1 VIEW: CPT

## 2022-09-13 PROCEDURE — 85025 COMPLETE CBC W/AUTO DIFF WBC: CPT

## 2022-09-13 PROCEDURE — 84484 ASSAY OF TROPONIN QUANT: CPT

## 2022-09-13 PROCEDURE — 93005 ELECTROCARDIOGRAM TRACING: CPT | Performed by: EMERGENCY MEDICINE

## 2022-09-13 PROCEDURE — 83690 ASSAY OF LIPASE: CPT

## 2022-09-13 RX ORDER — LIDOCAINE HYDROCHLORIDE 20 MG/ML
15 SOLUTION OROPHARYNGEAL ONCE
Status: COMPLETED | OUTPATIENT
Start: 2022-09-13 | End: 2022-09-13

## 2022-09-13 RX ORDER — ALUMINA, MAGNESIA, AND SIMETHICONE 2400; 2400; 240 MG/30ML; MG/30ML; MG/30ML
15 SUSPENSION ORAL ONCE
Status: COMPLETED | OUTPATIENT
Start: 2022-09-13 | End: 2022-09-13

## 2022-09-13 RX ORDER — SODIUM CHLORIDE 0.9 % (FLUSH) 0.9 %
10 SYRINGE (ML) INJECTION AS NEEDED
Status: DISCONTINUED | OUTPATIENT
Start: 2022-09-13 | End: 2022-09-15 | Stop reason: HOSPADM

## 2022-09-13 RX ORDER — SODIUM CHLORIDE 0.9 % (FLUSH) 0.9 %
10 SYRINGE (ML) INJECTION EVERY 12 HOURS SCHEDULED
Status: DISCONTINUED | OUTPATIENT
Start: 2022-09-14 | End: 2022-09-15 | Stop reason: HOSPADM

## 2022-09-13 RX ORDER — ONDANSETRON 4 MG/1
4 TABLET, FILM COATED ORAL EVERY 6 HOURS PRN
Status: DISCONTINUED | OUTPATIENT
Start: 2022-09-13 | End: 2022-09-15 | Stop reason: HOSPADM

## 2022-09-13 RX ORDER — ACETAMINOPHEN 325 MG/1
650 TABLET ORAL EVERY 4 HOURS PRN
Status: DISCONTINUED | OUTPATIENT
Start: 2022-09-13 | End: 2022-09-14 | Stop reason: SDUPTHER

## 2022-09-13 RX ORDER — NITROGLYCERIN 0.4 MG/1
0.4 TABLET SUBLINGUAL
Status: DISCONTINUED | OUTPATIENT
Start: 2022-09-13 | End: 2022-09-15 | Stop reason: HOSPADM

## 2022-09-13 RX ORDER — ROSUVASTATIN CALCIUM 20 MG/1
40 TABLET, COATED ORAL DAILY
Status: DISCONTINUED | OUTPATIENT
Start: 2022-09-14 | End: 2022-09-15 | Stop reason: HOSPADM

## 2022-09-13 RX ORDER — PANTOPRAZOLE SODIUM 40 MG/1
40 TABLET, DELAYED RELEASE ORAL EVERY MORNING
Status: DISCONTINUED | OUTPATIENT
Start: 2022-09-14 | End: 2022-09-13 | Stop reason: SDUPTHER

## 2022-09-13 RX ORDER — ACETAMINOPHEN 160 MG/5ML
650 SOLUTION ORAL EVERY 4 HOURS PRN
Status: DISCONTINUED | OUTPATIENT
Start: 2022-09-13 | End: 2022-09-14 | Stop reason: SDUPTHER

## 2022-09-13 RX ORDER — ONDANSETRON 2 MG/ML
4 INJECTION INTRAMUSCULAR; INTRAVENOUS EVERY 6 HOURS PRN
Status: DISCONTINUED | OUTPATIENT
Start: 2022-09-13 | End: 2022-09-15 | Stop reason: HOSPADM

## 2022-09-13 RX ORDER — FAMOTIDINE 20 MG/1
20 TABLET, FILM COATED ORAL 2 TIMES DAILY
Status: DISCONTINUED | OUTPATIENT
Start: 2022-09-14 | End: 2022-09-15 | Stop reason: HOSPADM

## 2022-09-13 RX ORDER — ASPIRIN 81 MG/1
324 TABLET, CHEWABLE ORAL ONCE
Status: COMPLETED | OUTPATIENT
Start: 2022-09-13 | End: 2022-09-13

## 2022-09-13 RX ORDER — ACETAMINOPHEN 650 MG/1
650 SUPPOSITORY RECTAL EVERY 4 HOURS PRN
Status: DISCONTINUED | OUTPATIENT
Start: 2022-09-13 | End: 2022-09-14 | Stop reason: SDUPTHER

## 2022-09-13 RX ORDER — PANTOPRAZOLE SODIUM 40 MG/1
40 TABLET, DELAYED RELEASE ORAL
Status: DISCONTINUED | OUTPATIENT
Start: 2022-09-14 | End: 2022-09-15 | Stop reason: HOSPADM

## 2022-09-13 RX ORDER — ASPIRIN 81 MG/1
81 TABLET ORAL DAILY
Status: DISCONTINUED | OUTPATIENT
Start: 2022-09-14 | End: 2022-09-15 | Stop reason: HOSPADM

## 2022-09-13 RX ADMIN — NITROGLYCERIN 0.5 INCH: 20 OINTMENT TOPICAL at 23:07

## 2022-09-13 RX ADMIN — LIDOCAINE HYDROCHLORIDE 15 ML: 20 SOLUTION OROPHARYNGEAL at 22:21

## 2022-09-13 RX ADMIN — ALUMINUM HYDROXIDE, MAGNESIUM HYDROXIDE, AND DIMETHICONE 15 ML: 400; 400; 40 SUSPENSION ORAL at 22:21

## 2022-09-13 RX ADMIN — ASPIRIN 81 MG 324 MG: 81 TABLET ORAL at 22:20

## 2022-09-14 ENCOUNTER — APPOINTMENT (OUTPATIENT)
Dept: CT IMAGING | Facility: HOSPITAL | Age: 52
End: 2022-09-14

## 2022-09-14 ENCOUNTER — APPOINTMENT (OUTPATIENT)
Dept: CARDIOLOGY | Facility: HOSPITAL | Age: 52
End: 2022-09-14

## 2022-09-14 LAB
ANION GAP SERPL CALCULATED.3IONS-SCNC: 12 MMOL/L (ref 5–15)
BACTERIA UR QL AUTO: NORMAL /HPF
BASOPHILS # BLD AUTO: 0.03 10*3/MM3 (ref 0–0.2)
BASOPHILS NFR BLD AUTO: 0.4 % (ref 0–1.5)
BH CV ECHO MEAS - AO MAX PG: 9 MMHG
BH CV ECHO MEAS - AO MEAN PG: 4 MMHG
BH CV ECHO MEAS - AO ROOT DIAM: 3.7 CM
BH CV ECHO MEAS - AO V2 MAX: 150 CM/SEC
BH CV ECHO MEAS - AO V2 VTI: 35.8 CM
BH CV ECHO MEAS - AVA(I,D): 2.04 CM2
BH CV ECHO MEAS - EDV(CUBED): 117.6 ML
BH CV ECHO MEAS - EDV(MOD-SP2): 59.8 ML
BH CV ECHO MEAS - EDV(MOD-SP4): 73.8 ML
BH CV ECHO MEAS - EF(MOD-BP): 53.9 %
BH CV ECHO MEAS - EF(MOD-SP2): 56.4 %
BH CV ECHO MEAS - EF(MOD-SP4): 51.4 %
BH CV ECHO MEAS - ESV(CUBED): 46.7 ML
BH CV ECHO MEAS - ESV(MOD-SP2): 26.1 ML
BH CV ECHO MEAS - ESV(MOD-SP4): 35.9 ML
BH CV ECHO MEAS - FS: 26.5 %
BH CV ECHO MEAS - IVS/LVPW: 0.9 CM
BH CV ECHO MEAS - IVSD: 0.9 CM
BH CV ECHO MEAS - LA DIMENSION: 3.1 CM
BH CV ECHO MEAS - LAT PEAK E' VEL: 11.2 CM/SEC
BH CV ECHO MEAS - LV DIASTOLIC VOL/BSA (35-75): 37.2 CM2
BH CV ECHO MEAS - LV MASS(C)D: 164.3 GRAMS
BH CV ECHO MEAS - LV MAX PG: 4.1 MMHG
BH CV ECHO MEAS - LV MEAN PG: 2 MMHG
BH CV ECHO MEAS - LV SYSTOLIC VOL/BSA (12-30): 18.1 CM2
BH CV ECHO MEAS - LV V1 MAX: 101 CM/SEC
BH CV ECHO MEAS - LV V1 VTI: 23.2 CM
BH CV ECHO MEAS - LVIDD: 4.9 CM
BH CV ECHO MEAS - LVIDS: 3.6 CM
BH CV ECHO MEAS - LVOT AREA: 3.1 CM2
BH CV ECHO MEAS - LVOT DIAM: 2 CM
BH CV ECHO MEAS - LVPWD: 1 CM
BH CV ECHO MEAS - MED PEAK E' VEL: 8.8 CM/SEC
BH CV ECHO MEAS - MV A MAX VEL: 70.7 CM/SEC
BH CV ECHO MEAS - MV DEC SLOPE: 203 CM/SEC2
BH CV ECHO MEAS - MV DEC TIME: 0.33 MSEC
BH CV ECHO MEAS - MV E MAX VEL: 67.6 CM/SEC
BH CV ECHO MEAS - MV E/A: 0.96
BH CV ECHO MEAS - MV MAX PG: 2.43 MMHG
BH CV ECHO MEAS - MV MEAN PG: 1 MMHG
BH CV ECHO MEAS - MV V2 VTI: 41.9 CM
BH CV ECHO MEAS - MVA(VTI): 1.74 CM2
BH CV ECHO MEAS - PA ACC TIME: 0.16 SEC
BH CV ECHO MEAS - PA PR(ACCEL): 8.6 MMHG
BH CV ECHO MEAS - PA V2 MAX: 102.3 CM/SEC
BH CV ECHO MEAS - RAP SYSTOLE: 3 MMHG
BH CV ECHO MEAS - RVSP: 20 MMHG
BH CV ECHO MEAS - SI(MOD-SP2): 17 ML/M2
BH CV ECHO MEAS - SI(MOD-SP4): 19.1 ML/M2
BH CV ECHO MEAS - SV(LVOT): 72.9 ML
BH CV ECHO MEAS - SV(MOD-SP2): 33.7 ML
BH CV ECHO MEAS - SV(MOD-SP4): 37.9 ML
BH CV ECHO MEAS - TAPSE (>1.6): 2.44 CM
BH CV ECHO MEAS - TR MAX PG: 17.7 MMHG
BH CV ECHO MEAS - TR MAX VEL: 210 CM/SEC
BH CV ECHO MEASUREMENTS AVERAGE E/E' RATIO: 6.76
BH CV VAS BP RIGHT ARM: NORMAL MMHG
BH CV XLRA - RV BASE: 3.5 CM
BH CV XLRA - RV LENGTH: 6.5 CM
BH CV XLRA - RV MID: 3.2 CM
BH CV XLRA - TDI S': 9.9 CM/SEC
BILIRUB UR QL STRIP: NEGATIVE
BUN SERPL-MCNC: 17 MG/DL (ref 6–20)
BUN/CREAT SERPL: 16.8 (ref 7–25)
CALCIUM SPEC-SCNC: 9.5 MG/DL (ref 8.6–10.5)
CHLORIDE SERPL-SCNC: 103 MMOL/L (ref 98–107)
CHOLEST SERPL-MCNC: 115 MG/DL (ref 0–200)
CLARITY UR: CLEAR
CO2 SERPL-SCNC: 24 MMOL/L (ref 22–29)
COLOR UR: YELLOW
CREAT SERPL-MCNC: 1.01 MG/DL (ref 0.76–1.27)
D-LACTATE SERPL-SCNC: 0.8 MMOL/L (ref 0.5–2)
DEPRECATED RDW RBC AUTO: 44.7 FL (ref 37–54)
EGFRCR SERPLBLD CKD-EPI 2021: 88.4 ML/MIN/1.73
EOSINOPHIL # BLD AUTO: 0.11 10*3/MM3 (ref 0–0.4)
EOSINOPHIL NFR BLD AUTO: 1.4 % (ref 0.3–6.2)
ERYTHROCYTE [DISTWIDTH] IN BLOOD BY AUTOMATED COUNT: 13.3 % (ref 12.3–15.4)
GLUCOSE SERPL-MCNC: 102 MG/DL (ref 65–99)
GLUCOSE UR STRIP-MCNC: NEGATIVE MG/DL
HBA1C MFR BLD: 6.3 % (ref 4.8–5.6)
HCT VFR BLD AUTO: 42.3 % (ref 37.5–51)
HDLC SERPL-MCNC: 33 MG/DL (ref 40–60)
HGB BLD-MCNC: 14.3 G/DL (ref 13–17.7)
HGB UR QL STRIP.AUTO: NEGATIVE
HOLD SPECIMEN: NORMAL
HYALINE CASTS UR QL AUTO: NORMAL /LPF
IMM GRANULOCYTES # BLD AUTO: 0.02 10*3/MM3 (ref 0–0.05)
IMM GRANULOCYTES NFR BLD AUTO: 0.3 % (ref 0–0.5)
KETONES UR QL STRIP: NEGATIVE
LDLC SERPL CALC-MCNC: 46 MG/DL (ref 0–100)
LDLC/HDLC SERPL: 1.12 {RATIO}
LEFT ATRIUM VOLUME INDEX: 11.7 ML/M2
LEUKOCYTE ESTERASE UR QL STRIP.AUTO: NEGATIVE
LYMPHOCYTES # BLD AUTO: 3 10*3/MM3 (ref 0.7–3.1)
LYMPHOCYTES NFR BLD AUTO: 39.4 % (ref 19.6–45.3)
MAXIMAL PREDICTED HEART RATE: 166 BPM
MCH RBC QN AUTO: 31 PG (ref 26.6–33)
MCHC RBC AUTO-ENTMCNC: 33.8 G/DL (ref 31.5–35.7)
MCV RBC AUTO: 91.6 FL (ref 79–97)
MONOCYTES # BLD AUTO: 0.5 10*3/MM3 (ref 0.1–0.9)
MONOCYTES NFR BLD AUTO: 6.6 % (ref 5–12)
NEUTROPHILS NFR BLD AUTO: 3.96 10*3/MM3 (ref 1.7–7)
NEUTROPHILS NFR BLD AUTO: 51.9 % (ref 42.7–76)
NITRITE UR QL STRIP: NEGATIVE
NRBC BLD AUTO-RTO: 0 /100 WBC (ref 0–0.2)
PH UR STRIP.AUTO: 5.5 [PH] (ref 5–8)
PLATELET # BLD AUTO: 199 10*3/MM3 (ref 140–450)
PMV BLD AUTO: 10.9 FL (ref 6–12)
POTASSIUM SERPL-SCNC: 4 MMOL/L (ref 3.5–5.2)
PROCALCITONIN SERPL-MCNC: 0.04 NG/ML (ref 0–0.25)
PROT UR QL STRIP: ABNORMAL
RBC # BLD AUTO: 4.62 10*6/MM3 (ref 4.14–5.8)
RBC # UR STRIP: NORMAL /HPF
REF LAB TEST METHOD: NORMAL
SODIUM SERPL-SCNC: 139 MMOL/L (ref 136–145)
SP GR UR STRIP: 1.03 (ref 1–1.03)
SQUAMOUS #/AREA URNS HPF: NORMAL /HPF
STRESS TARGET HR: 141 BPM
TRIGL SERPL-MCNC: 226 MG/DL (ref 0–150)
TROPONIN T SERPL-MCNC: <0.01 NG/ML (ref 0–0.03)
UROBILINOGEN UR QL STRIP: ABNORMAL
VLDLC SERPL-MCNC: 36 MG/DL (ref 5–40)
WBC # UR STRIP: NORMAL /HPF
WBC NRBC COR # BLD: 7.62 10*3/MM3 (ref 3.4–10.8)

## 2022-09-14 PROCEDURE — 83605 ASSAY OF LACTIC ACID: CPT | Performed by: NURSE PRACTITIONER

## 2022-09-14 PROCEDURE — 93458 L HRT ARTERY/VENTRICLE ANGIO: CPT | Performed by: INTERNAL MEDICINE

## 2022-09-14 PROCEDURE — 25010000002 SULFUR HEXAFLUORIDE MICROSPH 60.7-25 MG RECONSTITUTED SUSPENSION: Performed by: INTERNAL MEDICINE

## 2022-09-14 PROCEDURE — C1769 GUIDE WIRE: HCPCS | Performed by: INTERNAL MEDICINE

## 2022-09-14 PROCEDURE — G0378 HOSPITAL OBSERVATION PER HR: HCPCS

## 2022-09-14 PROCEDURE — 99232 SBSQ HOSP IP/OBS MODERATE 35: CPT | Performed by: INTERNAL MEDICINE

## 2022-09-14 PROCEDURE — 0 IOPAMIDOL PER 1 ML: Performed by: INTERNAL MEDICINE

## 2022-09-14 PROCEDURE — C1887 CATHETER, GUIDING: HCPCS | Performed by: INTERNAL MEDICINE

## 2022-09-14 PROCEDURE — C9600 PERC DRUG-EL COR STENT SING: HCPCS | Performed by: INTERNAL MEDICINE

## 2022-09-14 PROCEDURE — C1725 CATH, TRANSLUMIN NON-LASER: HCPCS | Performed by: INTERNAL MEDICINE

## 2022-09-14 PROCEDURE — 84145 PROCALCITONIN (PCT): CPT | Performed by: NURSE PRACTITIONER

## 2022-09-14 PROCEDURE — 93005 ELECTROCARDIOGRAM TRACING: CPT | Performed by: INTERNAL MEDICINE

## 2022-09-14 PROCEDURE — B2151ZZ FLUOROSCOPY OF LEFT HEART USING LOW OSMOLAR CONTRAST: ICD-10-PCS | Performed by: INTERNAL MEDICINE

## 2022-09-14 PROCEDURE — 4A023N7 MEASUREMENT OF CARDIAC SAMPLING AND PRESSURE, LEFT HEART, PERCUTANEOUS APPROACH: ICD-10-PCS | Performed by: INTERNAL MEDICINE

## 2022-09-14 PROCEDURE — C1894 INTRO/SHEATH, NON-LASER: HCPCS | Performed by: INTERNAL MEDICINE

## 2022-09-14 PROCEDURE — 80061 LIPID PANEL: CPT | Performed by: NURSE PRACTITIONER

## 2022-09-14 PROCEDURE — 80048 BASIC METABOLIC PNL TOTAL CA: CPT | Performed by: NURSE PRACTITIONER

## 2022-09-14 PROCEDURE — 83036 HEMOGLOBIN GLYCOSYLATED A1C: CPT | Performed by: NURSE PRACTITIONER

## 2022-09-14 PROCEDURE — B2111ZZ FLUOROSCOPY OF MULTIPLE CORONARY ARTERIES USING LOW OSMOLAR CONTRAST: ICD-10-PCS | Performed by: INTERNAL MEDICINE

## 2022-09-14 PROCEDURE — 027034Z DILATION OF CORONARY ARTERY, ONE ARTERY WITH DRUG-ELUTING INTRALUMINAL DEVICE, PERCUTANEOUS APPROACH: ICD-10-PCS | Performed by: INTERNAL MEDICINE

## 2022-09-14 PROCEDURE — 25010000002 MIDAZOLAM PER 1 MG: Performed by: INTERNAL MEDICINE

## 2022-09-14 PROCEDURE — 92928 PRQ TCAT PLMT NTRAC ST 1 LES: CPT | Performed by: INTERNAL MEDICINE

## 2022-09-14 PROCEDURE — 25010000002 HEPARIN (PORCINE) PER 1000 UNITS: Performed by: INTERNAL MEDICINE

## 2022-09-14 PROCEDURE — 85025 COMPLETE CBC W/AUTO DIFF WBC: CPT | Performed by: NURSE PRACTITIONER

## 2022-09-14 PROCEDURE — 71275 CT ANGIOGRAPHY CHEST: CPT

## 2022-09-14 PROCEDURE — 84484 ASSAY OF TROPONIN QUANT: CPT | Performed by: NURSE PRACTITIONER

## 2022-09-14 PROCEDURE — C1874 STENT, COATED/COV W/DEL SYS: HCPCS | Performed by: INTERNAL MEDICINE

## 2022-09-14 PROCEDURE — 85347 COAGULATION TIME ACTIVATED: CPT

## 2022-09-14 PROCEDURE — 93306 TTE W/DOPPLER COMPLETE: CPT

## 2022-09-14 DEVICE — XIENCE SKYPOINT™ EVEROLIMUS ELUTING CORONARY STENT SYSTEM 4.00 MM X 23 MM / RAPID-EXCHANGE
Type: IMPLANTABLE DEVICE | Status: FUNCTIONAL
Brand: XIENCE SKYPOINT™

## 2022-09-14 RX ORDER — SODIUM CHLORIDE 9 MG/ML
3 INJECTION, SOLUTION INTRAVENOUS CONTINUOUS
Status: CANCELLED | OUTPATIENT
Start: 2022-09-14 | End: 2022-09-14

## 2022-09-14 RX ORDER — METOPROLOL SUCCINATE 25 MG/1
25 TABLET, EXTENDED RELEASE ORAL DAILY
Qty: 90 TABLET | Refills: 1 | Status: SHIPPED | OUTPATIENT
Start: 2022-09-14 | End: 2022-09-15 | Stop reason: HOSPADM

## 2022-09-14 RX ORDER — ACETAMINOPHEN 325 MG/1
650 TABLET ORAL EVERY 4 HOURS PRN
Status: DISCONTINUED | OUTPATIENT
Start: 2022-09-14 | End: 2022-09-15 | Stop reason: HOSPADM

## 2022-09-14 RX ORDER — SODIUM CHLORIDE 9 MG/ML
100 INJECTION, SOLUTION INTRAVENOUS CONTINUOUS
Status: ACTIVE | OUTPATIENT
Start: 2022-09-14 | End: 2022-09-14

## 2022-09-14 RX ORDER — LIDOCAINE HYDROCHLORIDE 10 MG/ML
INJECTION, SOLUTION EPIDURAL; INFILTRATION; INTRACAUDAL; PERINEURAL AS NEEDED
Status: DISCONTINUED | OUTPATIENT
Start: 2022-09-14 | End: 2022-09-14 | Stop reason: HOSPADM

## 2022-09-14 RX ORDER — PRASUGREL 5 MG/1
TABLET, FILM COATED ORAL AS NEEDED
Status: DISCONTINUED | OUTPATIENT
Start: 2022-09-14 | End: 2022-09-14 | Stop reason: HOSPADM

## 2022-09-14 RX ORDER — PRASUGREL 10 MG/1
10 TABLET, FILM COATED ORAL DAILY
Qty: 90 TABLET | Refills: 3 | Status: SHIPPED | OUTPATIENT
Start: 2022-09-14 | End: 2022-09-15 | Stop reason: SDUPTHER

## 2022-09-14 RX ORDER — NITROGLYCERIN 0.4 MG/1
TABLET SUBLINGUAL
Qty: 100 TABLET | Refills: 1 | Status: SHIPPED | OUTPATIENT
Start: 2022-09-14

## 2022-09-14 RX ORDER — MIDAZOLAM HYDROCHLORIDE 1 MG/ML
INJECTION INTRAMUSCULAR; INTRAVENOUS AS NEEDED
Status: DISCONTINUED | OUTPATIENT
Start: 2022-09-14 | End: 2022-09-14 | Stop reason: HOSPADM

## 2022-09-14 RX ORDER — NICARDIPINE HCL-0.9% SOD CHLOR 1 MG/10 ML
SYRINGE (ML) INTRAVENOUS AS NEEDED
Status: DISCONTINUED | OUTPATIENT
Start: 2022-09-14 | End: 2022-09-14 | Stop reason: HOSPADM

## 2022-09-14 RX ORDER — HEPARIN SODIUM 1000 [USP'U]/ML
INJECTION, SOLUTION INTRAVENOUS; SUBCUTANEOUS AS NEEDED
Status: DISCONTINUED | OUTPATIENT
Start: 2022-09-14 | End: 2022-09-14 | Stop reason: HOSPADM

## 2022-09-14 RX ADMIN — Medication 10 ML: at 00:35

## 2022-09-14 RX ADMIN — ROSUVASTATIN CALCIUM 40 MG: 20 TABLET, FILM COATED ORAL at 08:25

## 2022-09-14 RX ADMIN — ASPIRIN 81 MG: 81 TABLET, COATED ORAL at 08:26

## 2022-09-14 RX ADMIN — FAMOTIDINE 20 MG: 20 TABLET ORAL at 00:34

## 2022-09-14 RX ADMIN — Medication 10 ML: at 08:26

## 2022-09-14 RX ADMIN — SULFUR HEXAFLUORIDE 2 ML: KIT at 15:56

## 2022-09-14 RX ADMIN — ACETAMINOPHEN 650 MG: 325 TABLET, FILM COATED ORAL at 08:25

## 2022-09-14 RX ADMIN — FAMOTIDINE 20 MG: 20 TABLET ORAL at 08:26

## 2022-09-14 RX ADMIN — IOPAMIDOL 80 ML: 755 INJECTION, SOLUTION INTRAVENOUS at 09:18

## 2022-09-14 RX ADMIN — Medication 10 ML: at 21:00

## 2022-09-14 RX ADMIN — FAMOTIDINE 20 MG: 20 TABLET ORAL at 23:10

## 2022-09-14 RX ADMIN — PANTOPRAZOLE SODIUM 40 MG: 40 TABLET, DELAYED RELEASE ORAL at 05:08

## 2022-09-14 NOTE — CONSULTS
Cardiology Consult - [facility]    Geovanny Barnett     S210/1  1970  [address]  [facility] [address]         Admission Date:  9/13/2022    Consultation Date:  09/14/22        PCP:  Champ Feng MD  Referring MD:  Hospitalist service  Consulting MD:  Dr. Kam          CC:  Chest Pain    Reason for Consult: Chest Pain with mixed features.        Allergies:  has No Known Allergies.    Medications Prior to Admission   Medication Sig Dispense Refill Last Dose   • ALLOPURINOL PO Take  by mouth.      • aspirin 81 MG EC tablet Take 81 mg by mouth Daily.      • famotidine (PEPCID) 20 MG tablet Take 1 tablet by mouth 2 (Two) Times a Day. 30 tablet 0    • omeprazole (priLOSEC) 20 MG capsule Take 20 mg by mouth Daily.      • rosuvastatin (Crestor) 40 MG tablet Take 40 mg by mouth Daily.      • sucralfate (CARAFATE) 1 g tablet Take 1 tablet by mouth 4 (Four) Times a Day Before Meals & at Bedtime. (Patient not taking: No sig reported) 60 tablet 0             HPI:  Geovanny Barnett is a 53 yo CM with PMHx of HTN, HLP, GERD, Gout, Obesity, moderate ETOH intake, former tobacco abuse, solitary kidney, and known CAD with prior stenting of the LAD.  He presents to [facility] ED overnight with recurrent chest pain (was also in ED Friday night).  Pain is described as a mid sternal tightness that occurs at rest.  Associated nausea but no emesis.  He underwent nuclear MPS in our office last week.  His last catheterization was in 2015 at which time his LAD stent was patent with no other CAD, normal EF.      He was admitted to hospitalist service and Cardiology has been asked to see him in consultation.  Serial troponins are negative.  EKG shows NSR without acute findings.  Cr 1.18.  No leukocytosis.  He is currently pain free - reports headache (nitro paste on chest).  He is really concerned that something is wrong - he is getting headaches, he's fatigued, he just doesn't feel normal.  He reports  "breaking out in sweats/chills earlier last week.  Denies dizziness, denies edema, denies change in bowel/bladder habits, denies weight loss/gain.            Social History     Socioeconomic History   • Marital status: Single   • Number of children: 0   Tobacco Use   • Smoking status: Former Smoker     Packs/day: 0.25     Years: 19.00     Pack years: 4.75     Quit date:      Years since quittin.7   • Smokeless tobacco: Never Used   Vaping Use   • Vaping Use: Never used   Substance and Sexual Activity   • Alcohol use: Yes     Alcohol/week: 12.0 standard drinks     Types: 12 Cans of beer per week     Comment: \"6 beers every two weeks\"   • Drug use: No   • Sexual activity: Defer     Family History   Problem Relation Age of Onset   • Hypertension Mother    • Diabetes Mother    • Cancer Mother    • Cancer Father    • Heart valve disorder Father      Past Surgical History:   Procedure Laterality Date   • CORONARY ANGIOPLASTY WITH STENT PLACEMENT     • ESOPHAGEAL MOTILITY STUDY N/A 2022    Procedure: MANOMETRY;  Surgeon: Stanley Gasca MD;  Location:  [facility];  Service: Gastroenterology;  Laterality: N/A;  LES, per screen read out at 42.6 Patient tolerated esophageal manometry well and probe was advanced to 37cm   • GASTRIC PH MONITORING 24HR N/A 2022    Procedure: GASTRIC PH MONITORING 24HR;  Surgeon: Stanley Gasca MD;  Location:  [facility];  Service: General;  Laterality: N/A;  pH impedance probe dropped to 3.5cm  Les from manometry testing registered at 42.6cm   • HIP SURGERY Bilateral      ROS: Pertinent items are noted in HPI, all other systems reviewed and negative     Objective     height is 165.1 cm (65\") and weight is 91.5 kg (201 lb 12.8 oz). His oral temperature is 97.4 °F (36.3 °C). His blood pressure is 100/71 and his pulse is 55. His respiration is 18 and oxygen saturation is 93%.      Intake/Output Summary (Last 24 hours) at 2022 0756  Last data filed at 2022 " 2357  Gross per 24 hour   Intake --   Output 200 ml   Net -200 ml     Intake/Output                 09/13/22 0701 - 09/14/22 0700     [id number] Total              Intake    Total Intake -- -- --       Output    Urine  --  200 200    Total Output -- 200 200             09/13/22 1952 09/13/22  2320 09/14/22  0500   Weight: 90.7 kg (200 lb) 91.5 kg (201 lb 12.8 oz) 91.5 kg (201 lb 12.8 oz)          Physical Exam:  General Appearance:    Alert, cooperative, in no acute distress   Head:    Normocephalic, without obvious abnormality, atraumatic   Eyes:            Lids and lashes normal, conjunctivae and sclerae normal, no   icterus, no pallor, corneas clear, PERRLA   Ears:    Ears appear intact with no abnormalities noted   Throat:   No oral lesions, no thrush, oral mucosa moist   Neck:   No adenopathy, supple, trachea midline, no thyromegaly, no carotid bruit, no JVD   Back:     No kyphosis present, no scoliosis present, no skin lesions,    erythema or scars, no tenderness to percussion or                   palpation, range of motion normal   Lungs:     Clear to auscultation,respirations regular, even and               unlabored    Heart:    Regular rhythm and normal rate, normal S1 and S2, no         murmur, no gallop, no rub, no click   Abdomen:     Normal bowel sounds, no masses, no organomegaly, soft     nontender, nondistended, no guarding, no rebound   tenderness   Extremities:   Moves all extremities well,  no cyanosis, no redness, no edema   Pulses:   Pulses palpable and equal bilaterally   Skin:   No bleeding, bruising or rash   Lymph nodes:   No palpable adenopathy   Neurologic:   Cranial nerves 2 - 12 grossly intact, sensation intact, DTR     present and equal bilaterally          Results Review:  I personally reviewed the patient's clinical results.  Results from last 7 days   Lab Units 09/14/22  0649   WBC 10*3/mm3 7.62   HEMOGLOBIN g/dL 14.3   HEMATOCRIT % 42.3   PLATELETS 10*3/mm3 199      Results from last 7 days   Lab Units 09/13/22 1959   SODIUM mmol/L 140   POTASSIUM mmol/L 3.8   CHLORIDE mmol/L 104   CO2 mmol/L 23.0   BUN mg/dL 17   CREATININE mg/dL 1.18   CALCIUM mg/dL 9.6   BILIRUBIN mg/dL 0.5   ALK PHOS U/L 78   ALT (SGPT) U/L 34   AST (SGOT) U/L 27   GLUCOSE mg/dL 91                 Results from last 7 days   Lab Units 09/13/22 1959   PROBNP pg/mL 27.1             Radiology:  Imaging Results (Last 72 Hours)     Procedure Component Value Units Date/Time    XR Chest 1 View [[id number]] Collected: 09/13/22 2152     Updated: 09/13/22 2156    Narrative:      DATE OF EXAM: 9/13/2022 9:01 PM     PROCEDURE: XR CHEST 1 VW-     INDICATIONS: Chest Pain Triage Protocol.      COMPARISON: Chest x-ray 9/8/2022     TECHNIQUE: Single frontal view of the chest.     FINDINGS:  Normal cardiomediastinal silhouette. The lungs are clear without focal  consolidation. No pleural effusion or pneumothorax. No acute osseous  findings.       Impression:      No acute cardiopulmonary findings.     This report was finalized on 9/13/2022 9:52 PM by Marlo Bowling MD.               Tele:  NSR        ASSESSMENT:  -  51 yo CM with known CAD(LAD stent) presents to [facility] ED x 2 within a week with chest pain.  Serial troponins negative, EKG shows no acute findings.  -  Also has history of GERD and report of esophageal spasms although recent evaluation is WNL per patient report.  -  Essential Hypertension  -  HLP      PLAN:  -  Serial troponins drawn and are negative.  -  Received nuclear report from our office.  No evidence of ischemia, normal EF.  Last catheterization 2015 shows widely patent stent with no other coronary disease.  At this time, would find another source for patient's pain.  Will order echo and CT Chest.  -  DC Nitro paste.  -  BP marginal, HR too low for CCB.  -  Continue ASA 81mg daily, Crestor 40mg QHS  -  Continue GI meds and follow up accordingly.        I discussed the patient's findings and my  recommendations with the patient, any present family members, and the nursing staff.  Priyank Kam MD saw and examined patient, verified hx and PE, read all radiographic studies, reviewed labs and micro data, and formulated dx, plan for treatment and all medical decision making.      Ayesha Mohr PA-C, working with Priyank Kam MD  09/14/22 07:56 EDT

## 2022-09-14 NOTE — PROGRESS NOTES
[facility] Medicine Services  PROGRESS NOTE    Patient Name: Geovanny Barnett  : 1970  MRN: [id number]    Date of Admission: 2022  Primary Care Physician: Champ Feng MD    Subjective   Subjective     CC:  Chest pain    HPI:  Chest pain mildly present left upper chest. No dyspnea. No n/v/d. Not exacerbated by movements    ROS:  No f/c  No dyspnea or cough    Objective   Objective     Vital Signs:   Temp:  [97.4 °F (36.3 °C)-98.5 °F (36.9 °C)] 97.4 °F (36.3 °C)  Heart Rate:  [55-62] 55  Resp:  [18] 18  BP: (100-139)/() 100/71     Physical Exam:  Constitutional:Alert, oriented x 3, nontoxic appearing  Psych:Normal/appropriate affect  HEENT:NCAT, oropharynx clear  Neck: neck supple, full range of motion  Neuro: Face symmetric, speech clear, equal , moves all extremities  Cardiac: RRR; No pretibial pitting edema  Resp: CTAB, normal effort  GI: abd soft, nontender, obese  Skin: No extremity rash  Musculoskeletal/extremities: no cyanosis of extremities; no significant ankle edema      Results Reviewed:  LAB RESULTS:      Lab 22  0649 229 22  1452   WBC 7.62  --  10.95* 8.15   HEMOGLOBIN 14.3  --  15.2 15.7   HEMATOCRIT 42.3  --  44.1 45.8   PLATELETS 199  --  210 219   NEUTROS ABS 3.96  --  6.54 5.14   IMMATURE GRANS (ABS) 0.02  --  0.02 0.03   LYMPHS ABS 3.00  --  3.59* 2.52   MONOS ABS 0.50  --  0.63 0.34   EOS ABS 0.11  --  0.13 0.08   MCV 91.6  --  90.6 90.2   PROCALCITONIN  --  0.04  --   --    LACTATE  --  0.8  --   --          Lab 22  0649 22  1452   SODIUM 139 140 141   POTASSIUM 4.0 3.8 4.4   CHLORIDE 103 104 105   CO2 24.0 23.0 26.0   ANION GAP 12.0 13.0 10.0   BUN 17 17 14   CREATININE 1.01 1.18 1.19   EGFR 88.4 73.3 72.6   GLUCOSE 102* 91 111*   CALCIUM 9.5 9.6 10.2   HEMOGLOBIN A1C 6.30*  --   --          Lab 22  1452   TOTAL PROTEIN 7.5 8.0   ALBUMIN 4.70 4.90    GLOBULIN 2.8 3.1   ALT (SGPT) 34 43*   AST (SGOT) 27 33   BILIRUBIN 0.5 0.6   ALK PHOS 78 79   LIPASE 35 31         Lab 09/14/22  0029 09/13/22  2231 09/13/22  1959 09/08/22  1705 09/08/22  1452   PROBNP  --   --  27.1  --  32.2   TROPONIN T <0.010 <0.010 <0.010 <0.010 <0.010         Lab 09/14/22  0649   CHOLESTEROL 115   LDL CHOL 46   HDL CHOL 33*   TRIGLYCERIDES 226*             Brief Urine Lab Results  (Last result in the past 365 days)      Color   Clarity   Blood   Leuk Est   Nitrite   Protein   CREAT   Urine HCG        09/13/22 2355 Yellow   Clear   Negative   Negative   Negative   30 mg/dL (1+)                 Microbiology Results Abnormal     None          XR Chest 1 View    Result Date: 9/13/2022  DATE OF EXAM: 9/13/2022 9:01 PM  PROCEDURE: XR CHEST 1 VW-  INDICATIONS: Chest Pain Triage Protocol.  COMPARISON: Chest x-ray 9/8/2022  TECHNIQUE: Single frontal view of the chest.  FINDINGS: Normal cardiomediastinal silhouette. The lungs are clear without focal consolidation. No pleural effusion or pneumothorax. No acute osseous findings.      Impression: No acute cardiopulmonary findings.  This report was finalized on 9/13/2022 9:52 PM by Marlo Bowling MD.      CT Angiogram Chest    Result Date: 9/14/2022  CT ANGIOGRAM CHEST-  Date of Exam: 9/14/2022 9:04 AM  Indication: chest pain, rule out PE/acute process; R07.9-Chest pain, unspecified; R11.0-Nausea.  Comparison: CT chest with contrast 02/20/2021. AP portable chest 09/13/2022. CT abdomen and pelvis without contrast 12/22/2021.  Technique: Serial and axial CT images of the chest were obtained following the uneventful intravenous administration of 80 cc Isovue-370 contrast. Reconstructions in the coronal and sagittal planes were also performed. In addition, a 3 D volume rendered image was obtained after post processing. Automated exposure control and iterative reconstruction methods were used.  FINDINGS:  There is no pulmonary embolism. Heart size is  normal. Dense calcification versus stent is seen in the left anterior descending coronary artery. There is no pericardial effusion or pleural effusion. No pathologically enlarged lymph nodes are identified. The thoracic aortic caliber is within normal limits, and there is no indication of aortic dissection.  A 10 mm spiculated nodular density is redemonstrated within the left upper lobe (series 5 image 42). It is a stable finding since a PET/CT from 12/05/2019 where demonstrated no abnormal FDG uptake, and is in keeping with a benign finding.. No new pulmonary nodules are seen. There is no acute airspace disease.  There is mild scarring at the posterior margin of the left mid kidney, incompletely imaged. The liver appears mildly steatotic. The remainder the imaged upper abdominal organs are within normal limits.  No acute or suspicious osseous abnormalities are identified.       Impression:  1. No pulmonary embolism. 2. No acute chest finding. 3. 10 mm left upper lobe nodule is stable since 2019, an demonstrated no abnormal metabolic activity on prior PET/CT, and is in keeping with benign finding. 4. Mild hepatic steatosis. 5. Left renal cortical scarring, unchanged from CT abdomen 12/22/2021.    This report was finalized on 9/14/2022 9:32 AM by Marti Chapman MD.            I have reviewed the medications:  Scheduled Meds:aspirin, 81 mg, Oral, Daily  famotidine, 20 mg, Oral, BID  pantoprazole, 40 mg, Oral, Q AM  rosuvastatin, 40 mg, Oral, Daily  sodium chloride, 10 mL, Intravenous, Q12H      Continuous Infusions:   PRN Meds:.•  acetaminophen **OR** acetaminophen **OR** acetaminophen  •  nitroglycerin  •  ondansetron **OR** ondansetron  •  sodium chloride  •  sodium chloride    Assessment & Plan   Assessment & Plan     Active Hospital Problems    Diagnosis  POA   • **Chest pain [R07.9]  Yes   • GERD (gastroesophageal reflux disease) [K21.9]  Yes   • Hyperlipemia [E78.5]  Yes   • CAD (coronary artery disease)  [I25.10]  Yes   • Essential hypertension [I10]  Yes   • Solitary kidney, congenital [Q60.0]  Not Applicable      Resolved Hospital Problems   No resolved problems to display.        Brief Hospital Course to date:  Geovanny Barnett is a 52 y.o. male w/ hx cad (remote lad stent), obesity, former smoker. Last heart cath 2015 w/ patent lad stent & o/w was normal. Has recurrent atypical chest pain, worsened after eating spicy foods, not reliably worse w/ exertion. Has seen Dr. Gasca recently (8/2022) and had extensive workup negative for reflux, mild delayed gastric emptying. Dr. Gasca e-scribed nifedipine for possible esophageal spasms, patient hasn't picked up yet. Had cardiac pet stress by Dr. Kelly 9/13/22 which was negative. Presented back to ED later same evening for chest pain. No dyspnea    Atypical Chest pain  CAD (s/p PCI this admission)  HL  -had previous [facility] 2015 at which time LAD stent was patent & o/w negative  -PET stress negative on 9/13 (the morning prior to coming to ED), followed by Dr. Kelly & Negin  -troponins negative x 3; ekg's unchanged  -CT angio chest 9/14/22: no PE, no acute processes  -s/p C today, s/p stenting (unclear of details of the procedure as procedure report pending, but did have omer placed); Echo pending; I discussed w/ Dr. Kam this afternoon, wishes to keep overnight and plan tentatively on discharge tomorrow  -Symptoms due suggest possible gi component; however patient is s/p extensive negative gi workup by Dr. Gasca recently as outpatient (slightly delayed gastric emptying, ph probe negative so not reflux); I did discuss reinaldo/ Dr. Gasca 9/14/22 via phone, suspected possible esophageal spasm, so outpatient script sent in for nifedipine (patient hasn't picked up yet)     Pre-DM (a1c 6.3)  -follow up pcp; diet modifications    Expected Discharge Location and Transportation: home  Expected Discharge Date: 9/15/22 (if ok w/ Dr. Kam)    DVT prophylaxis:  Mechanical DVT  prophylaxis orders are present.     AM-PAC 6 Clicks Score (PT): 24 (09/13/22 8009)    CODE STATUS:   Code Status and Medical Interventions:   Ordered at: 09/13/22 2231     Level Of Support Discussed With:    Patient     Code Status (Patient has no pulse and is not breathing):    CPR (Attempt to Resuscitate)     Medical Interventions (Patient has pulse or is breathing):    Full Support       Zion Valenzuela MD  09/14/22

## 2022-09-14 NOTE — ED PROVIDER NOTES
[place]    EMERGENCY DEPARTMENT ENCOUNTER      Pt Name: Geovanny Barnett  MRN: [id number]  YOB: 1970  Date of evaluation: 9/13/2022  Provider: FILIPE Vegas    CHIEF COMPLAINT       Chief Complaint   Patient presents with   • Chest Pain         HISTORY OF PRESENT ILLNESS  (Location/Symptom, Timing/Onset, Context/Setting, Quality, Duration, Modifying Factors, Severity.)   Geovanny Barnett is a 52 y.o. male who presents to the emergency department with complaints of chest pain.  Patient reports that he was seen and evaluated at this facility Friday night for similar chest pain symptoms.  He states that the symptoms came back again today and were worse this evening.  Patient follows with Dr. Kelly with cardiology and had a stress test this morning which he should find out the results of tomorrow.  He reports this evening a tightness in his chest that he is very concerned about may be related to his heart.  He has past medical history of hyperlipidemia, myocardial infarction with stent placement, GERD and gout.  He reports taking aspirin daily.  He shares that he has had several studies done recently as his gastroenterologist believes that he was experiencing esophageal spasms.  He shares that the reports of the recent studies have proven that he does not have esophageal spasms.  Patient reports that his symptoms have been ongoing for approximately 1 week intermittently.  He also reports having a headache lately.  She was associated symptom of nausea but denies vomiting.  No additional associated symptoms on exam.    HPI   Nursing notes were reviewed.    REVIEW OF SYSTEMS    (2-9 systems for level 4, 10 or more for level 5)   Review of Systems   Constitutional: Positive for activity change. Negative for chills and fever.   Respiratory: Positive for chest tightness. Negative for cough and shortness of breath.    Cardiovascular: Positive for chest pain. Negative for palpitations.   Gastrointestinal:  Positive for nausea. Negative for abdominal pain, constipation, diarrhea and vomiting.   Neurological: Positive for headaches.        All systems reviewed and negative except for those discussed in HPI.   PAST MEDICAL HISTORY     Past Medical History:   Diagnosis Date   • GERD (gastroesophageal reflux disease)    • Gout    • Hyperlipidemia    • Myocardial infarction (HCC)          SURGICAL HISTORY       Past Surgical History:   Procedure Laterality Date   • CORONARY ANGIOPLASTY WITH STENT PLACEMENT     • ESOPHAGEAL MOTILITY STUDY N/A 8/16/2022    Procedure: MANOMETRY;  Surgeon: Stanley Gasca MD;  Location:  [facility];  Service: Gastroenterology;  Laterality: N/A;  LES, per screen read out at 42.6 Patient tolerated esophageal manometry well and probe was advanced to 37cm   • GASTRIC PH MONITORING 24HR N/A 8/23/2022    Procedure: GASTRIC PH MONITORING 24HR;  Surgeon: Stanley Gasca MD;  Location:  [facility];  Service: General;  Laterality: N/A;  pH impedance probe dropped to 3.5cm  Les from manometry testing registered at 42.6cm   • HIP SURGERY Bilateral          CURRENT MEDICATIONS       Current Facility-Administered Medications:   •  acetaminophen (TYLENOL) tablet 650 mg, 650 mg, Oral, Q4H PRN **OR** acetaminophen (TYLENOL) 160 MG/5ML solution 650 mg, 650 mg, Oral, Q4H PRN **OR** acetaminophen (TYLENOL) suppository 650 mg, 650 mg, Rectal, Q4H PRN, Nahum, Sharri, APRN  •  aspirin EC tablet 81 mg, 81 mg, Oral, Daily, Nahum, Sharri, APRN  •  famotidine (PEPCID) tablet 20 mg, 20 mg, Oral, BID, Nahum, Sharri, APRN, 20 mg at 09/14/22 0034  •  nitroglycerin (NITROSTAT) SL tablet 0.4 mg, 0.4 mg, Sublingual, Q5 Min PRN, Nahum, Sharri, APRN  •  ondansetron (ZOFRAN) tablet 4 mg, 4 mg, Oral, Q6H PRN **OR** ondansetron (ZOFRAN) injection 4 mg, 4 mg, Intravenous, Q6H PRN, Nahum, Sharri, APRN  •  pantoprazole (PROTONIX) EC tablet 40 mg, 40 mg, Oral, Q AM, Nahum, Sharri, APRN  •  rosuvastatin  "(CRESTOR) tablet 40 mg, 40 mg, Oral, Daily, Nahum, Sharri, APRN  •  sodium chloride 0.9 % flush 10 mL, 10 mL, Intravenous, PRN, Charles Moore MD  •  sodium chloride 0.9 % flush 10 mL, 10 mL, Intravenous, Q12H, Nahum, Sharri, APRN, 10 mL at 22 0035  •  sodium chloride 0.9 % flush 10 mL, 10 mL, Intravenous, PRN, Nahum, Sharri, APRN    ALLERGIES     Patient has no known allergies.    FAMILY HISTORY       Family History   Problem Relation Age of Onset   • Hypertension Mother    • Diabetes Mother    • Cancer Mother    • Cancer Father    • Heart valve disorder Father           SOCIAL HISTORY       Social History     Socioeconomic History   • Marital status: Single   • Number of children: 0   Tobacco Use   • Smoking status: Former Smoker     Packs/day: 0.25     Years: 19.00     Pack years: 4.75     Quit date:      Years since quittin.7   • Smokeless tobacco: Never Used   Vaping Use   • Vaping Use: Never used   Substance and Sexual Activity   • Alcohol use: Yes     Alcohol/week: 12.0 standard drinks     Types: 12 Cans of beer per week     Comment: \"6 beers every two weeks\"   • Drug use: No   • Sexual activity: Defer         PHYSICAL EXAM    (up to 7 for level 4, 8 or more for level 5)   Physical Exam  Vitals and nursing note reviewed.   Constitutional:       General: He is not in acute distress.     Appearance: Normal appearance. He is well-developed. He is not ill-appearing, toxic-appearing or diaphoretic.   HENT:      Head: Normocephalic and atraumatic.      Nose: Nose normal.      Mouth/Throat:      Mouth: Mucous membranes are moist.   Eyes:      Extraocular Movements: Extraocular movements intact.   Cardiovascular:      Rate and Rhythm: Normal rate and regular rhythm.      Pulses: Normal pulses.      Heart sounds: Normal heart sounds.   Pulmonary:      Effort: Pulmonary effort is normal. No respiratory distress.      Breath sounds: Normal breath sounds.   Chest:      Chest wall: No " tenderness.   Abdominal:      General: There is no distension.      Palpations: Abdomen is soft.   Musculoskeletal:         General: No deformity. Normal range of motion.      Cervical back: Normal range of motion.   Skin:     General: Skin is warm and dry.   Neurological:      General: No focal deficit present.      Mental Status: He is alert.   Psychiatric:         Mood and Affect: Mood is anxious.         Behavior: Behavior normal.         Thought Content: Thought content normal.         Judgment: Judgment normal.          DIAGNOSTIC RESULTS     EKG: All EKGs are interpreted by the Emergency Department Physician who either signs or Co-signs this chart in the absence of a cardiologist.    ECG 12 Lead   Preliminary Result   Test Reason : chest pain   Blood Pressure :   */*   mmHG   Vent. Rate :  57 BPM     Atrial Rate :  57 BPM      P-R Int : 166 ms          QRS Dur :  84 ms       QT Int : 416 ms       P-R-T Axes :  11  -5  23 degrees      QTc Int : 404 ms      Sinus bradycardia with sinus arrhythmia   Otherwise normal ECG   When compared with ECG of 13-SEP-2022 19:55, (Unconfirmed)   Questionable change in QRS axis   Nonspecific T wave abnormality no longer evident in Anterior leads      Referred By: EDMD           Confirmed By:       ECG 12 Lead   Preliminary Result   Test Reason : chest pain   Blood Pressure :   */*   mmHG   Vent. Rate :  61 BPM     Atrial Rate :  61 BPM      P-R Int : 182 ms          QRS Dur :  88 ms       QT Int : 388 ms       P-R-T Axes :  30  57  -1 degrees      QTc Int : 390 ms      Normal sinus rhythm   Normal ECG   When compared with ECG of 08-SEP-2022 17:09,   Criteria for Inferior infarct are no longer present      Referred By:            Confirmed By:           RADIOLOGY:   Non-plain film images such as CT, Ultrasound and MRI are read by the radiologist. Plain radiographic images are visualized and preliminarily interpreted by the emergency physician with the below findings:      [x]  Radiologist's Report Reviewed:  XR Chest 1 View   Final Result   No acute cardiopulmonary findings.       This report was finalized on 9/13/2022 9:52 PM by Marlo Bowling MD.                ED BEDSIDE ULTRASOUND:   Performed by ED Physician - none    LABS:    I have reviewed and interpreted all of the currently available lab results from this visit (if applicable):  Results for orders placed or performed during the hospital encounter of 09/13/22   Troponin    Specimen: Blood   Result Value Ref Range    Troponin T <0.010 0.000 - 0.030 ng/mL   Troponin    Specimen: Blood   Result Value Ref Range    Troponin T <0.010 0.000 - 0.030 ng/mL   Comprehensive Metabolic Panel    Specimen: Blood   Result Value Ref Range    Glucose 91 65 - 99 mg/dL    BUN 17 6 - 20 mg/dL    Creatinine 1.18 0.76 - 1.27 mg/dL    Sodium 140 136 - 145 mmol/L    Potassium 3.8 3.5 - 5.2 mmol/L    Chloride 104 98 - 107 mmol/L    CO2 23.0 22.0 - 29.0 mmol/L    Calcium 9.6 8.6 - 10.5 mg/dL    Total Protein 7.5 6.0 - 8.5 g/dL    Albumin 4.70 3.50 - 5.20 g/dL    ALT (SGPT) 34 1 - 41 U/L    AST (SGOT) 27 1 - 40 U/L    Alkaline Phosphatase 78 39 - 117 U/L    Total Bilirubin 0.5 0.0 - 1.2 mg/dL    Globulin 2.8 gm/dL    A/G Ratio 1.7 g/dL    BUN/Creatinine Ratio 14.4 7.0 - 25.0    Anion Gap 13.0 5.0 - 15.0 mmol/L    eGFR 73.3 >60.0 mL/min/1.73   Lipase    Specimen: Blood   Result Value Ref Range    Lipase 35 13 - 60 U/L   BNP    Specimen: Blood   Result Value Ref Range    proBNP 27.1 0.0 - 900.0 pg/mL   CBC Auto Differential    Specimen: Blood   Result Value Ref Range    WBC 10.95 (H) 3.40 - 10.80 10*3/mm3    RBC 4.87 4.14 - 5.80 10*6/mm3    Hemoglobin 15.2 13.0 - 17.7 g/dL    Hematocrit 44.1 37.5 - 51.0 %    MCV 90.6 79.0 - 97.0 fL    MCH 31.2 26.6 - 33.0 pg    MCHC 34.5 31.5 - 35.7 g/dL    RDW 13.2 12.3 - 15.4 %    RDW-SD 43.2 37.0 - 54.0 fl    MPV 10.4 6.0 - 12.0 fL    Platelets 210 140 - 450 10*3/mm3    Neutrophil % 59.6 42.7 - 76.0 %    Lymphocyte % 32.8  19.6 - 45.3 %    Monocyte % 5.8 5.0 - 12.0 %    Eosinophil % 1.2 0.3 - 6.2 %    Basophil % 0.4 0.0 - 1.5 %    Immature Grans % 0.2 0.0 - 0.5 %    Neutrophils, Absolute 6.54 1.70 - 7.00 10*3/mm3    Lymphocytes, Absolute 3.59 (H) 0.70 - 3.10 10*3/mm3    Monocytes, Absolute 0.63 0.10 - 0.90 10*3/mm3    Eosinophils, Absolute 0.13 0.00 - 0.40 10*3/mm3    Basophils, Absolute 0.04 0.00 - 0.20 10*3/mm3    Immature Grans, Absolute 0.02 0.00 - 0.05 10*3/mm3    nRBC 0.0 0.0 - 0.2 /100 WBC   Troponin    Specimen: Blood   Result Value Ref Range    Troponin T <0.010 0.000 - 0.030 ng/mL   Procalcitonin    Specimen: Blood   Result Value Ref Range    Procalcitonin 0.04 0.00 - 0.25 ng/mL   Lactic Acid, Plasma    Specimen: Blood   Result Value Ref Range    Lactate 0.8 0.5 - 2.0 mmol/L   Urinalysis With Culture If Indicated - Urine, Clean Catch    Specimen: Urine, Clean Catch   Result Value Ref Range    Color, UA Yellow Yellow, Straw    Appearance, UA Clear Clear    pH, UA 5.5 5.0 - 8.0    Specific Gravity, UA 1.027 1.001 - 1.030    Glucose, UA Negative Negative    Ketones, UA Negative Negative    Bilirubin, UA Negative Negative    Blood, UA Negative Negative    Protein, UA 30 mg/dL (1+) (A) Negative    Leuk Esterase, UA Negative Negative    Nitrite, UA Negative Negative    Urobilinogen, UA 1.0 E.U./dL 0.2 - 1.0 E.U./dL   Urinalysis, Microscopic Only - Urine, Clean Catch    Specimen: Urine, Clean Catch   Result Value Ref Range    RBC, UA 0-2 None Seen, 0-2 /HPF    WBC, UA None Seen None Seen, 0-2 /HPF    Bacteria, UA None Seen None Seen, Trace /HPF    Squamous Epithelial Cells, UA None Seen None Seen, 0-2 /HPF    Hyaline Casts, UA None Seen 0 - 6 /LPF    Methodology Automated Microscopy    ECG 12 Lead   Result Value Ref Range    QT Interval 388 ms    QTC Interval 390 ms   ECG 12 Lead   Result Value Ref Range    QT Interval 416 ms    QTC Interval 404 ms   Green Top (Gel)   Result Value Ref Range    Extra Tube Hold for add-ons.   "  Lavender Top   Result Value Ref Range    Extra Tube hold for add-on    Gold Top - SST   Result Value Ref Range    Extra Tube Hold for add-ons.    Gray Top   Result Value Ref Range    Extra Tube Hold for add-ons.    Light Blue Top   Result Value Ref Range    Extra Tube Hold for add-ons.         All other labs were within normal range or not returned as of this dictation.      EMERGENCY DEPARTMENT COURSE and DIFFERENTIAL DIAGNOSIS/MDM:   Vitals:    Vitals:    09/13/22 1952 09/13/22 2219 09/13/22 2320   BP: 127/100 132/90 139/91   BP Location:   Right arm   Patient Position:   Lying   Pulse: 62 58 58   Resp: 18 18 18   Temp: 98.5 °F (36.9 °C)  98.5 °F (36.9 °C)   TempSrc: Oral  Oral   SpO2: 97% 96% 95%   Weight: 90.7 kg (200 lb)  91.5 kg (201 lb 12.8 oz)   Height: 162.6 cm (64\")  165.1 cm (65\")       ED Course as of 09/14/22 0417   Wed Sep 14, 2022   0414 In summary this is a 52 year old male who presents with chest pain. History with high risk features including previous MI with stents. Stress test performed today. CAD risk factors. Exam without evidence of volume overload, BNP normal. EKG without signs of active ischemia. Initial and two hour troponin negative. Chest imaging demonstrates no acte acute cardiopulmonary process. HEART score: 4.  Hospitalist consulted for admission agreeable to accept patient.  Patient agreeable to plan of care and hospital admission for further evaluation and treatment of his chest pain.  At time of admission disposition patient resting comfortable, no acute distress, afebrile, nontoxic in appearance, vital signs stable and able to maintain oxygen saturation of 96% on room air.   [JG]      ED Course User Index  [JG] Jarrett Olson PA       MDM  Number of Diagnoses or Management Options  Nausea: new, needed workup  Nonspecific chest pain: new, needed workup     Amount and/or Complexity of Data Reviewed  Clinical lab tests: reviewed  Tests in the radiology section of CPT®: " reviewed  Discuss the patient with other providers: yes    Risk of Complications, Morbidity, and/or Mortality  Presenting problems: moderate  Diagnostic procedures: moderate  Management options: moderate    Patient Progress  Patient progress: stable         MEDICATIONS ADMINISTERED IN ED:  Medications   sodium chloride 0.9 % flush 10 mL (has no administration in time range)   aspirin EC tablet 81 mg (has no administration in time range)   famotidine (PEPCID) tablet 20 mg (20 mg Oral Given 9/14/22 0034)   pantoprazole (PROTONIX) EC tablet 40 mg (has no administration in time range)   rosuvastatin (CRESTOR) tablet 40 mg (has no administration in time range)   sodium chloride 0.9 % flush 10 mL (10 mL Intravenous Given 9/14/22 0035)   sodium chloride 0.9 % flush 10 mL (has no administration in time range)   acetaminophen (TYLENOL) tablet 650 mg (has no administration in time range)     Or   acetaminophen (TYLENOL) 160 MG/5ML solution 650 mg (has no administration in time range)     Or   acetaminophen (TYLENOL) suppository 650 mg (has no administration in time range)   nitroglycerin (NITROSTAT) SL tablet 0.4 mg (has no administration in time range)   ondansetron (ZOFRAN) tablet 4 mg (has no administration in time range)     Or   ondansetron (ZOFRAN) injection 4 mg (has no administration in time range)   aspirin chewable tablet 324 mg (324 mg Oral Given 9/13/22 2220)   aluminum-magnesium hydroxide-simethicone (MAALOX MAX) 400-400-40 MG/5ML suspension 15 mL (15 mL Oral Given 9/13/22 2221)   Lidocaine Viscous HCl (XYLOCAINE) 2 % solution 15 mL (15 mL Mouth/Throat Given 9/13/22 2221)   nitroglycerin (NITROSTAT) ointment 0.5 inch (0.5 inches Topical Given 9/13/22 2307)       PROCEDURES:  Procedures        HEART Score (for prediction of 6-week risk of major adverse cardiac event) reviewed and/or performed as part of the patient evaluation and treatment planning process.  The result associated with this review/performance is:  4       CRITICAL CARE TIME    Total Critical Care time was 0 minutes, excluding separately reportable procedures.   There was a high probability of clinically significant/life threatening deterioration in the patient's condition which required my urgent intervention.      FINAL IMPRESSION      1. Nonspecific chest pain    2. Nausea          DISPOSITION/PLAN     ED Disposition     ED Disposition   Decision to Admit    Condition   --    Comment   Level of Care: Telemetry [5]   Diagnosis: Chest pain [171052]   Admitting Physician: NUVIA EDEN [[id number]]   Attending Physician: NUVIA EDEN [[id number]]   Bed Request Comments: SSU                   Comment: Please note this report has been produced using speech recognition software.      FILIPE Vegas Jason C, PA  09/14/22 0417

## 2022-09-14 NOTE — PROGRESS NOTES
Geovanny Barnett  1970  S210/1          CT Chest is negative, echo pending.   At this time, we have discussed results with patient and he insists on pursuing cardiac catheterization.  Will have Dr. Rodriguez perform procedure.  Risks and benefits have been reviewed with patient and he is willing to proceed.  If catheterization is okay, patient can be discharged home.    /Ayesha Mohr, PA

## 2022-09-14 NOTE — TREATMENT PLAN
We have discussed the case with Dr. Kam and have agreed to proceed with with [facility] +/- [facility]. The risks, benefits, and alternatives were discussed with the patient and he wishes to proceed. Further recs to follow heart catheterization.     Allens Test:  Left: normal  Right: normal    Melani Castaneda PA-C

## 2022-09-14 NOTE — CASE MANAGEMENT/SOCIAL WORK
Discharge Planning Assessment  [facility]     Patient Name: Geovanny Barnett  MRN: [id number]  Today's Date: 9/14/2022    Admit Date: 9/13/2022     Discharge Needs Assessment    No documentation.                Discharge Plan     Row Name 09/14/22 1357       Plan    Plan Initial CM eval    Plan Comments Confirmed with patient that he lives in [facility] PCP is Champ Feng. He is indepedent of ADLs and denied the use of DME at home. Plan for heart cath today. Goal is to return home upon discharge - no dc needs identified at this time but CM will continue to follow -[phone]    Final Discharge Disposition Code 01 - home or self-care              Continued Care and Services - Admitted Since 9/13/2022    Coordination has not been started for this encounter.       Expected Discharge Date and Time     Expected Discharge Date Expected Discharge Time    Sep 15, 2022          Demographic Summary    No documentation.                Functional Status    No documentation.                Psychosocial    No documentation.                Abuse/Neglect    No documentation.                Legal    No documentation.                Substance Abuse    No documentation.                Patient Forms    No documentation.                   Jesse Andrews RN

## 2022-09-14 NOTE — H&P
"    [facility] Medicine Services  HISTORY AND PHYSICAL    Patient Name: Geovanny Barnett  : 1970  MRN: [id number]  Primary Care Physician: Champ Feng MD  Date of admission: 2022    Subjective   Subjective     Chief Complaint:  Chest pain    HPI:  Geovanny Barnett is a 52 y.o. male with a past medical history significant for gout, essential hypertension, hyperlipidemia, CAD and GERD presents to the ED with complaints of chest pain.  Patient reports over the past week intermittent chest pain that became more significant today.  He describes his chest pain as mid-sternal with associated nausea and shortness of air.   He reports feeling as if he has \"tightness around his esophagus\".  He reports symptoms with rest and exertion.  He notes when he eats his symptoms seem to be worse.  Patient did undergo stress test this morning but does not know the results yet. He does share that he has been following with GI due to possible esophageal spasms however he notes that his recent studies do not show that he has esophageal spasms.  Currently he rates his chest pain a 5/10.  He denies any recent fever, chills, vomiting, abdominal pain, cough, diarrhea or dysuria.  He does endorse a headache in addition to some dizziness.  Patient will be admitted to [facility] under the care of the Hospitalist for further evaluation and treatment.         Review of Systems   Constitutional: Positive for appetite change. Negative for activity change, chills, diaphoresis, fatigue, fever and unexpected weight change.   Eyes: Negative for photophobia and visual disturbance.   Respiratory: Positive for shortness of breath. Negative for cough.    Cardiovascular: Positive for chest pain. Negative for palpitations and leg swelling.   Gastrointestinal: Positive for nausea. Negative for abdominal distention, abdominal pain, blood in stool, constipation and vomiting.   Genitourinary: Negative.    Musculoskeletal: Negative " for neck pain and neck stiffness.   Skin: Negative.    Neurological: Positive for dizziness and headaches. Negative for seizures, syncope, speech difficulty, weakness and light-headedness.   Psychiatric/Behavioral: Negative.         All other systems reviewed and are negative.     Personal History     Past Medical History:   Diagnosis Date   • GERD (gastroesophageal reflux disease)    • Gout    • Hyperlipidemia    • Myocardial infarction (HCC)              Past Surgical History:   Procedure Laterality Date   • CORONARY ANGIOPLASTY WITH STENT PLACEMENT     • ESOPHAGEAL MOTILITY STUDY N/A 8/16/2022    Procedure: MANOMETRY;  Surgeon: Stanley Gasca MD;  Location:  [facility];  Service: Gastroenterology;  Laterality: N/A;  LES, per screen read out at 42.6 Patient tolerated esophageal manometry well and probe was advanced to 37cm   • GASTRIC PH MONITORING 24HR N/A 8/23/2022    Procedure: GASTRIC PH MONITORING 24HR;  Surgeon: Stanley Gasca MD;  Location:  [facility];  Service: General;  Laterality: N/A;  pH impedance probe dropped to 3.5cm  Les from manometry testing registered at 42.6cm   • HIP SURGERY Bilateral        Family History:  family history includes Cancer in his father and mother; Diabetes in his mother; Heart valve disorder in his father; Hypertension in his mother. Otherwise pertinent FHx was reviewed and unremarkable.     Social History:  reports that he quit smoking about 6 years ago. He has a 4.75 pack-year smoking history. He has never used smokeless tobacco. He reports current alcohol use of about 12.0 standard drinks of alcohol per week. He reports that he does not use drugs.  Social History     Social History Narrative    Lives in [facility].        Medications:  Allopurinol, aspirin, famotidine, omeprazole, pantoprazole, rosuvastatin, and sucralfate    No Known Allergies    Objective   Objective     Vital Signs:   Temp:  [98.5 °F (36.9 °C)] 98.5 °F (36.9 °C)  Heart Rate:   [62] 62  Resp:  [18] 18  BP: (127)/(100) 127/100    Physical Exam  Vitals and nursing note reviewed.   Constitutional:       General: He is not in acute distress.     Appearance: Normal appearance. He is normal weight. He is not ill-appearing, toxic-appearing or diaphoretic.   HENT:      Head: Normocephalic and atraumatic.      Nose: Nose normal.      Mouth/Throat:      Mouth: Mucous membranes are dry.      Pharynx: Oropharynx is clear.   Eyes:      Extraocular Movements: Extraocular movements intact.      Conjunctiva/sclera: Conjunctivae normal.      Pupils: Pupils are equal, round, and reactive to light.   Cardiovascular:      Rate and Rhythm: Normal rate and regular rhythm.      Pulses: Normal pulses.      Heart sounds: Normal heart sounds.   Pulmonary:      Effort: Pulmonary effort is normal.      Breath sounds: Normal breath sounds.   Abdominal:      General: Bowel sounds are normal. There is no distension.      Palpations: Abdomen is soft. There is no mass.      Tenderness: There is no abdominal tenderness. There is no right CVA tenderness, left CVA tenderness, guarding or rebound.      Hernia: No hernia is present.   Musculoskeletal:         General: No swelling, tenderness, deformity or signs of injury. Normal range of motion.      Cervical back: Normal range of motion and neck supple.      Right lower leg: No edema.      Left lower leg: No edema.   Skin:     General: Skin is warm and dry.   Neurological:      General: No focal deficit present.      Mental Status: He is alert and oriented to person, place, and time.   Psychiatric:         Mood and Affect: Mood normal.         Behavior: Behavior normal.         Thought Content: Thought content normal.         Judgment: Judgment normal.          Results Reviewed:  I have personally reviewed most recent indicated data and agree with findings including:  [x]  Laboratory  [x]  Radiology  [x]  EKG/Telemetry  []  Pathology  []  Cardiac/Vascular Studies  []  Old  records  []  Other:  Most pertinent findings include:      LAB RESULTS:      Lab 09/13/22 1959 09/08/22  1452   WBC 10.95* 8.15   HEMOGLOBIN 15.2 15.7   HEMATOCRIT 44.1 45.8   PLATELETS 210 219   NEUTROS ABS 6.54 5.14   IMMATURE GRANS (ABS) 0.02 0.03   LYMPHS ABS 3.59* 2.52   MONOS ABS 0.63 0.34   EOS ABS 0.13 0.08   MCV 90.6 90.2         Lab 09/13/22 1959 09/08/22  1452   SODIUM 140 141   POTASSIUM 3.8 4.4   CHLORIDE 104 105   CO2 23.0 26.0   ANION GAP 13.0 10.0   BUN 17 14   CREATININE 1.18 1.19   EGFR 73.3 72.6   GLUCOSE 91 111*   CALCIUM 9.6 10.2         Lab 09/13/22 1959 09/08/22  1452   TOTAL PROTEIN 7.5 8.0   ALBUMIN 4.70 4.90   GLOBULIN 2.8 3.1   ALT (SGPT) 34 43*   AST (SGOT) 27 33   BILIRUBIN 0.5 0.6   ALK PHOS 78 79   LIPASE 35 31         Lab 09/13/22 1959 09/08/22  1705 09/08/22  1452   PROBNP 27.1  --  32.2   TROPONIN T <0.010 <0.010 <0.010                 Brief Urine Lab Results  (Last result in the past 365 days)      Color   Clarity   Blood   Leuk Est   Nitrite   Protein   CREAT   Urine HCG        12/22/21 0427 Yellow   Clear   Trace   Negative   Negative   30 mg/dL (1+)               Microbiology Results (last 10 days)     ** No results found for the last 240 hours. **          No radiology results from the last 24 hrs        Assessment & Plan   Assessment & Plan       Chest pain    GERD (gastroesophageal reflux disease)    Hyperlipemia    CAD (coronary artery disease)    Essential hypertension      52 year old male presents to the ED with worsening chest pain over the past week.      Atypical Chest pain   Hx  CAD (prior remote stenting LAD)  HL  -last [facility] 2015 at which time LAD stent was patent & o/w negative  -PET stress negative 9/13 (the morning prior to coming to ED), followed by Dr. Kelly & Negin  -troponins negative x 3; ekg's unchanged  -CT angio chest 9/14/22: no PE, no acute processes  -Dr. Kam has seen, Echo pending; if that is normal, decision on LHC or not; continue asa &  statin  -symptoms suggest possible gi component; however s/p extensive negative gi workup by Dr. Gasca recently as outpatient (slightly delayed gastric emptying, ph probe negative so not reflux); I did discuss w/ Dr. Gasca 9/14/22 via phone, suspected possible esophageal spasm, so outpatient script sent in for nifedipine (patient hasn't picked up yet)    Pre-DM (a1c 6.3)  -follow up pcp; diet modifications       DVT prophylaxis: scds    CODE STATUS:  Full Code        This note has been completed as part of a split-shared workflow.     Signature: Electronically signed by IRVIN Ghosh, 09/13/22, 10:28 PM EDT.      Attending   Admission Attestation       I have performed an independent face-to-face diagnostic evaluation including performing an independent physical examination as documented here.  The documented plan of care above was reviewed and developed with the advanced practice clinician (APC).      Brief Summary Statement:   Geovanny Barnett is a 52 y.o. male w/ hx cad, obesity, former smoker. Last heart cath 2015 w/ patent lad stent & o/w was normal. Has recurrent atypical chest pain, worsened after eating spicy foods, not reliably worse w/ exertion. Has seen Dr. Gasca recently (8/2022) and had extensive workup negative for reflux, mild delayed gastric emptying. Dr. Gasca e-scribed nifedipine for possible esophageal spasms, patient hasn't picked up yet. Had cardiac pet stress by Dr. Kelly 9/13/22 which was negative. Presented back to ED later same evening for chest pain. No dyspnea    Remainder of detailed HPI is as noted by APC and has been reviewed and/or edited by me for completeness.    Attending Physical Exam:  Temp:  [97.4 °F (36.3 °C)-98.5 °F (36.9 °C)] 97.4 °F (36.3 °C)  Heart Rate:  [55-62] 55  Resp:  [18] 18  BP: (100-139)/() 100/71    Constitutional:Alert, oriented x 3, nontoxic appearing  Psych:Normal/appropriate affect  HEENT:NCAT, oropharynx clear  Neck: neck supple, full range of  motion  Neuro: Face symmetric, speech clear, equal , moves all extremities  Cardiac: RRR; No pretibial pitting edema  Resp: CTAB, normal effort  GI: abd soft, nontender, obese  Skin: No extremity rash  Musculoskeletal/extremities: no cyanosis of extremities; no significant ankle edema            Brief Assessment/Plan :  See detailed assessment and plan developed with APC which I have reviewed and/or edited for completeness.        Admission Status: I believe that this patient meets observation status      Zion Valenzuela MD  09/14/22

## 2022-09-14 NOTE — PLAN OF CARE
Goal Outcome Evaluation:      New admit for chest pain. Denies any chest pain upon arrival to unit. Does have Nitro paste present on chest that was placed in ED. VSS. Will continue to monitor.

## 2022-09-15 VITALS
WEIGHT: 201.8 LBS | HEIGHT: 65 IN | DIASTOLIC BLOOD PRESSURE: 74 MMHG | BODY MASS INDEX: 33.62 KG/M2 | RESPIRATION RATE: 20 BRPM | HEART RATE: 53 BPM | OXYGEN SATURATION: 94 % | SYSTOLIC BLOOD PRESSURE: 119 MMHG | TEMPERATURE: 98.1 F

## 2022-09-15 PROBLEM — I25.110 CORONARY ARTERY DISEASE INVOLVING NATIVE HEART WITH UNSTABLE ANGINA PECTORIS (HCC): Status: ACTIVE | Noted: 2022-09-13

## 2022-09-15 PROBLEM — R07.9 CHEST PAIN: Status: RESOLVED | Noted: 2022-09-13 | Resolved: 2022-09-15

## 2022-09-15 PROBLEM — R07.9 NONSPECIFIC CHEST PAIN: Status: ACTIVE | Noted: 2022-09-15

## 2022-09-15 PROBLEM — R07.9 NONSPECIFIC CHEST PAIN: Status: RESOLVED | Noted: 2022-09-15 | Resolved: 2022-09-15

## 2022-09-15 LAB
ANION GAP SERPL CALCULATED.3IONS-SCNC: 13 MMOL/L (ref 5–15)
BUN SERPL-MCNC: 15 MG/DL (ref 6–20)
BUN/CREAT SERPL: 13.4 (ref 7–25)
CALCIUM SPEC-SCNC: 9.7 MG/DL (ref 8.6–10.5)
CHLORIDE SERPL-SCNC: 103 MMOL/L (ref 98–107)
CO2 SERPL-SCNC: 23 MMOL/L (ref 22–29)
CREAT SERPL-MCNC: 1.12 MG/DL (ref 0.76–1.27)
EGFRCR SERPLBLD CKD-EPI 2021: 78.1 ML/MIN/1.73
GLUCOSE SERPL-MCNC: 96 MG/DL (ref 65–99)
POTASSIUM SERPL-SCNC: 4 MMOL/L (ref 3.5–5.2)
SODIUM SERPL-SCNC: 139 MMOL/L (ref 136–145)

## 2022-09-15 PROCEDURE — 80048 BASIC METABOLIC PNL TOTAL CA: CPT | Performed by: INTERNAL MEDICINE

## 2022-09-15 PROCEDURE — 99238 HOSP IP/OBS DSCHRG MGMT 30/<: CPT | Performed by: INTERNAL MEDICINE

## 2022-09-15 RX ORDER — PRASUGREL 10 MG/1
10 TABLET, FILM COATED ORAL DAILY
Status: DISCONTINUED | OUTPATIENT
Start: 2022-09-15 | End: 2022-09-15 | Stop reason: HOSPADM

## 2022-09-15 RX ORDER — PRASUGREL 10 MG/1
10 TABLET, FILM COATED ORAL DAILY
Qty: 90 TABLET | Refills: 3 | Status: SHIPPED | OUTPATIENT
Start: 2022-09-15

## 2022-09-15 RX ORDER — PRASUGREL 10 MG/1
10 TABLET, FILM COATED ORAL DAILY
Qty: 30 TABLET | Refills: 11 | Status: SHIPPED | OUTPATIENT
Start: 2022-09-15 | End: 2022-09-15 | Stop reason: HOSPADM

## 2022-09-15 RX ADMIN — PRASUGREL 10 MG: 10 TABLET, FILM COATED ORAL at 09:32

## 2022-09-15 RX ADMIN — PANTOPRAZOLE SODIUM 40 MG: 40 TABLET, DELAYED RELEASE ORAL at 06:25

## 2022-09-15 RX ADMIN — ROSUVASTATIN CALCIUM 40 MG: 20 TABLET, FILM COATED ORAL at 09:32

## 2022-09-15 RX ADMIN — ASPIRIN 81 MG: 81 TABLET, COATED ORAL at 09:32

## 2022-09-15 RX ADMIN — Medication 10 ML: at 09:39

## 2022-09-15 NOTE — CASE MANAGEMENT/SOCIAL WORK
Case Management Discharge Note      Final Note: Spoke w/pt in room. Plan is home. Has transportation. Denies d/c needs @ this time.         Selected Continued Care - Admitted Since 9/13/2022     Destination    No services have been selected for the patient.              Durable Medical Equipment    No services have been selected for the patient.              Dialysis/Infusion    No services have been selected for the patient.              Home Medical Care    No services have been selected for the patient.              Therapy    No services have been selected for the patient.              Community Resources    No services have been selected for the patient.              Community & DME    No services have been selected for the patient.                       Final Discharge Disposition Code: 01 - home or self-care

## 2022-09-15 NOTE — DISCHARGE SUMMARY
[facility] Medicine Services  DISCHARGE SUMMARY    Patient Name: Geovanny Barnett  : 1970  MRN: [id number]    Date of Admission: 2022  8:58 PM  Date of Discharge:  9/15/2022  Primary Care Physician: Champ Feng MD    Consults     Date and Time Order Name Status Description    2022 12:34 AM Inpatient Cardiology Consult Completed           Hospital Course     Presenting Problem:   Chest pain [R07.9]  Nonspecific chest pain [R07.9]    Active Hospital Problems    Diagnosis  POA   • **Coronary artery disease involving native heart with unstable angina pectoris (HCC) [I25.110]  Unknown     Priority: High   • GERD (gastroesophageal reflux disease) [K21.9]  Yes   • Hyperlipemia [E78.5]  Yes   • Essential hypertension [I10]  Yes   • Solitary kidney, congenital [Q60.0]  Not Applicable      Resolved Hospital Problems    Diagnosis Date Resolved POA   • Nonspecific chest pain [R07.9] 09/15/2022 Yes   • Chest pain [R07.9] 09/15/2022 Yes          Hospital Course:  Geovanny Barnett is a 52 y.o. male w/ hx cad, obesity, former smoker. He has had recurrent chest pain.  Workup for GI problems negative (Dr Gasca).  Cardiac PET stress on  was negative.  However, he continued to have chest pain nad came to ED.    Troponins remained negative but pain persisted.  Left heart cath on  showed 90% stenosis of proximal LAD.  JUS was placed.  EF was 85%.  Effient is added to his regimen.      Discharge Follow Up Recommendations for outpatient labs/diagnostics:   *FU with Dr Kam in one month    Day of Discharge     HPI:  No chest pain. Eager for DC.     Review of Systems  Gen- No fevers, chills  CV- No chest pain, palpitations  Resp- No cough, dyspnea  GI- No N/V/D, abd pain  '    Vital Signs:   Temp:  [98.1 °F (36.7 °C)-98.2 °F (36.8 °C)] 98.1 °F (36.7 °C)  Heart Rate:  [51-65] 53  Resp:  [20] 20  BP: (106-122)/(64-87) 119/74      Physical Exam:  Constitutional: Awake, alert  Eyes:  PER, no conjunctival injection  HENT: NCAT, mucous membranes moist  Neck: Supple, trachea midline  Respiratory: Clear to auscultation bilaterally, nonlabored respirations   Cardiovascular: RRR, no murmurs, rubs, or gallops, palpable pedal pulses bilaterally  Gastrointestinal: Positive bowel sounds, soft, nontender, nondistended  Musculoskeletal: No bilateral ankle edema, no clubbing or cyanosis to extremities  Psychiatric: Appropriate affect, cooperative  Neurologic: Oriented x 3, strength symmetric in all extremities, Cranial Nerves grossly intact to confrontation, speech clear  Skin: No rashes      Pertinent  and/or Most Recent Results     LAB RESULTS:      Lab 09/14/22  0649 09/14/22  0029 09/13/22 1959   WBC 7.62  --  10.95*   HEMOGLOBIN 14.3  --  15.2   HEMATOCRIT 42.3  --  44.1   PLATELETS 199  --  210   NEUTROS ABS 3.96  --  6.54   IMMATURE GRANS (ABS) 0.02  --  0.02   LYMPHS ABS 3.00  --  3.59*   MONOS ABS 0.50  --  0.63   EOS ABS 0.11  --  0.13   MCV 91.6  --  90.6   PROCALCITONIN  --  0.04  --    LACTATE  --  0.8  --          Lab 09/15/22  0550 09/14/22  0649 09/13/22 1959   SODIUM 139 139 140   POTASSIUM 4.0 4.0 3.8   CHLORIDE 103 103 104   CO2 23.0 24.0 23.0   ANION GAP 13.0 12.0 13.0   BUN 15 17 17   CREATININE 1.12 1.01 1.18   EGFR 78.1 88.4 73.3   GLUCOSE 96 102* 91   CALCIUM 9.7 9.5 9.6   HEMOGLOBIN A1C  --  6.30*  --          Lab 09/13/22 1959   TOTAL PROTEIN 7.5   ALBUMIN 4.70   GLOBULIN 2.8   ALT (SGPT) 34   AST (SGOT) 27   BILIRUBIN 0.5   ALK PHOS 78   LIPASE 35         Lab 09/14/22  0029 09/13/22 2231 09/13/22 1959   PROBNP  --   --  27.1   TROPONIN T <0.010 <0.010 <0.010         Lab 09/14/22 0649   CHOLESTEROL 115   LDL CHOL 46   HDL CHOL 33*   TRIGLYCERIDES 226*             Brief Urine Lab Results  (Last result in the past 365 days)      Color   Clarity   Blood   Leuk Est   Nitrite   Protein   CREAT   Urine HCG        09/13/22 2355 Yellow   Clear   Negative   Negative   Negative   30  mg/dL (1+)               Microbiology Results (last 10 days)     ** No results found for the last 240 hours. **          Adult Transthoracic Echo Complete W/ Cont if Necessary Per Protocol    Result Date: 9/14/2022  · Left ventricular ejection fraction appears to be 51 - 55%. · Estimated right ventricular systolic pressure from tricuspid regurgitation is normal (<35 mmHg). Calculated right ventricular systolic pressure from tricuspid regurgitation is 20 mmHg.      Cardiac Catheterization/Vascular Study    Result Date: 9/14/2022  FINAL     · 90% stenosis of the proximal LAD successfully stented with 4.0 x 23 mm JUS and reduced to 0%. · No significant disease of the left main, the large ramus intermedius, the circumflex of the RCA. · Normal left ventricular systolic function, estimated EF 65%. RECOMMENDATIONS: · Continue guideline based medical therapy and risk factor management. · Dual antiplatelet therapy for at least 1 year. Indications: Unstable angina/CAD  Access: Right radial. Procedures: · Left heart catheterization. · Left ventriculogram. · Selective coronary angiography. · PTCA/stenting of the LAD. · Arterial site hemostasis with radial band. Procedure narrative: The patient was brought to the catheterization lab in a fasting condition.  Access site was prepped and draped in standard sterile fashion.  Lidocaine was injected and arterial access was obtained by percutaneous anterior wall puncture technique.  A 6 German arterial sheath was placed. Above procedures were performed without complications.  Following the angiograms additional heparin was given.  The left coronary artery was engaged with CLS 3 guide catheter.  The lesion in the LAD was crossed with a luge wire and predilated with a 2.5 mm balloon.  Subsequently it was stented with a 4.0 x 23 mm JUS which was fully deployed and then postdilated with a 4.0 mm NC balloon at high pressures with excellent stent expansion and 0% residual stenosis and no  evidence of complications.  Nitroglycerin was injected, final angiograms were taken and the guide catheter was removed.  MELQUIADES flow was grade 3 before and after the intervention.  At the conclusion the arterial sheath was removed and hemostasis was achieved.  The patient was transferred to the unit in a stable condition. Hemodynamic Findings: Heart Rate: 79/minute. LV pressure: 135/6-13 mmHg, on pull back no gradient was recorded across the aortic valve. Angiographic Findings: Right coronary dominance. · LM: Angiographically normal. · LAD: 90% proximal stenosis at the proximal edge of the previously placed mid vessel stent.  The stent is patent with mild, 10 to 20% plaque.  The distal vessel has minor irregularities.  The LAD was successfully stented with a 4.0 x 23 mm JUS and the 90% stenosis was reduced to 0%. · LCX: Minor irregularities and mild plaque without hemodynamically significant disease. · Ramus intermedius: Large vessel with mild, 20% scattered plaque without hemodynamically significant disease. · RCA: Tortuous, dominant vessel with minor irregularities, no significant disease.  Mild ectasia is also noted. · LV: Left ventriculogram performed in 30 BAILEY projection revealed normal global and regional left ventricular systolic function with estimated ejection fraction of 65%.  No mitral regurgitation was noted. Complications: No acute procedure related complications.     XR Chest 1 View    Result Date: 9/13/2022  DATE OF EXAM: 9/13/2022 9:01 PM  PROCEDURE: XR CHEST 1 VW-  INDICATIONS: Chest Pain Triage Protocol.  COMPARISON: Chest x-ray 9/8/2022  TECHNIQUE: Single frontal view of the chest.  FINDINGS: Normal cardiomediastinal silhouette. The lungs are clear without focal consolidation. No pleural effusion or pneumothorax. No acute osseous findings.      No acute cardiopulmonary findings.  This report was finalized on 9/13/2022 9:52 PM by Marlo Bowling MD.      XR Chest 1 View    Result Date:  9/8/2022  Examination: XR CHEST 1 VW-  Date of Exam: 9/8/2022 2:48 PM  Indication: Chest Pain Triage Protocol.  Comparison: 12/20/2021.  Technique: Single radiographic view of the chest was obtained.  Findings: There is no pneumothorax, pleural effusion or focal airspace consolidation. Cardiomediastinal silhouette is unremarkable. Pulmonary vasculature appears within normal limits. Regional bones appear grossly intact.      No acute cardiopulmonary abnormality.  This report was finalized on 9/8/2022 3:08 PM by Champ Villalpando MD.      CT Angiogram Chest    Result Date: 9/14/2022  CT ANGIOGRAM CHEST-  Date of Exam: 9/14/2022 9:04 AM  Indication: chest pain, rule out PE/acute process; R07.9-Chest pain, unspecified; R11.0-Nausea.  Comparison: CT chest with contrast 02/20/2021. AP portable chest 09/13/2022. CT abdomen and pelvis without contrast 12/22/2021.  Technique: Serial and axial CT images of the chest were obtained following the uneventful intravenous administration of 80 cc Isovue-370 contrast. Reconstructions in the coronal and sagittal planes were also performed. In addition, a 3 D volume rendered image was obtained after post processing. Automated exposure control and iterative reconstruction methods were used.  FINDINGS:  There is no pulmonary embolism. Heart size is normal. Dense calcification versus stent is seen in the left anterior descending coronary artery. There is no pericardial effusion or pleural effusion. No pathologically enlarged lymph nodes are identified. The thoracic aortic caliber is within normal limits, and there is no indication of aortic dissection.  A 10 mm spiculated nodular density is redemonstrated within the left upper lobe (series 5 image 42). It is a stable finding since a PET/CT from 12/05/2019 where demonstrated no abnormal FDG uptake, and is in keeping with a benign finding.. No new pulmonary nodules are seen. There is no acute airspace disease.  There is mild scarring at the  posterior margin of the left mid kidney, incompletely imaged. The liver appears mildly steatotic. The remainder the imaged upper abdominal organs are within normal limits.  No acute or suspicious osseous abnormalities are identified.        1. No pulmonary embolism. 2. No acute chest finding. 3. 10 mm left upper lobe nodule is stable since 2019, an demonstrated no abnormal metabolic activity on prior PET/CT, and is in keeping with benign finding. 4. Mild hepatic steatosis. 5. Left renal cortical scarring, unchanged from CT abdomen 12/22/2021.    This report was finalized on 9/14/2022 9:32 AM by Marti Chapman MD.                Results for orders placed during the hospital encounter of 09/13/22    Adult Transthoracic Echo Complete W/ Cont if Necessary Per Protocol    Interpretation Summary  · Left ventricular ejection fraction appears to be 51 - 55%.  · Estimated right ventricular systolic pressure from tricuspid regurgitation is normal (<35 mmHg). Calculated right ventricular systolic pressure from tricuspid regurgitation is 20 mmHg.    Discharge Details        Discharge Medications      New Medications      Instructions Start Date   nitroglycerin 0.4 MG SL tablet  Commonly known as: NITROSTAT   1 under the tongue as needed for angina, may repeat q5mins for up three doses      prasugrel 10 MG tablet  Commonly known as: EFFIENT   10 mg, Oral, Daily         Continue These Medications      Instructions Start Date   ALLOPURINOL PO   Oral      aspirin 81 MG EC tablet   81 mg, Oral, Daily      Crestor 40 MG tablet  Generic drug: rosuvastatin   40 mg, Oral, Daily      famotidine 20 MG tablet  Commonly known as: PEPCID   20 mg, Oral, 2 Times Daily      omeprazole 20 MG capsule  Commonly known as: priLOSEC   20 mg, Oral, Daily         Stop These Medications    sucralfate 1 g tablet  Commonly known as: CARAFATE            No Known Allergies      Discharge Disposition:  Home or Self Care    Diet:  Hospital:  No active diet  order      Activity: as tolerated         CODE STATUS:    Code Status and Medical Interventions:   Ordered at: 09/13/22 3211     Level Of Support Discussed With:    Patient     Code Status (Patient has no pulse and is not breathing):    CPR (Attempt to Resuscitate)     Medical Interventions (Patient has pulse or is breathing):    Full Support       No future appointments.    Additional Instructions for the Follow-ups that You Need to Schedule     Ambulatory Referral to Cardiac Rehab   As directed      Discharge Follow-up with Specialty: Dr Kelly; 1 Month   As directed      Specialty: Dr Kelly    Follow Up: 1 Month                     Lisa Shelby MD  09/15/22      Time Spent on Discharge:  I spent  30  minutes on this discharge activity which included: face-to-face encounter with the patient, reviewing the data in the system, coordination of the care with the nursing staff as well as consultants, documentation, and entering orders.

## 2022-09-15 NOTE — PROGRESS NOTES
Pt. Referred for Phase II Cardiac Rehab. Staff discussed benefits of exercise, program protocol, and educational material provided. Teach back verified. Patient refused cardiac rehab at this time. Staff gave at home exercise guidelines and benefits. Teach back verified.

## 2022-09-15 NOTE — PROGRESS NOTES
" [facility] - Progress Note    Geovanny Barnett  1970  [id number]    09/15/22, 07:51 EDT      Chief Complaint: Following for chest pain, CAD    Subjective:   S/P PCI LAD.  Feels much better, no chest pain.  Ready to go home.      Review of Systems:  Pertinent positives are listed above and in physical exam.  All others have been reviewed and are negative.    aspirin, 81 mg, Oral, Daily  famotidine, 20 mg, Oral, BID  pantoprazole, 40 mg, Oral, Q AM  rosuvastatin, 40 mg, Oral, Daily  sodium chloride, 10 mL, Intravenous, Q12H        Objective:  Vitals:   height is 165.1 cm (65\") and weight is 91.5 kg (201 lb 12.8 oz). His oral temperature is 98.1 °F (36.7 °C). His blood pressure is 119/74 and his pulse is 53. His respiration is 20 and oxygen saturation is 94%.       Intake/Output Summary (Last 24 hours) at 9/15/2022 0800  Last data filed at 9/14/2022 2000  Gross per 24 hour   Intake --   Output 750 ml   Net -750 ml       Physical Exam:  General:  WN, NAD, A and O x3.  CV:  Normal S1,S2. No murmur, rub, or gallop.  Resp:  CTA Jonatan, equal, nonlabored.  Abd:  Soft, + BS, no organomegaly. Nontender to palpation.  Extrem:  No edema BLE, 2+ pedal/PT pulses.    Right wrist stable.        Results from last 7 days   Lab Units 09/14/22  0649   WBC 10*3/mm3 7.62   HEMOGLOBIN g/dL 14.3   HEMATOCRIT % 42.3   PLATELETS 10*3/mm3 199     Results from last 7 days   Lab Units 09/15/22  0550 09/14/22  0649 09/13/22  1959   SODIUM mmol/L 139   < > 140   POTASSIUM mmol/L 4.0   < > 3.8   CHLORIDE mmol/L 103   < > 104   CO2 mmol/L 23.0   < > 23.0   BUN mg/dL 15   < > 17   CREATININE mg/dL 1.12   < > 1.18   CALCIUM mg/dL 9.7   < > 9.6   BILIRUBIN mg/dL  --   --  0.5   ALK PHOS U/L  --   --  78   ALT (SGPT) U/L  --   --  34   AST (SGOT) U/L  --   --  27   GLUCOSE mg/dL 96   < > 91    < > = values in this interval not displayed.             Results from last 7 days   Lab Units 09/14/22  0649   CHOLESTEROL mg/dL 115 "   TRIGLYCERIDES mg/dL 226*   HDL CHOL mg/dL 33*   LDL CHOL mg/dL 46     Results from last 7 days   Lab Units 09/13/22 1959   PROBNP pg/mL 27.1       Tele:  NSR    ASSESSMENT:  -  51 yo CM with known CAD(LAD stent) presents to [facility] ED x 2 within a week with chest pain.  Serial troponins negative, EKG shows no acute findings.  -  Also has history of GERD and report of esophageal spasms although recent evaluation is WNL per patient report.  -  Essential Hypertension  -  HLP        PLAN:  -  Serial troponins drawn and are negative.  -  Received nuclear report from our office.  No evidence of ischemia, normal EF.  Last catheterization 2015 shows widely patent stent with no other coronary disease.    -  With continued pain, proceeded with [facility].  S/P successful PCI to LAD.  -  BP marginal, HR too low for CCB or BB.  -  Continue ASA 81mg daily, Crestor 40mg QHS, Effient 10mg daily  -  Continue GI meds and follow up accordingly.  -  DC Home today in stable condition.  Follow up with  Dr. Kelly in 1 month.      I discussed the patient's findings and my recommendations with the patient, any present family members, and the nursing staff.  Priyank Alvarez MD saw and examined patient, verified hx and PE, read all radiographic studies, reviewed labs and micro data, and formulated dx, plan for treatment and all medical decision making.      Ayesha Mohr PA-C  09/15/22, 07:51 EDT

## 2022-09-16 ENCOUNTER — TELEPHONE (OUTPATIENT)
Dept: CARDIOLOGY | Facility: HOSPITAL | Age: 52
End: 2022-09-16

## 2022-09-16 LAB — ACT BLD: 190 SECONDS (ref 82–152)

## 2022-09-16 NOTE — TELEPHONE ENCOUNTER
Patient was referred to [facility] by the [facility]. Several attempts have been made to contact the patient with no success. Letter was mailed to patient to contact the office to schedule.

## 2022-09-18 LAB
QT INTERVAL: 428 MS
QTC INTERVAL: 423 MS

## 2022-09-25 LAB
QT INTERVAL: 388 MS
QT INTERVAL: 416 MS
QTC INTERVAL: 390 MS
QTC INTERVAL: 404 MS

## 2022-10-08 ENCOUNTER — HOSPITAL ENCOUNTER (OUTPATIENT)
Facility: HOSPITAL | Age: 52
Setting detail: OBSERVATION
Discharge: HOME | End: 2022-10-11
Attending: EMERGENCY MEDICINE | Admitting: HOSPITALIST

## 2022-10-08 ENCOUNTER — APPOINTMENT (OUTPATIENT)
Dept: GENERAL RADIOLOGY | Facility: HOSPITAL | Age: 52
End: 2022-10-08

## 2022-10-08 DIAGNOSIS — Z86.79 HISTORY OF CORONARY ARTERY DISEASE: ICD-10-CM

## 2022-10-08 DIAGNOSIS — Z87.19 HISTORY OF GASTROESOPHAGEAL REFLUX (GERD): ICD-10-CM

## 2022-10-08 DIAGNOSIS — R07.89 ATYPICAL CHEST PAIN: Primary | ICD-10-CM

## 2022-10-08 DIAGNOSIS — Z86.39 HISTORY OF HYPERLIPIDEMIA: ICD-10-CM

## 2022-10-08 DIAGNOSIS — R11.0 NAUSEA: ICD-10-CM

## 2022-10-08 DIAGNOSIS — F41.9 ANXIETY: ICD-10-CM

## 2022-10-08 DIAGNOSIS — R06.02 SHORTNESS OF BREATH: ICD-10-CM

## 2022-10-08 PROBLEM — R07.9 CHEST PAIN: Status: ACTIVE | Noted: 2022-10-08

## 2022-10-08 LAB
ALBUMIN SERPL-MCNC: 4.9 G/DL (ref 3.5–5.2)
ALBUMIN/GLOB SERPL: 1.6 G/DL
ALP SERPL-CCNC: 80 U/L (ref 39–117)
ALT SERPL W P-5'-P-CCNC: 38 U/L (ref 1–41)
ANION GAP SERPL CALCULATED.3IONS-SCNC: 11 MMOL/L (ref 5–15)
AST SERPL-CCNC: 29 U/L (ref 1–40)
BASOPHILS # BLD AUTO: 0.03 10*3/MM3 (ref 0–0.2)
BASOPHILS NFR BLD AUTO: 0.3 % (ref 0–1.5)
BILIRUB SERPL-MCNC: 0.6 MG/DL (ref 0–1.2)
BUN SERPL-MCNC: 16 MG/DL (ref 6–20)
BUN/CREAT SERPL: 13.6 (ref 7–25)
CALCIUM SPEC-SCNC: 9.7 MG/DL (ref 8.6–10.5)
CHLORIDE SERPL-SCNC: 104 MMOL/L (ref 98–107)
CO2 SERPL-SCNC: 24 MMOL/L (ref 22–29)
CREAT SERPL-MCNC: 1.18 MG/DL (ref 0.76–1.27)
DEPRECATED RDW RBC AUTO: 44.2 FL (ref 37–54)
EGFRCR SERPLBLD CKD-EPI 2021: 73.3 ML/MIN/1.73
EOSINOPHIL # BLD AUTO: 0.13 10*3/MM3 (ref 0–0.4)
EOSINOPHIL NFR BLD AUTO: 1.3 % (ref 0.3–6.2)
ERYTHROCYTE [DISTWIDTH] IN BLOOD BY AUTOMATED COUNT: 13.3 % (ref 12.3–15.4)
GLOBULIN UR ELPH-MCNC: 3.1 GM/DL
GLUCOSE SERPL-MCNC: 86 MG/DL (ref 65–99)
HCT VFR BLD AUTO: 44.7 % (ref 37.5–51)
HGB BLD-MCNC: 15.2 G/DL (ref 13–17.7)
HOLD SPECIMEN: NORMAL
IMM GRANULOCYTES # BLD AUTO: 0.02 10*3/MM3 (ref 0–0.05)
IMM GRANULOCYTES NFR BLD AUTO: 0.2 % (ref 0–0.5)
LIPASE SERPL-CCNC: 38 U/L (ref 13–60)
LYMPHOCYTES # BLD AUTO: 2.9 10*3/MM3 (ref 0.7–3.1)
LYMPHOCYTES NFR BLD AUTO: 30 % (ref 19.6–45.3)
MCH RBC QN AUTO: 30.8 PG (ref 26.6–33)
MCHC RBC AUTO-ENTMCNC: 34 G/DL (ref 31.5–35.7)
MCV RBC AUTO: 90.5 FL (ref 79–97)
MONOCYTES # BLD AUTO: 0.54 10*3/MM3 (ref 0.1–0.9)
MONOCYTES NFR BLD AUTO: 5.6 % (ref 5–12)
NEUTROPHILS NFR BLD AUTO: 6.06 10*3/MM3 (ref 1.7–7)
NEUTROPHILS NFR BLD AUTO: 62.6 % (ref 42.7–76)
NRBC BLD AUTO-RTO: 0 /100 WBC (ref 0–0.2)
NT-PROBNP SERPL-MCNC: 15.1 PG/ML (ref 0–900)
PLATELET # BLD AUTO: 221 10*3/MM3 (ref 140–450)
PMV BLD AUTO: 10.9 FL (ref 6–12)
POTASSIUM SERPL-SCNC: 4.1 MMOL/L (ref 3.5–5.2)
PROT SERPL-MCNC: 8 G/DL (ref 6–8.5)
QT INTERVAL: 428 MS
QT INTERVAL: 454 MS
QTC INTERVAL: 397 MS
QTC INTERVAL: 409 MS
RBC # BLD AUTO: 4.94 10*6/MM3 (ref 4.14–5.8)
SODIUM SERPL-SCNC: 139 MMOL/L (ref 136–145)
TROPONIN T SERPL-MCNC: <0.01 NG/ML (ref 0–0.03)
TROPONIN T SERPL-MCNC: <0.01 NG/ML (ref 0–0.03)
TSH SERPL DL<=0.05 MIU/L-ACNC: 1.55 UIU/ML (ref 0.27–4.2)
WBC NRBC COR # BLD: 9.68 10*3/MM3 (ref 3.4–10.8)
WHOLE BLOOD HOLD COAG: NORMAL
WHOLE BLOOD HOLD SPECIMEN: NORMAL

## 2022-10-08 PROCEDURE — G0378 HOSPITAL OBSERVATION PER HR: HCPCS

## 2022-10-08 PROCEDURE — 83690 ASSAY OF LIPASE: CPT | Performed by: EMERGENCY MEDICINE

## 2022-10-08 PROCEDURE — 96374 THER/PROPH/DIAG INJ IV PUSH: CPT

## 2022-10-08 PROCEDURE — 80053 COMPREHEN METABOLIC PANEL: CPT | Performed by: EMERGENCY MEDICINE

## 2022-10-08 PROCEDURE — 71045 X-RAY EXAM CHEST 1 VIEW: CPT

## 2022-10-08 PROCEDURE — 93005 ELECTROCARDIOGRAM TRACING: CPT | Performed by: EMERGENCY MEDICINE

## 2022-10-08 PROCEDURE — 96375 TX/PRO/DX INJ NEW DRUG ADDON: CPT

## 2022-10-08 PROCEDURE — 36415 COLL VENOUS BLD VENIPUNCTURE: CPT

## 2022-10-08 PROCEDURE — 84443 ASSAY THYROID STIM HORMONE: CPT | Performed by: EMERGENCY MEDICINE

## 2022-10-08 PROCEDURE — 84484 ASSAY OF TROPONIN QUANT: CPT | Performed by: EMERGENCY MEDICINE

## 2022-10-08 PROCEDURE — 83880 ASSAY OF NATRIURETIC PEPTIDE: CPT | Performed by: EMERGENCY MEDICINE

## 2022-10-08 PROCEDURE — 25010000002 LORAZEPAM PER 2 MG: Performed by: EMERGENCY MEDICINE

## 2022-10-08 PROCEDURE — 85025 COMPLETE CBC W/AUTO DIFF WBC: CPT | Performed by: EMERGENCY MEDICINE

## 2022-10-08 PROCEDURE — 99284 EMERGENCY DEPT VISIT MOD MDM: CPT

## 2022-10-08 PROCEDURE — 93005 ELECTROCARDIOGRAM TRACING: CPT

## 2022-10-08 RX ORDER — SODIUM CHLORIDE 0.9 % (FLUSH) 0.9 %
10 SYRINGE (ML) INJECTION AS NEEDED
Status: DISCONTINUED | OUTPATIENT
Start: 2022-10-08 | End: 2022-10-11 | Stop reason: HOSPADM

## 2022-10-08 RX ORDER — LORAZEPAM 2 MG/ML
0.5 INJECTION INTRAMUSCULAR ONCE
Status: COMPLETED | OUTPATIENT
Start: 2022-10-08 | End: 2022-10-08

## 2022-10-08 RX ORDER — ASPIRIN 81 MG/1
324 TABLET, CHEWABLE ORAL ONCE
Status: COMPLETED | OUTPATIENT
Start: 2022-10-08 | End: 2022-10-08

## 2022-10-08 RX ORDER — PANTOPRAZOLE SODIUM 40 MG/10ML
40 INJECTION, POWDER, LYOPHILIZED, FOR SOLUTION INTRAVENOUS ONCE
Status: COMPLETED | OUTPATIENT
Start: 2022-10-08 | End: 2022-10-08

## 2022-10-08 RX ADMIN — PANTOPRAZOLE SODIUM 40 MG: 40 INJECTION, POWDER, FOR SOLUTION INTRAVENOUS at 20:30

## 2022-10-08 RX ADMIN — ASPIRIN 81 MG CHEWABLE TABLET 324 MG: 81 TABLET CHEWABLE at 23:37

## 2022-10-08 RX ADMIN — LORAZEPAM 0.5 MG: 2 INJECTION INTRAMUSCULAR; INTRAVENOUS at 20:30

## 2022-10-09 ENCOUNTER — APPOINTMENT (OUTPATIENT)
Dept: CARDIOLOGY | Facility: HOSPITAL | Age: 52
End: 2022-10-09

## 2022-10-09 PROBLEM — Z87.898 HISTORY OF PREDIABETES: Status: ACTIVE | Noted: 2022-10-09

## 2022-10-09 PROBLEM — E86.0 DEHYDRATION: Status: ACTIVE | Noted: 2022-10-09

## 2022-10-09 PROBLEM — R00.1 SYMPTOMATIC BRADYCARDIA: Status: ACTIVE | Noted: 2022-10-09

## 2022-10-09 LAB
ANION GAP SERPL CALCULATED.3IONS-SCNC: 12 MMOL/L (ref 5–15)
BASOPHILS # BLD AUTO: 0.05 10*3/MM3 (ref 0–0.2)
BASOPHILS NFR BLD AUTO: 0.7 % (ref 0–1.5)
BUN SERPL-MCNC: 16 MG/DL (ref 6–20)
BUN/CREAT SERPL: 16 (ref 7–25)
CALCIUM SPEC-SCNC: 9.6 MG/DL (ref 8.6–10.5)
CHLORIDE SERPL-SCNC: 106 MMOL/L (ref 98–107)
CO2 SERPL-SCNC: 21 MMOL/L (ref 22–29)
CREAT SERPL-MCNC: 1 MG/DL (ref 0.76–1.27)
DEPRECATED RDW RBC AUTO: 43.4 FL (ref 37–54)
EGFRCR SERPLBLD CKD-EPI 2021: 89.4 ML/MIN/1.73
EOSINOPHIL # BLD AUTO: 0.14 10*3/MM3 (ref 0–0.4)
EOSINOPHIL NFR BLD AUTO: 1.8 % (ref 0.3–6.2)
ERYTHROCYTE [DISTWIDTH] IN BLOOD BY AUTOMATED COUNT: 13.2 % (ref 12.3–15.4)
GLUCOSE SERPL-MCNC: 113 MG/DL (ref 65–99)
HBA1C MFR BLD: 6.1 % (ref 4.8–5.6)
HCT VFR BLD AUTO: 42.9 % (ref 37.5–51)
HGB BLD-MCNC: 14.6 G/DL (ref 13–17.7)
IMM GRANULOCYTES # BLD AUTO: 0.02 10*3/MM3 (ref 0–0.05)
IMM GRANULOCYTES NFR BLD AUTO: 0.3 % (ref 0–0.5)
LYMPHOCYTES # BLD AUTO: 2.44 10*3/MM3 (ref 0.7–3.1)
LYMPHOCYTES NFR BLD AUTO: 31.8 % (ref 19.6–45.3)
MAGNESIUM SERPL-MCNC: 2 MG/DL (ref 1.6–2.6)
MCH RBC QN AUTO: 30.8 PG (ref 26.6–33)
MCHC RBC AUTO-ENTMCNC: 34 G/DL (ref 31.5–35.7)
MCV RBC AUTO: 90.5 FL (ref 79–97)
MONOCYTES # BLD AUTO: 0.49 10*3/MM3 (ref 0.1–0.9)
MONOCYTES NFR BLD AUTO: 6.4 % (ref 5–12)
NEUTROPHILS NFR BLD AUTO: 4.54 10*3/MM3 (ref 1.7–7)
NEUTROPHILS NFR BLD AUTO: 59 % (ref 42.7–76)
NRBC BLD AUTO-RTO: 0 /100 WBC (ref 0–0.2)
PLATELET # BLD AUTO: 185 10*3/MM3 (ref 140–450)
PMV BLD AUTO: 10.6 FL (ref 6–12)
POTASSIUM SERPL-SCNC: 3.9 MMOL/L (ref 3.5–5.2)
RBC # BLD AUTO: 4.74 10*6/MM3 (ref 4.14–5.8)
SODIUM SERPL-SCNC: 139 MMOL/L (ref 136–145)
TROPONIN T SERPL-MCNC: <0.01 NG/ML (ref 0–0.03)
WBC NRBC COR # BLD: 7.68 10*3/MM3 (ref 3.4–10.8)

## 2022-10-09 PROCEDURE — 83735 ASSAY OF MAGNESIUM: CPT | Performed by: NURSE PRACTITIONER

## 2022-10-09 PROCEDURE — 93880 EXTRACRANIAL BILAT STUDY: CPT

## 2022-10-09 PROCEDURE — G0378 HOSPITAL OBSERVATION PER HR: HCPCS

## 2022-10-09 PROCEDURE — 99214 OFFICE O/P EST MOD 30 MIN: CPT | Performed by: INTERNAL MEDICINE

## 2022-10-09 PROCEDURE — 80048 BASIC METABOLIC PNL TOTAL CA: CPT | Performed by: NURSE PRACTITIONER

## 2022-10-09 PROCEDURE — 84484 ASSAY OF TROPONIN QUANT: CPT | Performed by: NURSE PRACTITIONER

## 2022-10-09 PROCEDURE — 83036 HEMOGLOBIN GLYCOSYLATED A1C: CPT | Performed by: HOSPITALIST

## 2022-10-09 PROCEDURE — 83690 ASSAY OF LIPASE: CPT | Performed by: PHYSICIAN ASSISTANT

## 2022-10-09 PROCEDURE — 93005 ELECTROCARDIOGRAM TRACING: CPT | Performed by: NURSE PRACTITIONER

## 2022-10-09 PROCEDURE — 85025 COMPLETE CBC W/AUTO DIFF WBC: CPT | Performed by: NURSE PRACTITIONER

## 2022-10-09 PROCEDURE — 82150 ASSAY OF AMYLASE: CPT | Performed by: PHYSICIAN ASSISTANT

## 2022-10-09 PROCEDURE — 93010 ELECTROCARDIOGRAM REPORT: CPT | Performed by: INTERNAL MEDICINE

## 2022-10-09 PROCEDURE — 99219 PR INITIAL OBSERVATION CARE/DAY 50 MINUTES: CPT | Performed by: INTERNAL MEDICINE

## 2022-10-09 RX ORDER — ACETAMINOPHEN 650 MG/1
650 SUPPOSITORY RECTAL EVERY 4 HOURS PRN
Status: DISCONTINUED | OUTPATIENT
Start: 2022-10-09 | End: 2022-10-11 | Stop reason: HOSPADM

## 2022-10-09 RX ORDER — SODIUM CHLORIDE 0.9 % (FLUSH) 0.9 %
10 SYRINGE (ML) INJECTION EVERY 12 HOURS SCHEDULED
Status: DISCONTINUED | OUTPATIENT
Start: 2022-10-09 | End: 2022-10-11 | Stop reason: HOSPADM

## 2022-10-09 RX ORDER — SODIUM CHLORIDE 9 MG/ML
75 INJECTION, SOLUTION INTRAVENOUS CONTINUOUS
Status: ACTIVE | OUTPATIENT
Start: 2022-10-09 | End: 2022-10-10

## 2022-10-09 RX ORDER — PANTOPRAZOLE SODIUM 40 MG/1
40 TABLET, DELAYED RELEASE ORAL EVERY MORNING
Status: DISCONTINUED | OUTPATIENT
Start: 2022-10-09 | End: 2022-10-11 | Stop reason: HOSPADM

## 2022-10-09 RX ORDER — ENOXAPARIN SODIUM 100 MG/ML
40 INJECTION SUBCUTANEOUS DAILY
Status: DISCONTINUED | OUTPATIENT
Start: 2022-10-09 | End: 2022-10-11 | Stop reason: HOSPADM

## 2022-10-09 RX ORDER — ACETAMINOPHEN 160 MG/5ML
650 SOLUTION ORAL EVERY 4 HOURS PRN
Status: DISCONTINUED | OUTPATIENT
Start: 2022-10-09 | End: 2022-10-11 | Stop reason: HOSPADM

## 2022-10-09 RX ORDER — ASPIRIN 81 MG/1
81 TABLET ORAL DAILY
Status: DISCONTINUED | OUTPATIENT
Start: 2022-10-09 | End: 2022-10-11 | Stop reason: HOSPADM

## 2022-10-09 RX ORDER — ONDANSETRON 2 MG/ML
4 INJECTION INTRAMUSCULAR; INTRAVENOUS EVERY 6 HOURS PRN
Status: DISCONTINUED | OUTPATIENT
Start: 2022-10-09 | End: 2022-10-11 | Stop reason: HOSPADM

## 2022-10-09 RX ORDER — ALLOPURINOL 100 MG/1
50 TABLET ORAL DAILY
Status: DISCONTINUED | OUTPATIENT
Start: 2022-10-09 | End: 2022-10-11 | Stop reason: HOSPADM

## 2022-10-09 RX ORDER — NITROGLYCERIN 0.4 MG/1
0.4 TABLET SUBLINGUAL
Status: DISCONTINUED | OUTPATIENT
Start: 2022-10-09 | End: 2022-10-11 | Stop reason: HOSPADM

## 2022-10-09 RX ORDER — SODIUM CHLORIDE 0.9 % (FLUSH) 0.9 %
10 SYRINGE (ML) INJECTION AS NEEDED
Status: DISCONTINUED | OUTPATIENT
Start: 2022-10-09 | End: 2022-10-11 | Stop reason: HOSPADM

## 2022-10-09 RX ORDER — ROSUVASTATIN CALCIUM 20 MG/1
40 TABLET, COATED ORAL DAILY
Status: DISCONTINUED | OUTPATIENT
Start: 2022-10-09 | End: 2022-10-11 | Stop reason: HOSPADM

## 2022-10-09 RX ORDER — ACETAMINOPHEN 325 MG/1
650 TABLET ORAL EVERY 4 HOURS PRN
Status: DISCONTINUED | OUTPATIENT
Start: 2022-10-09 | End: 2022-10-11 | Stop reason: HOSPADM

## 2022-10-09 RX ORDER — PRASUGREL 10 MG/1
10 TABLET, FILM COATED ORAL DAILY
Status: DISCONTINUED | OUTPATIENT
Start: 2022-10-09 | End: 2022-10-11 | Stop reason: HOSPADM

## 2022-10-09 RX ORDER — ISOSORBIDE MONONITRATE 30 MG/1
30 TABLET, EXTENDED RELEASE ORAL
Status: DISCONTINUED | OUTPATIENT
Start: 2022-10-09 | End: 2022-10-11 | Stop reason: HOSPADM

## 2022-10-09 RX ADMIN — ACETAMINOPHEN 650 MG: 325 TABLET ORAL at 17:13

## 2022-10-09 RX ADMIN — Medication 10 ML: at 08:53

## 2022-10-09 RX ADMIN — PRASUGREL 10 MG: 10 TABLET, FILM COATED ORAL at 08:52

## 2022-10-09 RX ADMIN — PANTOPRAZOLE SODIUM 40 MG: 40 TABLET, DELAYED RELEASE ORAL at 08:52

## 2022-10-09 RX ADMIN — ASPIRIN 81 MG: 81 TABLET, COATED ORAL at 08:52

## 2022-10-09 RX ADMIN — Medication 10 ML: at 20:17

## 2022-10-09 RX ADMIN — SODIUM CHLORIDE 75 ML/HR: 9 INJECTION, SOLUTION INTRAVENOUS at 10:13

## 2022-10-09 RX ADMIN — ISOSORBIDE MONONITRATE 30 MG: 30 TABLET, EXTENDED RELEASE ORAL at 10:10

## 2022-10-09 RX ADMIN — ALLOPURINOL 50 MG: 100 TABLET ORAL at 18:03

## 2022-10-09 RX ADMIN — ROSUVASTATIN CALCIUM 40 MG: 20 TABLET, FILM COATED ORAL at 08:52

## 2022-10-09 NOTE — PROGRESS NOTES
Geovanny Barnett  [id number]  1970   LOS: 0 days   Patient Care Team:  Champ Feng MD as PCP - General (Internal Medicine)    Chief Complaint: Chest pain    Subjective Patient with recent hospitalization for chest pain and had a stent to the LAD 9/14/2022.  He presents back to [facility] ED 10/8/2022 for recurrent  chest pain.  He reports that ever since having his heart cath he continues to have intermittent chest pain that feels like squeezing/pressure in the substernal area of his chest.  It does not radiate but he does endorse nausea and shortness of breath with the chest pain.  He had an EGD last month and was not felt to have GERD.  The patient feels like the chest pain occurs more at night and is worse after he eats.  He feels like it is probably esophageal spasms but it makes him very anxious whenever he starts having this chest discomfort and is afraid that he is having a heart attack.  After his last heart cath he was placed on Effient and metoprolol.  Earlier in the day he had taken his medication while driving and became nauseous and had chest pain.  He took nitroglycerin sublingual which partially helped.  While in the ED, he was bradycardic (46bpm) but negative troponins x2.  He received aspirin, Ativan, and Protonix in the ED.  He has not consumed alcohol in months.  Interestingly w h/o disability due to bilateral osteonecrosis of hips.  Chest x-ray is negative for acute findings.  ECG with no acute ischemia.  He has intermittently had headaches with dizziness.  His metoprolol has been held on admission due to bradycardia.    Objective     Vital Sign Min/Max for last 24 hours  Temp  Min: 97.8 °F (36.6 °C)  Max: 98 °F (36.7 °C)   BP  Min: 107/77  Max: 129/84   Pulse  Min: 46  Max: 60   Resp  Min: 16  Max: 18   SpO2  Min: 92 %  Max: 99 %   No data recorded   Weight  Min: 90.7 kg (200 lb)  Max: 90.9 kg (200 lb 8 oz)         10/08/22  1927 10/09/22  0020   Weight: 90.7 kg (200 lb) 90.9 kg (200 lb 8  oz)       No intake or output data in the 24 hours ending 10/09/22 0836    Physical Exam:     General Appearance:    Alert, cooperative, in no acute distress   Lungs:     Clear to auscultation,respirations regular, even and                   unlabored    Heart:    Regular and normal rate, normal S1 and S2, no            murmur, no gallop, no rub, no click   Abdomen:  Extremities:   Soft, non-tender, bowel sounds audible x4    No edema, normal range of motion   Pulses:   Pulses palpable and equal bilaterally      Results Review:   Results from last 7 days   Lab Units 10/08/22  1943   SODIUM mmol/L 139   POTASSIUM mmol/L 4.1   CHLORIDE mmol/L 104   CO2 mmol/L 24.0   BUN mg/dL 16   CREATININE mg/dL 1.18   GLUCOSE mg/dL 86   CALCIUM mg/dL 9.7     Results from last 7 days   Lab Units 10/09/22  0801 10/08/22  1943   WBC 10*3/mm3 7.68 9.68   HEMOGLOBIN g/dL 14.6 15.2   HEMATOCRIT % 42.9 44.7   PLATELETS 10*3/mm3 185 221             Results from last 7 days   Lab Units 10/09/22  0801 10/08/22  2151 10/08/22  1943   TROPONIN T ng/mL <0.010 <0.010 <0.010     Chest x-ray 10/8/2022:  No acute cardiopulmonary process.    ECG 10/9/2022:  Normal sinus rhythm  Inferior infarct , age undetermined  Abnormal ECG  When compared with ECG of 08-OCT-2022 21:46,  QT has lengthened    Medication Review: Reviewed    Assessment & Plan   Patient with recent stent to the LAD September 2022 with recurrent chest pain partially relieved by nitroglycerin sublingual.    We will review carotid duplex results when available, unsure indication?  We would defer repeat MPS/LHC.  Will add Imdur 30 mg daily as element of endothelial dysfunction probable and recent GI evaluation not concerning for reflux.   Patient's metoprolol will  Not be reordered  due to bradycardia and vagal overtone.    We do not feel this is ACS. Dr Kelly  to resume care in morning.      GERD (gastroesophageal reflux disease)    Hyperlipemia    Coronary artery disease involving  native heart with unstable angina pectoris (HCC)    Solitary kidney, congenital    Chest pain    Symptomatic bradycardia    Scribed for Geovanny Brady MD by Debby Garrison, APRN. 10/9/2022  08:56 EDT      10/09/22  08:36 EDT

## 2022-10-09 NOTE — H&P
[facility] Medicine Services  HISTORY AND PHYSICAL    Patient Name: Geovanny Barnett  : 1970  MRN: [id number]  Primary Care Physician: Champ Feng MD  Date of admission: 10/8/2022    Subjective   Subjective     Chief Complaint:  Chest pain    HPI:  Geovanny Barnett is a 52 y.o. male with PMH significant for CAD, HLD, GERD, gout, solitary kidney, who presents to the ED with complaint of chest pain.  Patient was recently admitted  - 9/15/2022 secondary to chest pain.  LHC on 2022 showed 90% stenosis of proximal LAD.  JUS was placed.  He states since having the heart cath he is continued to have intermittent chest pain.  He describes the pain as squeezing and pressure.  The pain is located substernal and left chest.  It does not radiate to his neck or arms.  He does have associated nausea and shortness of breath.  He also reports having intermittent headache with dizziness.  Of note, he states that he was discharged on 2 new medications one of them being Effient and the other one being metoprolol.  Today, he states that he took his medication while driving he became very nauseated and developed chest pain.  He notes that he took NTG sublingual which helped improve his pain.  He states that the pain recurred prompting presentation to the ED.  Upon arrival to the ED, he is noted to be bradycardic.  Troponin is negative x2.  EKG is negative for acute ischemia.  CXR is negative for acute findings.  While in the ED, he was given  mg, Ativan, and Protonix.  He states that his chest pain is currently improved, but he continues to feel like he has a knot in his throat.  He will be admitted to hospital medicine for further evaluation.    Review of Systems   Constitutional: Negative for activity change, appetite change, chills, diaphoresis, fatigue and fever.   Eyes: Negative.  Negative for visual disturbance.   Respiratory: Positive for chest tightness and shortness of  breath. Negative for cough and wheezing.    Cardiovascular: Positive for chest pain. Negative for palpitations and leg swelling.   Gastrointestinal: Positive for diarrhea and nausea. Negative for abdominal distention, abdominal pain, blood in stool, constipation and vomiting.   Endocrine: Negative.    Genitourinary: Negative for difficulty urinating, dysuria, frequency and urgency.   Musculoskeletal: Negative.  Negative for arthralgias, back pain, myalgias and neck pain.   Skin: Negative.  Negative for color change, pallor, rash and wound.   Allergic/Immunologic: Negative.  Negative for immunocompromised state.   Neurological: Positive for dizziness, light-headedness and headaches. Negative for seizures, syncope, speech difficulty, weakness and numbness.   Hematological: Negative.  Negative for adenopathy. Does not bruise/bleed easily.   Psychiatric/Behavioral: Negative for confusion. The patient is nervous/anxious.         All other systems reviewed and are negative.     Personal History     Past Medical History:   Diagnosis Date   • GERD (gastroesophageal reflux disease)    • Gout    • Hyperlipidemia    • Myocardial infarction (HCC)          Past Surgical History:   Procedure Laterality Date   • CARDIAC CATHETERIZATION N/A 9/14/2022    Procedure: Left Heart Cath;  Surgeon: Bro Rodriguez MD;  Location: [facility] CATH INVASIVE LOCATION;  Service: Cardiology;  Laterality: N/A;   • CORONARY ANGIOPLASTY WITH STENT PLACEMENT     • ESOPHAGEAL MOTILITY STUDY N/A 8/16/2022    Procedure: MANOMETRY;  Surgeon: Stanley Gasca MD;  Location:  [facility];  Service: Gastroenterology;  Laterality: N/A;  LES, per screen read out at 42.6 Patient tolerated esophageal manometry well and probe was advanced to 37cm   • GASTRIC PH MONITORING 24HR N/A 8/23/2022    Procedure: GASTRIC PH MONITORING 24HR;  Surgeon: Stanley Gasca MD;  Location:  [facility];  Service: General;  Laterality: N/A;  pH impedance probe dropped to  3.5cm  Les from manometry testing registered at 42.6cm   • HIP SURGERY Bilateral        Family History:  family history includes Cancer in his father and mother; Diabetes in his mother; Heart valve disorder in his father; Hypertension in his mother. Otherwise pertinent FHx was reviewed and unremarkable.     Social History:  reports that he quit smoking about 6 years ago. He has a 4.75 pack-year smoking history. He has never used smokeless tobacco. He reports current alcohol use of about 12.0 standard drinks per week. He reports that he does not use drugs.  Social History     Social History Narrative    L[facility] in [facility].        Medications:  Allopurinol, aspirin, famotidine, metoprolol succinate XL, nitroglycerin, omeprazole, prasugrel, and rosuvastatin    No Known Allergies    Objective   Objective     Vital Signs:   Temp:  [97.9 °F (36.6 °C)-98 °F (36.7 °C)] 98 °F (36.7 °C)  Heart Rate:  [46-58] 52  Resp:  [16-18] 16  BP: (107-124)/(77-87) 124/80    Physical Exam   Constitutional: Awake, alert, no acute distress  Eyes: PERRLA, sclerae anicteric, no conjunctival injection  HENT: NCAT, mucous membranes moist  Neck: Supple, no thyromegaly, no lymphadenopathy, trachea midline  Respiratory: Clear to auscultation bilaterally, nonlabored respirations   Cardiovascular: Bradycardic, no murmurs, rubs, or gallops, palpable pedal pulses bilaterally  Gastrointestinal: Positive bowel sounds, soft, nontender, nondistended  Musculoskeletal: No bilateral ankle edema, no clubbing or cyanosis to extremities  Psychiatric: Appropriate affect, cooperative  Neurologic: Oriented x 3, strength symmetric in all extremities, Cranial Nerves grossly intact to confrontation, speech clear  Skin: No rashes      Results Reviewed:  I have personally reviewed most recent indicated data and agree with findings including:  [x]  Laboratory  [x]  Radiology  [x]  EKG/Telemetry  []  Pathology  []  Cardiac/Vascular Studies  [x]  Old records  []   Other:  Most pertinent findings include:      LAB RESULTS:      Lab 10/08/22  1943   WBC 9.68   HEMOGLOBIN 15.2   HEMATOCRIT 44.7   PLATELETS 221   NEUTROS ABS 6.06   IMMATURE GRANS (ABS) 0.02   LYMPHS ABS 2.90   MONOS ABS 0.54   EOS ABS 0.13   MCV 90.5         Lab 10/08/22  1943   SODIUM 139   POTASSIUM 4.1   CHLORIDE 104   CO2 24.0   ANION GAP 11.0   BUN 16   CREATININE 1.18   EGFR 73.3   GLUCOSE 86   CALCIUM 9.7   TSH 1.550         Lab 10/08/22  1943   TOTAL PROTEIN 8.0   ALBUMIN 4.90   GLOBULIN 3.1   ALT (SGPT) 38   AST (SGOT) 29   BILIRUBIN 0.6   ALK PHOS 80   LIPASE 38         Lab 10/08/22  2151 10/08/22  1943   PROBNP  --  15.1   TROPONIN T <0.010 <0.010                 Brief Urine Lab Results  (Last result in the past 365 days)      Color   Clarity   Blood   Leuk Est   Nitrite   Protein   CREAT   Urine HCG        09/13/22 2355 Yellow   Clear   Negative   Negative   Negative   30 mg/dL (1+)               Microbiology Results (last 10 days)     ** No results found for the last 240 hours. **          XR Chest 1 View    Result Date: 10/8/2022  EXAMINATION: XR CHEST 1 VW DATE: 10/8/2022 7:55 PM INDICATION:  Chest pain; COMPARISON:  September 13, 2022 FINDINGS: No focal consolidation, pleural effusion, or pneumothorax. Cardiomediastinal silhouette  unremarkable. No acute osseous abnormality.     Impression: 1.  No acute cardiopulmonary process. Electronically signed by:  Ant Saucedo M.D.  10/8/2022 7:13 PM Mountain Time      Results for orders placed during the hospital encounter of 09/13/22    Adult Transthoracic Echo Complete W/ Cont if Necessary Per Protocol    Interpretation Summary  · Left ventricular ejection fraction appears to be 51 - 55%.  · Estimated right ventricular systolic pressure from tricuspid regurgitation is normal (<35 mmHg). Calculated right ventricular systolic pressure from tricuspid regurgitation is 20 mmHg.      Assessment & Plan   Assessment & Plan       GERD (gastroesophageal reflux  disease)    Hyperlipemia    Coronary artery disease involving native heart with unstable angina pectoris (HCC)    Solitary kidney, congenital    Chest pain    Symptomatic bradycardia    52 y.o. male with PMH significant for CAD, HLD, GERD, gout, solitary kidney, who presents to the ED with complaint of chest pain.    Chest pain  Symptomatic bradycardia  CAD  - Cardiology consult in the a.m., follows outpatient with Dr. Kam  -N.p.o. pending cardiology evaluation  - Hold metoprolol  - Continue daily ASA  - Continue Effient, recent cardiac stent to LAD 9/14/2022  - Troponin negative x2, EKG without acute ischemia  - Troponin, CBC, BMP, EKG in a.m.    Dyslipidemia  - Continue rosuvastatin    Headache  Dizziness  - Carotid duplex in a.m. (pt request)    GERD  - Continue PPI    Gout  - Continue allopurinol      DVT prophylaxis: Lovenox    CODE STATUS:    Code Status (Patient has no pulse and is not breathing): CPR (Attempt to Resuscitate)  Medical Interventions (Patient has pulse or is breathing): Full Support      This note has been completed as part of a split-shared workflow.     Signature:   Electronically signed by IRVIN Hayes, 10/09/22, 12:39 AM EDT.    Attending   Admission Attestation       I have performed an independent face-to-face diagnostic evaluation including performing an independent physical examination as documented here.  The documented plan of care above was reviewed and developed with the advanced practice clinician (APC).      Brief Summary Statement:   Geovanny Barnett is a 52 y.o. male With a PMH significant for CAD status post stenting (9/2022), HLD, GERD, esophageal spasm (per pt), solitary kidney who comes to the ED due to chest pain.  Patient reports ongoing left-sided chest pain since [facility] and subsequent stent placement last month.  He says symptoms occur at rest and with activity, even wake him up at night at times.  Today he began to experience recurrence of left-sided chest  pain which was more severe.  He reports associated presyncope, shortness of breath, nausea and headache.  He was concerned and came to the ED for further evaluation.  He reports improvement of chest pain with nitroglycerin.  Patient reports compliance with home medication since discharge.    Remainder of detailed HPI is as noted by APC and has been reviewed and/or edited by me for completeness.    Attending Physical Exam:  Temp:  [97.9 °F (36.6 °C)-98 °F (36.7 °C)] 98 °F (36.7 °C)  Heart Rate:  [46-58] 52  Resp:  [16-18] 16  BP: (107-124)/(77-87) 124/80    Constitutional: Awake, alert  Eyes: PERRLA, sclerae anicteric, no conjunctival injection  HENT: NCAT, mucous membranes moist  Neck: Supple, no thyromegaly, no lymphadenopathy, trachea midline  Respiratory: Clear to auscultation bilaterally, nonlabored respirations   Cardiovascular: RRR, no murmurs, rubs, or gallops, palpable pedal pulses bilaterally  Gastrointestinal: Positive bowel sounds, soft, nontender, nondistended  Musculoskeletal: No bilateral ankle edema, no clubbing or cyanosis to extremities  Psychiatric: Appropriate affect, cooperative  Neurologic: Oriented x 3, strength symmetric in all extremities, Cranial Nerves grossly intact to confrontation, speech clear  Skin: No rashes      Brief Assessment/Plan :  See detailed assessment and plan developed with APC which I have reviewed and/or edited for completeness.      Tara Villagran DO  10/09/22

## 2022-10-09 NOTE — PROGRESS NOTES
[facility] Medicine Services  ADMISSION FOLLOW-UP NOTE          Patient admitted after midnight, H&P by my partner performed earlier on today's date reviewed.  Interim findings, labs, and charting also reviewed.        [facility] Hospital Problem List has been managed and updated to include any new diagnoses:  Active Hospital Problems    Diagnosis  POA   • Symptomatic bradycardia [R00.1]  Yes   • History of prediabetes [Z87.898]  Yes   • Dehydration [E86.0]  Yes   • Chest pain [R07.9]  Yes   • Hyperlipemia [E78.5]  Yes   • Solitary kidney, congenital [Q60.0]  Not Applicable   • GERD (gastroesophageal reflux disease) [K21.9]  Yes   • Coronary artery disease involving native heart with unstable angina pectoris (HCC) [I25.110]  Yes      Resolved Hospital Problems   No resolved problems to display.         ADDITIONAL PLAN:  - detailed assessment and plan from admission reviewed  - appears dehydrated on exam  - has had dizziness / lightheadedness  - chest pain  - recent cardiac intervention  - trial of NS infusion as BP was 107/77  - normal EF  - JUS to LAD by Dr. Rodriguez about 25 days ago  - has been on aspirin / Effient    Bradley Maxwell MD  10/09/22

## 2022-10-09 NOTE — ED PROVIDER NOTES
"Subjective   History of Present Illness  This is a 52-year-old male that presents the ER with onset of substernal and left-sided chest pressure and pain that started around 1300 this afternoon.  Patient had recent cardiac stent placed on 9/14/2022 due to 90% stenosis of the proximal LAD.  There was no significant disease of the left main, the large ramus intermedius, the circumflex of the RCA.  EF was 65%. Patient was discharged with Effient 10 mg by mouth daily in addition to his aspirin 81 mg by mouth daily, Crestor 40 mg by mouth daily, and GI medications of Prilosec and Pepcid.  Patient does have history of esophageal spasms and GERD but denies any significant symptoms of indigestion today.  He did eat a plain hamburger and drank a lemonade at an outdoor festival at [place].  He also walked around for 2 hours at the festival and describes waxing and waning chest pressure with severe nausea and some associated shortness of breath.  Patient took 1 sublingual nitroglycerin and it significantly improved his chest discomfort, so with recent cardiac stent, he decided to come to the ER for further assessment.  According to significant other, patient has had waxing and waning chest discomfort ever since the recent stent.  He has been taking his Pepcid and Prilosec daily, but he does not take any type of antianginal medication including Ranexa or Imdur.  Patient denies any recent cough or congestion.  He denies lower extremity pedal edema.  No other concerns at this time significant other also reports some increased anxiety after recent cardiac stent.  Patient was prescribed Ativan as needed and it seems to have helped improve some of his waxing and waning chest discomfort.    History provided by:  Patient  Chest Pain  Pain location:  Substernal area and L chest  Pain quality: pressure and tightness    Pain quality comment:  \"Spasms\"  Pain radiates to:  Does not radiate  Onset quality:  Sudden  Duration:  6 " hours  Timing:  Intermittent  Progression:  Waxing and waning  Chronicity:  Recurrent  Context comment:  Patient was walking around at a festival at [place] for 2 hours and was having waxing and waning substernal left-sided chest pressure with spasms.  He reported associated shortness of breath and nausea.  Recent cardiac stent to proximal LAD on 9/14/2022   Relieved by:  Nitroglycerin  Worsened by:  Exertion  Ineffective treatments:  None tried  Associated symptoms: nausea and shortness of breath    Associated symptoms: no abdominal pain, no anxiety, no back pain, no cough, no diaphoresis, no dizziness, no dysphagia, no fatigue, no fever, no headache, no heartburn, no lower extremity edema, no near-syncope, no numbness, no palpitations, no PND, no syncope and no vomiting    Risk factors: coronary artery disease, high cholesterol, hypertension and male sex    Risk factors comment:  Recent cardiac stent to the proximal LAD per Dr. Rodriguez on 9/14/22.      Review of Systems   Constitutional: Positive for appetite change. Negative for chills, diaphoresis, fatigue and fever.   HENT: Negative.  Negative for congestion, ear pain, postnasal drip, rhinorrhea, sinus pressure, sinus pain, sneezing, sore throat and trouble swallowing.    Respiratory: Positive for shortness of breath. Negative for cough.    Cardiovascular: Positive for chest pain. Negative for palpitations, leg swelling, syncope, PND and near-syncope.        History of CAD with cardiac stents x2, the most recent 1 was to proximal LAD on 9/14/2022 with EF of 65%.   Gastrointestinal: Positive for nausea. Negative for abdominal pain, constipation, diarrhea, heartburn and vomiting.   Genitourinary: Negative.  Negative for dysuria, flank pain, frequency and urgency.   Musculoskeletal: Negative.  Negative for back pain.   Neurological: Negative for dizziness, numbness and headaches.   Psychiatric/Behavioral:        Increased anxiety, more noted after recent cardiac  "stent according to significant other.   All other systems reviewed and are negative.      Past Medical History:   Diagnosis Date   • GERD (gastroesophageal reflux disease)    • Gout    • Hyperlipidemia    • Myocardial infarction (HCC)        No Known Allergies    Past Surgical History:   Procedure Laterality Date   • CARDIAC CATHETERIZATION N/A 2022    Procedure: Left Heart Cath;  Surgeon: Bro Rodriguez MD;  Location:  [facility];  Service: Cardiology;  Laterality: N/A;   • CORONARY ANGIOPLASTY WITH STENT PLACEMENT     • ESOPHAGEAL MOTILITY STUDY N/A 2022    Procedure: MANOMETRY;  Surgeon: Stanley Gasca MD;  Location:  [facility];  Service: Gastroenterology;  Laterality: N/A;  LES, per screen read out at 42.6 Patient tolerated esophageal manometry well and probe was advanced to 37cm   • GASTRIC PH MONITORING 24HR N/A 2022    Procedure: GASTRIC PH MONITORING 24HR;  Surgeon: Stanley Gasca MD;  Location:  [facility];  Service: General;  Laterality: N/A;  pH impedance probe dropped to 3.5cm  Les from manometry testing registered at 42.6cm   • HIP SURGERY Bilateral        Family History   Problem Relation Age of Onset   • Hypertension Mother    • Diabetes Mother    • Cancer Mother    • Cancer Father    • Heart valve disorder Father        Social History     Socioeconomic History   • Marital status: Single   • Number of children: 0   Tobacco Use   • Smoking status: Former     Packs/day: 0.25     Years: 19.00     Pack years: 4.75     Types: Cigarettes     Quit date:      Years since quittin.7   • Smokeless tobacco: Never   Vaping Use   • Vaping Use: Never used   Substance and Sexual Activity   • Alcohol use: Yes     Alcohol/week: 12.0 standard drinks     Types: 12 Cans of beer per week     Comment: \"6 beers every two weeks\"   • Drug use: No   • Sexual activity: Defer           Objective   Physical Exam  Vitals and nursing note reviewed.   Constitutional:       " Appearance: Normal appearance.   HENT:      Head: Normocephalic and atraumatic.      Nose: Nose normal.      Mouth/Throat:      Mouth: Mucous membranes are moist.      Pharynx: Oropharynx is clear.   Eyes:      Extraocular Movements: Extraocular movements intact.      Conjunctiva/sclera: Conjunctivae normal.      Pupils: Pupils are equal, round, and reactive to light.   Cardiovascular:      Rate and Rhythm: Normal rate and regular rhythm.  No extrasystoles are present.     Pulses: Normal pulses.      Heart sounds: Normal heart sounds. No murmur heard.     Comments: Regular rate and rhythm.  No ectopy.  No pedal edema to lower extremities.  Pulmonary:      Effort: Pulmonary effort is normal.      Breath sounds: Normal breath sounds.      Comments: Lungs are clear to auscultation bilaterally.  Abdominal:      General: Bowel sounds are normal. There is no distension.      Palpations: Abdomen is soft.      Tenderness: There is no abdominal tenderness. There is no right CVA tenderness, left CVA tenderness, guarding or rebound.      Comments: Abdomen soft and nontender.  No flank or CVA tenderness.   Musculoskeletal:         General: Normal range of motion.      Cervical back: Normal range of motion and neck supple.      Right lower leg: No edema.      Left lower leg: No edema.   Skin:     General: Skin is warm and dry.   Neurological:      General: No focal deficit present.      Mental Status: He is alert.         Procedures           ED Course  ED Course as of 10/09/22 0024   Sat Oct 08, 2022   2219 Initial EKG showed second-degree AV block but no evidence of acute ST-T wave changes consistent with ischemia.  Repeat 2-hour troponin shows sinus bradycardia without any ectopy or ischemia.  Chest x-ray showed no acute cardiopulmonary process.  CBC and chemistries were within normal limits.  TSH is 1.55 and troponins x2 sets are less than 0.010.  Patient had almost complete resolution of chest pain with sublingual  nitroglycerin prior to arrival.  We gave him Ativan 0.5 mg IV and Protonix 40 mg IV.  Discussed the case with Dr. Bowen, ER attending physician.  Heart score is 5. [FC]   Sun Oct 09, 2022   0021 I paged hospitalist,  To discuss the case, and she is agreeable to admission on telemetry.  I updated patient and spouse on all results and need for admission and they are very appreciative.  Patient denies any chest pain at present.  Vital signs are stable.  Heart rate has dipped down into the high 40s Mini,, but otherwise patient's vital signs have been stable. [FC]      ED Course User Index  [FC] Chrissy Sheriff, ANDREW           Recent Results (from the past 24 hour(s))   ECG 12 Lead    Collection Time: 10/08/22  7:32 PM   Result Value Ref Range    QT Interval 428 ms    QTC Interval 409 ms   Troponin    Collection Time: 10/08/22  7:43 PM    Specimen: Blood   Result Value Ref Range    Troponin T <0.010 0.000 - 0.030 ng/mL   Comprehensive Metabolic Panel    Collection Time: 10/08/22  7:43 PM    Specimen: Blood   Result Value Ref Range    Glucose 86 65 - 99 mg/dL    BUN 16 6 - 20 mg/dL    Creatinine 1.18 0.76 - 1.27 mg/dL    Sodium 139 136 - 145 mmol/L    Potassium 4.1 3.5 - 5.2 mmol/L    Chloride 104 98 - 107 mmol/L    CO2 24.0 22.0 - 29.0 mmol/L    Calcium 9.7 8.6 - 10.5 mg/dL    Total Protein 8.0 6.0 - 8.5 g/dL    Albumin 4.90 3.50 - 5.20 g/dL    ALT (SGPT) 38 1 - 41 U/L    AST (SGOT) 29 1 - 40 U/L    Alkaline Phosphatase 80 39 - 117 U/L    Total Bilirubin 0.6 0.0 - 1.2 mg/dL    Globulin 3.1 gm/dL    A/G Ratio 1.6 g/dL    BUN/Creatinine Ratio 13.6 7.0 - 25.0    Anion Gap 11.0 5.0 - 15.0 mmol/L    eGFR 73.3 >60.0 mL/min/1.73   Lipase    Collection Time: 10/08/22  7:43 PM    Specimen: Blood   Result Value Ref Range    Lipase 38 13 - 60 U/L   BNP    Collection Time: 10/08/22  7:43 PM    Specimen: Blood   Result Value Ref Range    proBNP 15.1 0.0 - 900.0 pg/mL   Green Top (Gel)    Collection Time: 10/08/22  7:43 PM    Result Value Ref Range    Extra Tube Hold for add-ons.    Lavender Top    Collection Time: 10/08/22  7:43 PM   Result Value Ref Range    Extra Tube hold for add-on    Gold Top - SST    Collection Time: 10/08/22  7:43 PM   Result Value Ref Range    Extra Tube Hold for add-ons.    Gray Top    Collection Time: 10/08/22  7:43 PM   Result Value Ref Range    Extra Tube Hold for add-ons.    Light Blue Top    Collection Time: 10/08/22  7:43 PM   Result Value Ref Range    Extra Tube Hold for add-ons.    CBC Auto Differential    Collection Time: 10/08/22  7:43 PM    Specimen: Blood   Result Value Ref Range    WBC 9.68 3.40 - 10.80 10*3/mm3    RBC 4.94 4.14 - 5.80 10*6/mm3    Hemoglobin 15.2 13.0 - 17.7 g/dL    Hematocrit 44.7 37.5 - 51.0 %    MCV 90.5 79.0 - 97.0 fL    MCH 30.8 26.6 - 33.0 pg    MCHC 34.0 31.5 - 35.7 g/dL    RDW 13.3 12.3 - 15.4 %    RDW-SD 44.2 37.0 - 54.0 fl    MPV 10.9 6.0 - 12.0 fL    Platelets 221 140 - 450 10*3/mm3    Neutrophil % 62.6 42.7 - 76.0 %    Lymphocyte % 30.0 19.6 - 45.3 %    Monocyte % 5.6 5.0 - 12.0 %    Eosinophil % 1.3 0.3 - 6.2 %    Basophil % 0.3 0.0 - 1.5 %    Immature Grans % 0.2 0.0 - 0.5 %    Neutrophils, Absolute 6.06 1.70 - 7.00 10*3/mm3    Lymphocytes, Absolute 2.90 0.70 - 3.10 10*3/mm3    Monocytes, Absolute 0.54 0.10 - 0.90 10*3/mm3    Eosinophils, Absolute 0.13 0.00 - 0.40 10*3/mm3    Basophils, Absolute 0.03 0.00 - 0.20 10*3/mm3    Immature Grans, Absolute 0.02 0.00 - 0.05 10*3/mm3    nRBC 0.0 0.0 - 0.2 /100 WBC   TSH    Collection Time: 10/08/22  7:43 PM    Specimen: Blood   Result Value Ref Range    TSH 1.550 0.270 - 4.200 uIU/mL   ECG 12 Lead    Collection Time: 10/08/22  9:46 PM   Result Value Ref Range    QT Interval 454 ms    QTC Interval 397 ms   Troponin    Collection Time: 10/08/22  9:51 PM    Specimen: Blood   Result Value Ref Range    Troponin T <0.010 0.000 - 0.030 ng/mL     Note: In addition to lab results from this visit, the labs listed above may include labs  "taken at another facility or during a different encounter within the last 24 hours. Please correlate lab times with ED admission and discharge times for further clarification of the services performed during this visit.    XR Chest 1 View   Final Result      1.  No acute cardiopulmonary process.               Electronically signed by:  Ant Saucedo M.D.     10/8/2022 7:13 PM Mountain Time        Vitals:    10/08/22 1927 10/08/22 2155 10/08/22 2323 10/08/22 2344   BP: 123/87 107/77 123/81    BP Location: Right arm  Right arm    Patient Position:   Lying    Pulse: 58  (!) 46 52   Resp: 18  16    Temp: 97.9 °F (36.6 °C)  98 °F (36.7 °C)    TempSrc: Oral  Oral    SpO2: 99%  97% 96%   Weight: 90.7 kg (200 lb)      Height: 167.6 cm (66\")        Medications   sodium chloride 0.9 % flush 10 mL (has no administration in time range)   aspirin chewable tablet 324 mg (324 mg Oral Given 10/8/22 2337)   LORazepam (ATIVAN) injection 0.5 mg (0.5 mg Intravenous Given 10/8/22 2030)   pantoprazole (PROTONIX) injection 40 mg (40 mg Intravenous Given 10/8/22 2030)     ECG/EMG Results (last 24 hours)     Procedure Component Value Units Date/Time    ECG 12 Lead [[id number]] Collected: 10/08/22 1932     Updated: 10/08/22 1933     QT Interval 428 ms      QTC Interval 409 ms     Narrative:      Test Reason : chest pain  Blood Pressure :   */*   mmHG  Vent. Rate :  55 BPM     Atrial Rate : 107 BPM     P-R Int : 168 ms          QRS Dur :  90 ms      QT Int : 428 ms       P-R-T Axes :  30  -9  39 degrees     QTc Int : 409 ms    Sinus tachycardia with 2nd degree AV block with 2:1 AV conduction  Abnormal ECG  When compared with ECG of 14-SEP-2022 15:09,  Sinus rhythm is now with 2nd degree AV block    Referred By: ESTELA           Confirmed By:         ECG 12 Lead   Final Result   Test Reason : chest pain   Blood Pressure :   */*   mmHG   Vent. Rate :  46 BPM     Atrial Rate :  46 BPM      P-R Int : 176 ms          QRS Dur :  84 ms       QT Int : " 454 ms       P-R-T Axes :  37   7  49 degrees      QTc Int : 397 ms      Sinus bradycardia   Otherwise normal ECG   When compared with ECG of 08-OCT-2022 19:32, (Unconfirmed)   Sinus rhythm is no longer with 2nd degree AV block   Confirmed by PHIL DONALDSON MD ([id number]) on 10/8/2022 10:19:09 PM      Referred By: ESTELA           Confirmed By: PHIL DONALDSON MD      ECG 12 Lead   Final Result   Test Reason : chest pain   Blood Pressure :   */*   mmHG   Vent. Rate :  55 BPM     Atrial Rate : 107 BPM      P-R Int : 168 ms          QRS Dur :  90 ms       QT Int : 428 ms       P-R-T Axes :  30  -9  39 degrees      QTc Int : 409 ms      Sinus tachycardia with 2nd degree AV block with 2:1 AV conduction   Abnormal ECG   When compared with ECG of 14-SEP-2022 15:09,   Sinus rhythm is now with 2nd degree AV block   Confirmed by PHIL DONALDSON MD [id number]) on 10/8/2022 10:19:19 PM      Referred By: ESTELA           Confirmed By: PHIL DONALDSON MD              HEART Score for Major Cardiac Events - MDCalc  Calculated on Oct 08 2022 8:26 PM  5 points -> Moderate Score (4-6 points) Risk of MACE of 12-16.6%.                             MDM    Final diagnoses:   Atypical chest pain   History of coronary artery disease   Nausea   Shortness of breath   History of gastroesophageal reflux (GERD)   History of hyperlipidemia   Anxiety       ED Disposition  ED Disposition     ED Disposition   Decision to Admit    Condition   --    Comment   Level of Care: Telemetry [5]   Diagnosis: Chest pain [611338]               No follow-up provider specified.       Medication List      No changes were made to your prescriptions during this visit.          Chrissy Sheriff PA-C  10/09/22 0024

## 2022-10-10 ENCOUNTER — APPOINTMENT (OUTPATIENT)
Dept: CT IMAGING | Facility: HOSPITAL | Age: 52
End: 2022-10-10

## 2022-10-10 PROBLEM — F41.9 ANXIETY: Status: ACTIVE | Noted: 2022-10-10

## 2022-10-10 LAB
AMYLASE SERPL-CCNC: 69 U/L (ref 28–100)
BACTERIA UR QL AUTO: NORMAL /HPF
BH CV XLRA MEAS LEFT DIST CCA EDV: -25.9 CM/SEC
BH CV XLRA MEAS LEFT DIST CCA PSV: -99.8 CM/SEC
BH CV XLRA MEAS LEFT DIST ICA EDV: -27 CM/SEC
BH CV XLRA MEAS LEFT DIST ICA PSV: -64.5 CM/SEC
BH CV XLRA MEAS LEFT ICA/CCA RATIO: 0.79
BH CV XLRA MEAS LEFT MID CCA EDV: 29.1 CM/SEC
BH CV XLRA MEAS LEFT MID CCA PSV: 98.2 CM/SEC
BH CV XLRA MEAS LEFT MID ICA EDV: -29.9 CM/SEC
BH CV XLRA MEAS LEFT MID ICA PSV: -77.8 CM/SEC
BH CV XLRA MEAS LEFT PROX CCA EDV: 32.2 CM/SEC
BH CV XLRA MEAS LEFT PROX CCA PSV: 109.2 CM/SEC
BH CV XLRA MEAS LEFT PROX ECA EDV: -26.7 CM/SEC
BH CV XLRA MEAS LEFT PROX ECA PSV: -95.9 CM/SEC
BH CV XLRA MEAS LEFT PROX ICA EDV: 23.6 CM/SEC
BH CV XLRA MEAS LEFT PROX ICA PSV: 65.2 CM/SEC
BH CV XLRA MEAS LEFT PROX SCLA PSV: 118.6 CM/SEC
BH CV XLRA MEAS LEFT VERTEBRAL A EDV: 19.6 CM/SEC
BH CV XLRA MEAS LEFT VERTEBRAL A PSV: 68.4 CM/SEC
BH CV XLRA MEAS RIGHT DIST CCA EDV: -27.5 CM/SEC
BH CV XLRA MEAS RIGHT DIST CCA PSV: -92.7 CM/SEC
BH CV XLRA MEAS RIGHT DIST ICA EDV: -14.5 CM/SEC
BH CV XLRA MEAS RIGHT DIST ICA PSV: -38.5 CM/SEC
BH CV XLRA MEAS RIGHT ICA/CCA RATIO: 0.67
BH CV XLRA MEAS RIGHT MID CCA EDV: 29.1 CM/SEC
BH CV XLRA MEAS RIGHT MID CCA PSV: 95.9 CM/SEC
BH CV XLRA MEAS RIGHT MID ICA EDV: -27.5 CM/SEC
BH CV XLRA MEAS RIGHT MID ICA PSV: -64.4 CM/SEC
BH CV XLRA MEAS RIGHT PROX CCA EDV: 20.4 CM/SEC
BH CV XLRA MEAS RIGHT PROX CCA PSV: 72.3 CM/SEC
BH CV XLRA MEAS RIGHT PROX ECA EDV: -17.3 CM/SEC
BH CV XLRA MEAS RIGHT PROX ECA PSV: -80.9 CM/SEC
BH CV XLRA MEAS RIGHT PROX ICA EDV: -25.1 CM/SEC
BH CV XLRA MEAS RIGHT PROX ICA PSV: -51.9 CM/SEC
BH CV XLRA MEAS RIGHT PROX SCLA PSV: 127.3 CM/SEC
BH CV XLRA MEAS RIGHT VERTEBRAL A EDV: 14.1 CM/SEC
BH CV XLRA MEAS RIGHT VERTEBRAL A PSV: 44 CM/SEC
BILIRUB UR QL STRIP: NEGATIVE
CLARITY UR: ABNORMAL
COLOR UR: YELLOW
GLUCOSE UR STRIP-MCNC: NEGATIVE MG/DL
HGB UR QL STRIP.AUTO: NEGATIVE
HYALINE CASTS UR QL AUTO: NORMAL /LPF
KETONES UR QL STRIP: NEGATIVE
LEUKOCYTE ESTERASE UR QL STRIP.AUTO: NEGATIVE
LIPASE SERPL-CCNC: 28 U/L (ref 13–60)
MAXIMAL PREDICTED HEART RATE: 166 BPM
NITRITE UR QL STRIP: NEGATIVE
PH UR STRIP.AUTO: <=5 [PH] (ref 5–8)
PROT UR QL STRIP: NEGATIVE
QT INTERVAL: 450 MS
QTC INTERVAL: 460 MS
RBC # UR STRIP: NORMAL /HPF
REF LAB TEST METHOD: NORMAL
SP GR UR STRIP: 1.01 (ref 1–1.03)
SQUAMOUS #/AREA URNS HPF: NORMAL /HPF
STRESS TARGET HR: 141 BPM
UROBILINOGEN UR QL STRIP: ABNORMAL
WBC # UR STRIP: NORMAL /HPF

## 2022-10-10 PROCEDURE — 74178 CT ABD&PLV WO CNTR FLWD CNTR: CPT

## 2022-10-10 PROCEDURE — 0 DIATRIZOATE MEGLUMINE & SODIUM PER 1 ML

## 2022-10-10 PROCEDURE — 25010000002 IOPAMIDOL 61 % SOLUTION: Performed by: HOSPITALIST

## 2022-10-10 PROCEDURE — 81001 URINALYSIS AUTO W/SCOPE: CPT | Performed by: PHYSICIAN ASSISTANT

## 2022-10-10 PROCEDURE — G0378 HOSPITAL OBSERVATION PER HR: HCPCS

## 2022-10-10 PROCEDURE — 96361 HYDRATE IV INFUSION ADD-ON: CPT

## 2022-10-10 PROCEDURE — 25010000002 ONDANSETRON PER 1 MG: Performed by: NURSE PRACTITIONER

## 2022-10-10 PROCEDURE — 96372 THER/PROPH/DIAG INJ SC/IM: CPT

## 2022-10-10 PROCEDURE — 96375 TX/PRO/DX INJ NEW DRUG ADDON: CPT

## 2022-10-10 PROCEDURE — 25010000002 ENOXAPARIN PER 10 MG: Performed by: NURSE PRACTITIONER

## 2022-10-10 PROCEDURE — 99225 PR SBSQ OBSERVATION CARE/DAY 25 MINUTES: CPT | Performed by: HOSPITALIST

## 2022-10-10 RX ORDER — HYDROXYZINE HYDROCHLORIDE 25 MG/1
25 TABLET, FILM COATED ORAL EVERY 4 HOURS PRN
Status: DISCONTINUED | OUTPATIENT
Start: 2022-10-10 | End: 2022-10-11 | Stop reason: HOSPADM

## 2022-10-10 RX ADMIN — DIATRIZOATE MEGLUMINE AND DIATRIZOATE SODIUM 15 ML: 660; 100 LIQUID ORAL; RECTAL at 11:36

## 2022-10-10 RX ADMIN — Medication 10 ML: at 21:13

## 2022-10-10 RX ADMIN — SODIUM CHLORIDE 75 ML/HR: 9 INJECTION, SOLUTION INTRAVENOUS at 00:54

## 2022-10-10 RX ADMIN — HYDROXYZINE HYDROCHLORIDE 25 MG: 25 TABLET ORAL at 21:12

## 2022-10-10 RX ADMIN — IOPAMIDOL 85 ML: 612 INJECTION, SOLUTION INTRAVENOUS at 14:17

## 2022-10-10 RX ADMIN — ISOSORBIDE MONONITRATE 30 MG: 30 TABLET, EXTENDED RELEASE ORAL at 08:23

## 2022-10-10 RX ADMIN — PANTOPRAZOLE SODIUM 40 MG: 40 TABLET, DELAYED RELEASE ORAL at 05:44

## 2022-10-10 RX ADMIN — ONDANSETRON 4 MG: 2 INJECTION INTRAMUSCULAR; INTRAVENOUS at 03:38

## 2022-10-10 RX ADMIN — PRASUGREL 10 MG: 10 TABLET, FILM COATED ORAL at 08:23

## 2022-10-10 RX ADMIN — ALLOPURINOL 50 MG: 100 TABLET ORAL at 08:23

## 2022-10-10 RX ADMIN — ENOXAPARIN SODIUM 40 MG: 40 INJECTION SUBCUTANEOUS at 08:24

## 2022-10-10 RX ADMIN — NITROGLYCERIN 0.4 MG: 0.4 TABLET SUBLINGUAL at 02:12

## 2022-10-10 RX ADMIN — ASPIRIN 81 MG: 81 TABLET, COATED ORAL at 08:24

## 2022-10-10 RX ADMIN — ROSUVASTATIN CALCIUM 40 MG: 20 TABLET, FILM COATED ORAL at 08:24

## 2022-10-10 RX ADMIN — Medication 10 ML: at 08:29

## 2022-10-10 NOTE — PROGRESS NOTES
" [facility] - Progress Note    Geovanny Barnett  1970  [id number]    10/10/22, 09:07 EDT      Chief Complaint: Following for chest pain, CAD, abdominal pain    Subjective:   Continues to have chest pain, abdominal discomfort.  No EKG changes overnight. Vitals remain stable, maintaining NSR.      Review of Systems:  Pertinent positives are listed above and in physical exam.  All others have been reviewed and are negative.    allopurinol, 50 mg, Oral, Daily  aspirin, 81 mg, Oral, Daily  enoxaparin, 40 mg, Subcutaneous, Daily  isosorbide mononitrate, 30 mg, Oral, Q24H  pantoprazole, 40 mg, Oral, QAM  prasugrel, 10 mg, Oral, Daily  rosuvastatin, 40 mg, Oral, Daily  sodium chloride, 10 mL, Intravenous, Q12H        Objective:  Vitals:   height is 167 cm (65.75\") and weight is 90 kg (198 lb 6.6 oz). His oral temperature is 98.2 °F (36.8 °C). His blood pressure is 116/75 and his pulse is 52. His respiration is 14 and oxygen saturation is 96%.       Intake/Output Summary (Last 24 hours) at 10/10/2022 0907  Last data filed at 10/9/2022 1812  Gross per 24 hour   Intake 1500 ml   Output --   Net 1500 ml       Physical Exam:  General:  WN, NAD, A and O x3.  CV:  Normal S1,S2. No murmur, rub, or gallop.  Resp:  CTA Jonatan, equal, nonlabored.  Abd:  Soft, + BS, no organomegaly. Nontender to palpation.  Extrem:  No edema BLE, 2+ pedal/PT pulses.            Results from last 7 days   Lab Units 10/09/22  0801   WBC 10*3/mm3 7.68   HEMOGLOBIN g/dL 14.6   HEMATOCRIT % 42.9   PLATELETS 10*3/mm3 185     Results from last 7 days   Lab Units 10/09/22  0801 10/08/22  1943   SODIUM mmol/L 139 139   POTASSIUM mmol/L 3.9 4.1   CHLORIDE mmol/L 106 104   CO2 mmol/L 21.0* 24.0   BUN mg/dL 16 16   CREATININE mg/dL 1.00 1.18   CALCIUM mg/dL 9.6 9.7   BILIRUBIN mg/dL  --  0.6   ALK PHOS U/L  --  80   ALT (SGPT) U/L  --  38   AST (SGOT) U/L  --  29   GLUCOSE mg/dL 113* 86         Results from last 7 days   Lab Units 10/08/22  1943   TSH " uIU/mL 1.550         Results from last 7 days   Lab Units 10/08/22  1943   PROBNP pg/mL 15.1       Tele:  NSR    Assessment:  -  CAD with recent stent to LAD September 2022 with recurrent chest pain.  -  Bradycardia  -  HLP  -  Solitary Kidney, congenital  -  GERD  -  Abdominal pain      Plan:  - Serial troponins negative, no acute EKG changes with chest pain events.  -  Continue ASA 81mg daily, Effient 10mg daily, Crestor 40mg QHS.  Imdur 30 mg started, continue  -  Recent GI evaluation not concerning for reflux issues.  Continue PPI.  Obtain CT Abd/Pelvis with and without contrast today.  Check amylase/lipase, check urine.      I discussed the patient's findings and my recommendations with the patient, any present family members, and the nursing staff.  Priyank Kam MD saw and examined patient, verified hx and PE, read all radiographic studies, reviewed labs and micro data, and formulated dx, plan for treatment and all medical decision making.      Ayesha Mohr PA-C  10/10/22, 09:07 EDT

## 2022-10-10 NOTE — PLAN OF CARE
Problem: Adult Inpatient Plan of Care  Goal: Plan of Care Review  Outcome: Ongoing, Progressing  Flowsheets (Taken 10/10/2022 0155)  Progress: no change  Goal: Patient-Specific Goal (Individualized)  Outcome: Ongoing, Progressing  Goal: Absence of Hospital-Acquired Illness or Injury  Outcome: Ongoing, Progressing  Intervention: Identify and Manage Fall Risk  Recent Flowsheet Documentation  Taken 10/9/2022 2000 by Tatum Carcamo, RN  Safety Promotion/Fall Prevention:   fall prevention program maintained   safety round/check completed  Intervention: Prevent Skin Injury  Recent Flowsheet Documentation  Taken 10/9/2022 2000 by Tatum Carcamo, RN  Body Position: position changed independently  Intervention: Prevent and Manage VTE (Venous Thromboembolism) Risk  Recent Flowsheet Documentation  Taken 10/9/2022 2000 by Tatum Carcamo, RN  Activity Management: activity adjusted per tolerance  Intervention: Prevent Infection  Recent Flowsheet Documentation  Taken 10/9/2022 2000 by Tatum Carcamo, RN  Infection Prevention: rest/sleep promoted  Goal: Optimal Comfort and Wellbeing  Outcome: Ongoing, Progressing  Goal: Readiness for Transition of Care  Outcome: Ongoing, Progressing   Goal Outcome Evaluation:           Progress: no change

## 2022-10-10 NOTE — PROGRESS NOTES
[facility] Medicine Services  PROGRESS NOTE    Patient Name: Geovanny Barnett  : 1970  MRN: [id number]    Date of Admission: 10/8/2022  Primary Care Physician: Champ Feng MD    Subjective   Subjective     CC:   Chest pain    HPI:   Was nauseous and feeling like wanted to throw up overnight.  Discussed discharge planning.  Getting CT abdomen/pelvis today.  Asking for anxiety medication today.    ROS:    Gen- no chills, no fevers  CV- No chest pain, palpitations  Resp- No cough, dyspnea  GI- No abd pain, nausea overnight       Objective   Objective     Vital Signs:   Temp:  [97.7 °F (36.5 °C)-98.5 °F (36.9 °C)] 97.7 °F (36.5 °C)  Heart Rate:  [47-82] 64  Resp:  [14-16] 14  BP: ()/(62-93) 110/93     Physical Exam:    Constitutional: No acute distress, awake, alert  HENT: NCAT, mucous membranes moist  Respiratory: Clear to auscultation bilaterally, respiratory effort normal   Cardiovascular: RRR, s1 and s2  Gastrointestinal: Positive bowel sounds, soft, nontender, nondistended  Musculoskeletal: No bilateral ankle edema  Psychiatric: Appropriate affect, cooperative  Neurologic: Oriented x 3, strength symmetric in all extremities, Cranial Nerves grossly intact to confrontation, speech clear  Skin: dry, + skin tenting    Results Reviewed:  LAB RESULTS:      Lab 10/09/22  0801 10/08/22  1943   WBC 7.68 9.68   HEMOGLOBIN 14.6 15.2   HEMATOCRIT 42.9 44.7   PLATELETS 185 221   NEUTROS ABS 4.54 6.06   IMMATURE GRANS (ABS) 0.02 0.02   LYMPHS ABS 2.44 2.90   MONOS ABS 0.49 0.54   EOS ABS 0.14 0.13   MCV 90.5 90.5         Lab 10/09/22  0801 10/08/22  1943   SODIUM 139 139   POTASSIUM 3.9 4.1   CHLORIDE 106 104   CO2 21.0* 24.0   ANION GAP 12.0 11.0   BUN 16 16   CREATININE 1.00 1.18   EGFR 89.4 73.3   GLUCOSE 113* 86   CALCIUM 9.6 9.7   MAGNESIUM 2.0  --    HEMOGLOBIN A1C 6.10*  --    TSH  --  1.550         Lab 10/09/22  0801 10/08/22  1943   TOTAL PROTEIN  --  8.0   ALBUMIN  --   4.90   GLOBULIN  --  3.1   ALT (SGPT)  --  38   AST (SGOT)  --  29   BILIRUBIN  --  0.6   ALK PHOS  --  80   AMYLASE 69  --    LIPASE 28 38         Lab 10/09/22  0801 10/08/22  2151 10/08/22  1943   PROBNP  --   --  15.1   TROPONIN T <0.010 <0.010 <0.010                 Brief Urine Lab Results  (Last result in the past 365 days)      Color   Clarity   Blood   Leuk Est   Nitrite   Protein   CREAT   Urine HCG        10/10/22 0956 Yellow   Cloudy   Negative   Negative   Negative   Negative                 Microbiology Results Abnormal     None          CT Abdomen Pelvis With & Without Contrast    Result Date: 10/10/2022  DATE OF EXAM: 10/10/2022 2:05 PM  PROCEDURE: CT ABDOMEN PELVIS WITH AND WITHOUT CONTRAST-  INDICATIONS: Abdominal pain, acute, nonlocalized; R07.89-Other chest pain; Z86.79-Personal history of other diseases of the circulatory system; R11.0-Nausea; R06.02-Shortness of breath; Z87.19-Personal history of other diseases of the digestive system; Z86.39-Personal history of other endocrine, nutritional and metabolic disease; F41.9-Anxiety disorder, unspecified  COMPARISON: Abdomen and pelvis CT scan 12/22/2021.  TECHNIQUE: Routine transaxial slices were obtained through the abdomen and pelvis without the administration of intravenous contrast. Additional scanning through the abdomen and pelvis followed by the intravenous administration of 85 mL of Isovue 300. Reconstructed coronal and sagittal images were also obtained. Automated exposure control and iterative construction methods were used.  The radiation dose reduction device was turned on for each scan per the ALARA (As Low as Reasonably Achievable) protocol.  FINDINGS: History indicates generalized abdominal pain. The previous 12/22/2021 exam report indicated stable pelvic soft tissue masses, of uncertain significance but likely benign.  Today's study shows the included lower lungs to appear grossly clear. Unenhanced images show right nephrectomy, and  previous lateral left renal surgery. Left kidney appears stable, overall hyperplastic with no evidence of obstructive uropathy. Post contrast images show minimal left lateral renal cortical scarring at the site where there appears to be either a suture line or thin capsular calcification. No discrete renal mass is seen here or elsewhere. There is fatty liver change but no evidence of hepatic mass. Spleen is not enlarged. No significant abnormalities are seen of the gallbladder, pancreas, or adrenal glands. No upper abdominal adenopathy, ascites or acute inflammatory focus is appreciated. Large and small bowel loops are normal in caliber and appearance.  Regarding the lower abdomen and pelvis, terminal ileum, cecum and appendix appear normal. Sharply defined soft tissue density nodule in the right upper pelvis appears visually stable, minimally larger in size than on the prior study where it measured approximately 27 x 30 mm, today approximately 32 x 30 mm. The more inferior, right lateral pelvic nodule measured at similar locations also appears minimally increased from 28 x 19 mm to approximately 30 x 19 mm. Nodular soft tissue density extends inferiorly along the expected course of the right ureter to the level of the right UVJ, and this appears to represent mass along the expected course of the right ureter. With history of congenital absence of the right kidney, rather than resection, this appears to represent residual soft tissue developmental anomaly, rather than tumor. No invasive features are appreciated. This area was present but nonhypermetabolic on the 12/05/2019 PET CT scan.  The bladder itself is partly obscured by streak artifact from patient's hip prostheses. No gross abnormalities are seen here or elsewhere in the pelvis. No inflammatory change or ascites is seen.  Delayed venous phase images show no evidence of left-sided obstructive uropathy. Bony structures appear to be intact.        Impression:   1. No evidence of acute intra-abdominal or intrapelvic disease. In particular, no evidence of bowel obstruction, obstructive uropathy, or acute inflammatory focus. Left renal cortical scarring again noted at site of previously removed lesion noted in 2019. 2. Absent right kidney, by history congenital. Elongated tumor-like mass occupying the expected course of the distal one-half of the right ureter, multilobular, and minimally changed from prior exam. No activity on 2019 PET scan. 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 3. No new or progressive intra-abdominal or intrapelvic disease is appreciated elsewhere.      XR Chest 1 View    Result Date: 10/8/2022  EXAMINATION: XR CHEST 1 VW DATE: 10/8/2022 7:55 PM INDICATION:  Chest pain; COMPARISON:  September 13, 2022 FINDINGS: No focal consolidation, pleural effusion, or pneumothorax. Cardiomediastinal silhouette  unremarkable. No acute osseous abnormality.     Impression: 1.  No acute cardiopulmonary process. Electronically signed by:  Ant Saucedo M.D.  10/8/2022 7:13 PM Mountain Time    Duplex Carotid Ultrasound CAR    Result Date: 10/10/2022  •  Right internal carotid artery stenosis of 0-49%. •  Left internal carotid artery stenosis of 0-49%.       Results for orders placed during the hospital encounter of 09/13/22    Adult Transthoracic Echo Complete W/ Cont if Necessary Per Protocol    Interpretation Summary  · Left ventricular ejection fraction appears to be 51 - 55%.  · Estimated right ventricular systolic pressure from tricuspid regurgitation is normal (<35 mmHg). Calculated right ventricular systolic pressure from tricuspid regurgitation is 20 mmHg.      I have reviewed the medications:  Scheduled Meds:allopurinol, 50 mg, Oral, Daily  aspirin, 81 mg, Oral, Daily  enoxaparin, 40 mg, Subcutaneous, Daily  isosorbide mononitrate, 30 mg, Oral, Q24H  pantoprazole, 40 mg,  Oral, QAM  prasugrel, 10 mg, Oral, Daily  rosuvastatin, 40 mg, Oral, Daily  sodium chloride, 10 mL, Intravenous, Q12H      Continuous Infusions:   PRN Meds:.•  acetaminophen **OR** acetaminophen **OR** acetaminophen  •  nitroglycerin  •  ondansetron  •  sodium chloride  •  sodium chloride    Assessment & Plan   Assessment & Plan     Active Hospital Problems    Diagnosis  POA   • Symptomatic bradycardia [R00.1]  Yes   • History of prediabetes [Z87.898]  Yes   • Dehydration [E86.0]  Yes   • Chest pain [R07.9]  Yes   • Hyperlipemia [E78.5]  Yes   • Solitary kidney, congenital [Q60.0]  Not Applicable   • GERD (gastroesophageal reflux disease) [K21.9]  Yes   • Coronary artery disease involving native heart with unstable angina pectoris (HCC) [I25.110]  Yes      Resolved Hospital Problems   No resolved problems to display.        Brief Hospital Course to date:  Geovanny Barnett is a 52 y.o. male with history of CAD, hyperlipidemia, GERD, solitary kidney, gout, who presented to the ER with Chest Pain.       Chest pain  Symptomatic bradycardia  CAD  - Hold metoprolol  - Continue daily ASA  - Continue Effient, recent cardiac stent to LAD 9/14/2022  -- CT abdomen / Pelvis today     Anxiety  -- trial of atarax    Dyslipidemia  - Crestor 40 mg daily     Headache  Dizziness  - Carotid duplex ok and reviewed with patient     GERD  - Continue PPI     Gout  - Continue allopurinol    Expected Discharge Location and Transportation:  home  Expected Discharge Date:  10/11    DVT prophylaxis:  Medical DVT prophylaxis orders are present.     AM-PAC 6 Clicks Score (PT): 24 (10/10/22 4950)    CODE STATUS:   Code Status and Medical Interventions:   Ordered at: 10/09/22 0039     Code Status (Patient has no pulse and is not breathing):    CPR (Attempt to Resuscitate)     Medical Interventions (Patient has pulse or is breathing):    Full Support       Bradley Maxwell MD  10/10/22

## 2022-10-10 NOTE — SIGNIFICANT NOTE
Called MD to notify that the pt called out with c/o tightness in the chest. Pt does not   endorse any chest pain or difficulty breathing. Pt is awake and a&o x's4 resting in bed and other vitals are stable.Will continue to monitor.

## 2022-10-10 NOTE — CASE MANAGEMENT/SOCIAL WORK
Discharge Planning Assessment  [facility]     Patient Name: Geovanny Barnett  MRN: [id number]  Today's Date: 10/10/2022    Admit Date: 10/8/2022    Plan: Home   Discharge Needs Assessment     Row Name 10/10/22 1332       Living Environment    People in Home alone    Current Living Arrangements home    Living Arrangement Comments LIves in [facility] alone in a home.       Resource/Environmental Concerns    Resource/Environmental Concerns none       Transition Planning    Patient/Family Anticipates Transition to home with family    Transportation Anticipated family or friend will provide       Discharge Needs Assessment    Equipment Currently Used at Home none               Discharge Plan     Row Name 10/10/22 1334       Plan    Plan Home    Patient/Family in Agreement with Plan yes    Plan Comments Spoke w/pt, plan is home @ d/c. Insurance confirmed. Fills scripts @ [facility] in [place]. Not current w/HH or uses DME. Has transportation. Awaiting CT scan. No d/c needs verbalized @ this time.    Final Discharge Disposition Code 01 - home or self-care    Row Name 10/10/22 9254       Plan    Plan Home    Patient/Family in Agreement with Plan yes    Plan Comments Spoke w/pt in room plan is home @ d/c. Insurance confirmed.    Final Discharge Disposition Code 01 - home or self-care              Continued Care and Services - Admitted Since 10/8/2022    Coordination has not been started for this encounter.       Expected Discharge Date and Time     Expected Discharge Date Expected Discharge Time    Oct 11, 2022          Demographic Summary     Row Name 10/10/22 1331       General Information    Admission Type observation    Arrived From home    Referral Source emergency department    General Information Comments No POA or LW paperwork. PCP is Champ Feng               Functional Status     Row Name 10/10/22 1332       Functional Status    Usual Activity Tolerance good       Functional Status, IADL    Medications  independent    Meal Preparation independent    Housekeeping independent    Laundry independent    Shopping independent               Psychosocial    No documentation.                Abuse/Neglect    No documentation.                Legal    No documentation.                Substance Abuse    No documentation.                Patient Forms    No documentation.                   Shamir Gunn RN

## 2022-10-10 NOTE — SIGNIFICANT NOTE
Called MD to notify that the pt has be having several runs of sinus domi with   h/r 's in the lower 40's. No new orders given at this time. Pt is asymptomatic and awake resting in bed. Will continue to monitor

## 2022-10-10 NOTE — NURSING NOTE
Called MD to notify of EKG reading. Per MD continue to monitor and if pt sustains below 40 to give a call.

## 2022-10-11 VITALS
HEART RATE: 55 BPM | OXYGEN SATURATION: 91 % | DIASTOLIC BLOOD PRESSURE: 57 MMHG | SYSTOLIC BLOOD PRESSURE: 99 MMHG | BODY MASS INDEX: 31.89 KG/M2 | WEIGHT: 198.41 LBS | TEMPERATURE: 97 F | RESPIRATION RATE: 14 BRPM | HEIGHT: 66 IN

## 2022-10-11 PROCEDURE — 96372 THER/PROPH/DIAG INJ SC/IM: CPT

## 2022-10-11 PROCEDURE — 25010000002 ENOXAPARIN PER 10 MG: Performed by: NURSE PRACTITIONER

## 2022-10-11 PROCEDURE — 99217 PR OBSERVATION CARE DISCHARGE MANAGEMENT: CPT | Performed by: HOSPITALIST

## 2022-10-11 PROCEDURE — G0378 HOSPITAL OBSERVATION PER HR: HCPCS

## 2022-10-11 RX ORDER — HYDROXYZINE HYDROCHLORIDE 25 MG/1
25 TABLET, FILM COATED ORAL EVERY 6 HOURS PRN
Qty: 120 TABLET | Refills: 0 | Status: SHIPPED | OUTPATIENT
Start: 2022-10-11 | End: 2022-11-10

## 2022-10-11 RX ORDER — ISOSORBIDE MONONITRATE 30 MG/1
30 TABLET, EXTENDED RELEASE ORAL
Qty: 30 TABLET | Refills: 11 | Status: ON HOLD | OUTPATIENT
Start: 2022-10-12 | End: 2023-01-24

## 2022-10-11 RX ADMIN — PRASUGREL 10 MG: 10 TABLET, FILM COATED ORAL at 08:37

## 2022-10-11 RX ADMIN — Medication 10 ML: at 08:38

## 2022-10-11 RX ADMIN — ALLOPURINOL 50 MG: 100 TABLET ORAL at 08:36

## 2022-10-11 RX ADMIN — ISOSORBIDE MONONITRATE 30 MG: 30 TABLET, EXTENDED RELEASE ORAL at 08:37

## 2022-10-11 RX ADMIN — ROSUVASTATIN CALCIUM 40 MG: 20 TABLET, FILM COATED ORAL at 08:37

## 2022-10-11 RX ADMIN — ENOXAPARIN SODIUM 40 MG: 40 INJECTION SUBCUTANEOUS at 08:36

## 2022-10-11 RX ADMIN — PANTOPRAZOLE SODIUM 40 MG: 40 TABLET, DELAYED RELEASE ORAL at 06:23

## 2022-10-11 RX ADMIN — ASPIRIN 81 MG: 81 TABLET, COATED ORAL at 08:37

## 2022-10-11 NOTE — DISCHARGE SUMMARY
[facility] Medicine Services  DISCHARGE SUMMARY    Patient Name: Geovanny Barnett  : 1970  MRN: [id number]    Date of Admission: 10/8/2022  7:46 PM  Date of Discharge:  10/11/2022 (Tuesday)  Primary Care Physician: Champ Feng MD    Consults     Date and Time Order Name Status Description    10/9/2022 12:39 AM Inpatient Cardiology Consult Completed     2022 12:34 AM Inpatient Cardiology Consult Completed         Priyank Kam MD - Cardiology    Hospital Course     Presenting Problem:   Chest pain [R07.9]    Active Hospital Problems    Diagnosis  POA   • Anxiety [F41.9]  Yes   • Symptomatic bradycardia [R00.1]  Yes   • History of prediabetes [Z87.898]  Yes   • Dehydration [E86.0]  Yes   • Chest pain [R07.9]  Yes   • Hyperlipemia [E78.5]  Yes   • Solitary kidney, congenital [Q60.0]  Not Applicable   • GERD (gastroesophageal reflux disease) [K21.9]  Yes   • Coronary artery disease involving native heart with unstable angina pectoris (HCC) [I25.110]  Yes      Resolved Hospital Problems   No resolved problems to display.          Hospital Course:  Geovanny Barnett is a 52 y.o. male with history of CAD, hyperlipidemia, GERD, solitary kidney, gout, who presented to the ER with Chest Pain.        Chest pain  Symptomatic bradycardia  CAD  - Hold metoprolol  - Continue daily ASA  - Continue Effient, recent cardiac stent to LAD 2022  -- CT abdomen / Pelvis - no explanation of symptoms     Anxiety  -- atarax 25 mg TID as needed     Dyslipidemia  - Crestor 40 mg daily     Headache  Dizziness  - Carotid duplex ok and reviewed with patient     GERD  - got Protonix while here     Gout  - Continue allopurinol    Discharge Follow Up Recommendations for outpatient labs/diagnostics:   follow up with Dr. Kam as recommended    Day of Discharge     HPI:   No chest pain.   No shortness of breath.  Asking if he can have prescription for Atarax    Review of Systems    Gen- No  fevers, chills  CV- No chest pain, palpitations  Resp- No cough, dyspnea  GI- No N/V/D, abd pain    Vital Signs:   Temp:  [97 °F (36.1 °C)-98.8 °F (37.1 °C)] 97 °F (36.1 °C)  Heart Rate:  [51-90] 55  Resp:  [14-16] 14  BP: ()/(57-93) 99/57    Physical Exam:    Constitutional: No acute distress, awake, alert  HENT: NCAT, mucous membranes moist  Respiratory: Clear to auscultation bilaterally, respiratory effort normal   Cardiovascular: RRR, no murmurs, rubs, or gallops  Gastrointestinal: Positive bowel sounds, soft, nontender, nondistended  Musculoskeletal: No bilateral ankle edema  Skin: No rashes    Pertinent  and/or Most Recent Results     LAB RESULTS:      Lab 10/09/22  0801 10/08/22  1943   WBC 7.68 9.68   HEMOGLOBIN 14.6 15.2   HEMATOCRIT 42.9 44.7   PLATELETS 185 221   NEUTROS ABS 4.54 6.06   IMMATURE GRANS (ABS) 0.02 0.02   LYMPHS ABS 2.44 2.90   MONOS ABS 0.49 0.54   EOS ABS 0.14 0.13   MCV 90.5 90.5         Lab 10/09/22  0801 10/08/22  1943   SODIUM 139 139   POTASSIUM 3.9 4.1   CHLORIDE 106 104   CO2 21.0* 24.0   ANION GAP 12.0 11.0   BUN 16 16   CREATININE 1.00 1.18   EGFR 89.4 73.3   GLUCOSE 113* 86   CALCIUM 9.6 9.7   MAGNESIUM 2.0  --    HEMOGLOBIN A1C 6.10*  --    TSH  --  1.550         Lab 10/09/22  0801 10/08/22  1943   TOTAL PROTEIN  --  8.0   ALBUMIN  --  4.90   GLOBULIN  --  3.1   ALT (SGPT)  --  38   AST (SGOT)  --  29   BILIRUBIN  --  0.6   ALK PHOS  --  80   AMYLASE 69  --    LIPASE 28 38         Lab 10/09/22  0801 10/08/22  2151 10/08/22  1943   PROBNP  --   --  15.1   TROPONIN T <0.010 <0.010 <0.010                 Brief Urine Lab Results  (Last result in the past 365 days)      Color   Clarity   Blood   Leuk Est   Nitrite   Protein   CREAT   Urine HCG        10/10/22 0956 Yellow   Cloudy   Negative   Negative   Negative   Negative               Microbiology Results (last 10 days)     ** No results found for the last 240 hours. **          CT Abdomen Pelvis With & Without  Contrast    Result Date: 10/10/2022  DATE OF EXAM: 10/10/2022 2:05 PM  PROCEDURE: CT ABDOMEN PELVIS WITH AND WITHOUT CONTRAST-  INDICATIONS: Abdominal pain, acute, nonlocalized; R07.89-Other chest pain; Z86.79-Personal history of other diseases of the circulatory system; R11.0-Nausea; R06.02-Shortness of breath; Z87.19-Personal history of other diseases of the digestive system; Z86.39-Personal history of other endocrine, nutritional and metabolic disease; F41.9-Anxiety disorder, unspecified  COMPARISON: Abdomen and pelvis CT scan 12/22/2021.  TECHNIQUE: Routine transaxial slices were obtained through the abdomen and pelvis without the administration of intravenous contrast. Additional scanning through the abdomen and pelvis followed by the intravenous administration of 85 mL of Isovue 300. Reconstructed coronal and sagittal images were also obtained. Automated exposure control and iterative construction methods were used.  The radiation dose reduction device was turned on for each scan per the ALARA (As Low as Reasonably Achievable) protocol.  FINDINGS: History indicates generalized abdominal pain. The previous 12/22/2021 exam report indicated stable pelvic soft tissue masses, of uncertain significance but likely benign.  Today's study shows the included lower lungs to appear grossly clear. Unenhanced images show right nephrectomy, and previous lateral left renal surgery. Left kidney appears stable, overall hyperplastic with no evidence of obstructive uropathy. Post contrast images show minimal left lateral renal cortical scarring at the site where there appears to be either a suture line or thin capsular calcification. No discrete renal mass is seen here or elsewhere. There is fatty liver change but no evidence of hepatic mass. Spleen is not enlarged. No significant abnormalities are seen of the gallbladder, pancreas, or adrenal glands. No upper abdominal adenopathy, ascites or acute inflammatory focus is  appreciated. Large and small bowel loops are normal in caliber and appearance.  Regarding the lower abdomen and pelvis, terminal ileum, cecum and appendix appear normal. Sharply defined soft tissue density nodule in the right upper pelvis appears visually stable, minimally larger in size than on the prior study where it measured approximately 27 x 30 mm, today approximately 32 x 30 mm. The more inferior, right lateral pelvic nodule measured at similar locations also appears minimally increased from 28 x 19 mm to approximately 30 x 19 mm. Nodular soft tissue density extends inferiorly along the expected course of the right ureter to the level of the right UVJ, and this appears to represent mass along the expected course of the right ureter. With history of congenital absence of the right kidney, rather than resection, this appears to represent residual soft tissue developmental anomaly, rather than tumor. No invasive features are appreciated. This area was present but nonhypermetabolic on the 12/05/2019 PET CT scan.  The bladder itself is partly obscured by streak artifact from patient's hip prostheses. No gross abnormalities are seen here or elsewhere in the pelvis. No inflammatory change or ascites is seen.  Delayed venous phase images show no evidence of left-sided obstructive uropathy. Bony structures appear to be intact.         1. No evidence of acute intra-abdominal or intrapelvic disease. In particular, no evidence of bowel obstruction, obstructive uropathy, or acute inflammatory focus. Left renal cortical scarring again noted at site of previously removed lesion noted in 2019. 2. Absent right kidney, by history congenital. Elongated tumor-like mass occupying the expected course of the distal one-half of the right ureter, multilobular, and minimally changed from prior exam. No activity on 2019 PET scan. This presumably represents residual developmental anomaly from failure of development of the right kidney,  apparently congenital. 3. No new or progressive intra-abdominal or intrapelvic disease is appreciated elsewhere.      XR Chest 1 View    Result Date: 10/8/2022  EXAMINATION: XR CHEST 1 VW DATE: 10/8/2022 7:55 PM INDICATION:  Chest pain; COMPARISON:  September 13, 2022 FINDINGS: No focal consolidation, pleural effusion, or pneumothorax. Cardiomediastinal silhouette  unremarkable. No acute osseous abnormality.     1.  No acute cardiopulmonary process. Electronically signed by:  Ant Saucedo M.D.  10/8/2022 7:13 PM Mountain Time    Duplex Carotid Ultrasound CAR    Result Date: 10/10/2022  •  Right internal carotid artery stenosis of 0-49%. •  Left internal carotid artery stenosis of 0-49%.       Results for orders placed during the hospital encounter of 10/08/22    Duplex Carotid Ultrasound CAR    Interpretation Summary  •  Right internal carotid artery stenosis of 0-49%.  •  Left internal carotid artery stenosis of 0-49%.      Results for orders placed during the hospital encounter of 10/08/22    Duplex Carotid Ultrasound CAR    Interpretation Summary  •  Right internal carotid artery stenosis of 0-49%.  •  Left internal carotid artery stenosis of 0-49%.      Results for orders placed during the hospital encounter of 09/13/22    Adult Transthoracic Echo Complete W/ Cont if Necessary Per Protocol    Interpretation Summary  · Left ventricular ejection fraction appears to be 51 - 55%.  · Estimated right ventricular systolic pressure from tricuspid regurgitation is normal (<35 mmHg). Calculated right ventricular systolic pressure from tricuspid regurgitation is 20 mmHg.      Discharge Details        Discharge Medications      New Medications      Instructions Start Date   hydrOXYzine 25 MG tablet  Commonly known as: ATARAX   25 mg, Oral, Every 6 Hours PRN      isosorbide mononitrate 30 MG 24 hr tablet  Commonly known as: IMDUR   30 mg, Oral, Every 24 Hours Scheduled   Start Date: October 12, 2022        Changes to  Medications      Instructions Start Date   famotidine 20 MG tablet  Commonly known as: PEPCID  What changed: when to take this   20 mg, Oral, 2 Times Daily         Continue These Medications      Instructions Start Date   ALLOPURINOL PO   300 mg, Oral      aspirin 81 MG EC tablet   81 mg, Oral, Daily      Crestor 40 MG tablet  Generic drug: rosuvastatin   40 mg, Oral, Daily      nitroglycerin 0.4 MG SL tablet  Commonly known as: NITROSTAT   1 under the tongue as needed for angina, may repeat q5mins for up three doses      omeprazole 20 MG capsule  Commonly known as: priLOSEC   20 mg, Oral, Daily      prasugrel 10 MG tablet  Commonly known as: EFFIENT   10 mg, Oral, Daily           No Known Allergies    Discharge Disposition:  Home or Self Care    Diet:  Hospital:  Diet Order   Procedures   • Diet Regular; Thin; Cardiac, Consistent Carbohydrate     Activity:  As tolerated    Restrictions or Other Recommendations:   Follow up as recommended by Cardiology.  Continue taking aspirin/Effient.  He is asking for new prescription for anxiety medication at discharge today.   Will need primary care follow up to re-assess this diagnosis       CODE STATUS:    Code Status and Medical Interventions:   Ordered at: 10/09/22 0039     Code Status (Patient has no pulse and is not breathing):    CPR (Attempt to Resuscitate)     Medical Interventions (Patient has pulse or is breathing):    Full Support     No future appointments.    Additional Instructions for the Follow-ups that You Need to Schedule     Discharge Follow-up with PCP   As directed       Currently Documented PCP:    Champ Feng MD    PCP Phone Number:    [phone]     Follow Up Details: Primary Care Doctor - 1 week - anxiety         Discharge Follow-up with Specialty: Cardiology   As directed      Specialty: Cardiology    Follow Up Details: follow up Cardiology as recommended by ANDREW Delarosa MD  10/11/22      Time Spent on  Discharge:  I spent  46  minutes on this discharge activity which included: face-to-face encounter with the patient, reviewing the data in the system, coordination of the care with the nursing staff as well as consultants, documentation, and entering orders.

## 2022-10-11 NOTE — PROGRESS NOTES
" [facility] - Progress Note    Geovanny Barnett  1970  [id number]    10/11/22, 10:06 EDT      Chief Complaint: Following for chest pain, CAD, abdominal pain    Subjective:   No chest pain in last 24 hours.  Awake in bed, vitals stable, 96% O2 RA.      Review of Systems:  Pertinent positives are listed above and in physical exam.  All others have been reviewed and are negative.    allopurinol, 50 mg, Oral, Daily  aspirin, 81 mg, Oral, Daily  enoxaparin, 40 mg, Subcutaneous, Daily  isosorbide mononitrate, 30 mg, Oral, Q24H  pantoprazole, 40 mg, Oral, QAM  prasugrel, 10 mg, Oral, Daily  rosuvastatin, 40 mg, Oral, Daily  sodium chloride, 10 mL, Intravenous, Q12H        Objective:  Vitals:   height is 167 cm (65.75\") and weight is 90 kg (198 lb 6.6 oz). His oral temperature is 97.7 °F (36.5 °C). His blood pressure is 109/72 and his pulse is 61. His respiration is 14 and oxygen saturation is 94%.     No intake or output data in the 24 hours ending 10/11/22 1005    Physical Exam:  General:  WN, NAD, A and O x3.  CV:  Normal S1,S2. No murmur, rub, or gallop.  Resp:  CTA Jonatan, equal, nonlabored.  Abd:  Soft, + BS, no organomegaly. Nontender to palpation.  Extrem:  No edema BLE, 2+ pedal/PT pulses.            Results from last 7 days   Lab Units 10/09/22  0801   WBC 10*3/mm3 7.68   HEMOGLOBIN g/dL 14.6   HEMATOCRIT % 42.9   PLATELETS 10*3/mm3 185     Results from last 7 days   Lab Units 10/09/22  0801 10/08/22  1943   SODIUM mmol/L 139 139   POTASSIUM mmol/L 3.9 4.1   CHLORIDE mmol/L 106 104   CO2 mmol/L 21.0* 24.0   BUN mg/dL 16 16   CREATININE mg/dL 1.00 1.18   CALCIUM mg/dL 9.6 9.7   BILIRUBIN mg/dL  --  0.6   ALK PHOS U/L  --  80   ALT (SGPT) U/L  --  38   AST (SGOT) U/L  --  29   GLUCOSE mg/dL 113* 86         Results from last 7 days   Lab Units 10/08/22  1943   TSH uIU/mL 1.550         Results from last 7 days   Lab Units 10/08/22  1943   PROBNP pg/mL 15.1       Tele:  NSR    Assessment:  -  CAD with " recent stent to LAD September 2022 with recurrent chest pain.  -  Bradycardia, asymptomatic.   -  HLP  -  Solitary Kidney, congenital  -  GERD  -  Abdominal pain      Plan:  - Serial troponins negative, no acute EKG changes with chest pain events.  -  Continue ASA 81mg daily, Effient 10mg daily, Crestor 40mg QHS.  Imdur 30 mg started, continue  -  Recent GI evaluation not concerning for reflux issues.  Continue PPI.  CT Abd/Pelvis with and without contrast unremarkable. Normal amylase/lipase, UA.  -  Okay for discharge home from cardiology standpoint.  Discussed anxiety with patient/follow up with PCP.  Will continue HAWA and titrate up as needed. Keep follow up appt that was previously scheduled.      I discussed the patient's findings and my recommendations with the patient, any present family members, and the nursing staff.  Priyank Kam MD saw and examined patient, verified hx and PE, read all radiographic studies, reviewed labs and micro data, and formulated dx, plan for treatment and all medical decision making.      Ayesha Mohr PA-C  10/11/22, 10:07 EDT

## 2022-10-11 NOTE — PLAN OF CARE
Problem: Adult Inpatient Plan of Care  Goal: Plan of Care Review  Outcome: Ongoing, Progressing  Flowsheets (Taken 10/11/2022 0335)  Progress: improving  Goal: Patient-Specific Goal (Individualized)  Outcome: Ongoing, Progressing  Goal: Absence of Hospital-Acquired Illness or Injury  Outcome: Ongoing, Progressing  Intervention: Identify and Manage Fall Risk  Recent Flowsheet Documentation  Taken 10/11/2022 0200 by Tatum Carcamo, RN  Safety Promotion/Fall Prevention:   fall prevention program maintained   safety round/check completed  Taken 10/11/2022 0033 by Tatum Carcamo RN  Safety Promotion/Fall Prevention:   fall prevention program maintained   safety round/check completed  Taken 10/10/2022 2210 by Tatum Carcamo RN  Safety Promotion/Fall Prevention:   fall prevention program maintained   safety round/check completed  Taken 10/10/2022 2013 by Tatum Carcamo RN  Safety Promotion/Fall Prevention:   fall prevention program maintained   safety round/check completed  Intervention: Prevent Skin Injury  Recent Flowsheet Documentation  Taken 10/10/2022 2013 by Tatum Carcamo RN  Body Position: supine  Intervention: Prevent and Manage VTE (Venous Thromboembolism) Risk  Recent Flowsheet Documentation  Taken 10/11/2022 0200 by Tatum Carcamo, RN  Activity Management: activity adjusted per tolerance  Taken 10/11/2022 0033 by Tatum Carcamo RN  Activity Management: activity adjusted per tolerance  Taken 10/10/2022 2210 by Tatum Carcamo RN  Activity Management: activity adjusted per tolerance  Taken 10/10/2022 2013 by Tatum Carcamo RN  Activity Management: activity adjusted per tolerance  Intervention: Prevent Infection  Recent Flowsheet Documentation  Taken 10/11/2022 0200 by Tatum Carcamo, RN  Infection Prevention:   rest/sleep promoted   single patient room provided  Taken 10/11/2022 0033 by Tatum Carcamo, PIOTR  Infection Prevention:   rest/sleep promoted   single patient room provided  Taken 10/10/2022 2210 by Tatum Carcamo  RN  Infection Prevention:   rest/sleep promoted   single patient room provided  Taken 10/10/2022 2013 by Tatum Carcamo, RN  Infection Prevention:   rest/sleep promoted   single patient room provided  Goal: Optimal Comfort and Wellbeing  Outcome: Ongoing, Progressing  Intervention: Provide Person-Centered Care  Recent Flowsheet Documentation  Taken 10/10/2022 2013 by Tatum Carcamo, RN  Trust Relationship/Rapport:   care explained   questions answered  Goal: Readiness for Transition of Care  Outcome: Ongoing, Progressing   Goal Outcome Evaluation:           Progress: improving

## 2022-10-11 NOTE — CASE MANAGEMENT/SOCIAL WORK
Case Management Discharge Note      Final Note: Spoke w/pt,  plan is home. Pt requested number for financial assistance, number given. Has transportation. No other d/c needs verbalized @ this time.         Selected Continued Care - Admitted Since 10/8/2022     Destination    No services have been selected for the patient.              Durable Medical Equipment    No services have been selected for the patient.              Dialysis/Infusion    No services have been selected for the patient.              Home Medical Care    No services have been selected for the patient.              Therapy    No services have been selected for the patient.              Community Resources    No services have been selected for the patient.              Community & DME    No services have been selected for the patient.                       Final Discharge Disposition Code: 01 - home or self-care

## 2023-01-17 ENCOUNTER — PRE-ADMISSION TESTING (OUTPATIENT)
Dept: PREADMISSION TESTING | Facility: HOSPITAL | Age: 53
End: 2023-01-17
Payer: MEDICARE

## 2023-01-17 VITALS — BODY MASS INDEX: 34.07 KG/M2 | WEIGHT: 204.48 LBS | HEIGHT: 65 IN

## 2023-01-17 LAB
ALBUMIN SERPL-MCNC: 4.9 G/DL (ref 3.5–5.2)
ALBUMIN/GLOB SERPL: 1.8 G/DL
ALP SERPL-CCNC: 82 U/L (ref 39–117)
ALT SERPL W P-5'-P-CCNC: 28 U/L (ref 1–41)
ANION GAP SERPL CALCULATED.3IONS-SCNC: 12 MMOL/L (ref 5–15)
AST SERPL-CCNC: 22 U/L (ref 1–40)
BILIRUB SERPL-MCNC: 0.5 MG/DL (ref 0–1.2)
BUN SERPL-MCNC: 18 MG/DL (ref 6–20)
BUN/CREAT SERPL: 17.1 (ref 7–25)
CALCIUM SPEC-SCNC: 9.5 MG/DL (ref 8.6–10.5)
CHLORIDE SERPL-SCNC: 105 MMOL/L (ref 98–107)
CO2 SERPL-SCNC: 21 MMOL/L (ref 22–29)
CREAT SERPL-MCNC: 1.05 MG/DL (ref 0.76–1.27)
DEPRECATED RDW RBC AUTO: 42.3 FL (ref 37–54)
EGFRCR SERPLBLD CKD-EPI 2021: 84.4 ML/MIN/1.73
ERYTHROCYTE [DISTWIDTH] IN BLOOD BY AUTOMATED COUNT: 13.1 % (ref 12.3–15.4)
GLOBULIN UR ELPH-MCNC: 2.8 GM/DL
GLUCOSE SERPL-MCNC: 132 MG/DL (ref 65–99)
HCT VFR BLD AUTO: 47.4 % (ref 37.5–51)
HGB BLD-MCNC: 15.6 G/DL (ref 13–17.7)
INR PPP: 0.93 (ref 0.84–1.13)
MCH RBC QN AUTO: 29.2 PG (ref 26.6–33)
MCHC RBC AUTO-ENTMCNC: 32.9 G/DL (ref 31.5–35.7)
MCV RBC AUTO: 88.6 FL (ref 79–97)
PLATELET # BLD AUTO: 201 10*3/MM3 (ref 140–450)
PMV BLD AUTO: 10.4 FL (ref 6–12)
POTASSIUM SERPL-SCNC: 4.2 MMOL/L (ref 3.5–5.2)
PROT SERPL-MCNC: 7.7 G/DL (ref 6–8.5)
PROTHROMBIN TIME: 12.3 SECONDS (ref 11.4–14.4)
RBC # BLD AUTO: 5.35 10*6/MM3 (ref 4.14–5.8)
SODIUM SERPL-SCNC: 138 MMOL/L (ref 136–145)
WBC NRBC COR # BLD: 7.83 10*3/MM3 (ref 3.4–10.8)

## 2023-01-17 PROCEDURE — 36415 COLL VENOUS BLD VENIPUNCTURE: CPT

## 2023-01-17 PROCEDURE — 85610 PROTHROMBIN TIME: CPT

## 2023-01-17 PROCEDURE — 80053 COMPREHEN METABOLIC PANEL: CPT

## 2023-01-17 PROCEDURE — 85027 COMPLETE CBC AUTOMATED: CPT

## 2023-01-17 RX ORDER — EZETIMIBE 10 MG/1
10 TABLET ORAL DAILY
COMMUNITY

## 2023-01-17 RX ORDER — SILDENAFIL 100 MG/1
100 TABLET, FILM COATED ORAL DAILY PRN
COMMUNITY

## 2023-01-17 RX ORDER — HYDROXYZINE HYDROCHLORIDE 25 MG/1
25 TABLET, FILM COATED ORAL EVERY 6 HOURS PRN
COMMUNITY

## 2023-01-17 NOTE — PAT
An arrival time for procedure was not provided during PAT visit. If patient had any questions or concerns about their arrival time, they were instructed to contact their surgeon/physician.  Additionally, if the patient referred to an arrival time that was acquired from their my chart account, patient was encouraged to verify that time with their surgeon/physician. Arrival times are NOT provided in Pre Admission Testing Department.    Patient viewed general PAT education video as instructed in their preoperative information received from their surgeon.  Patient stated the general PAT education video was viewed in its entirety and survey completed.  Copies of [facility] general education handouts (Incentive Spirometry, Meds to Beds Program, Patient Belongings, Pre-op skin preparation instructions, Blood Glucose testing, Visitor policy, Surgery FAQ, Code H) distributed to patient if not printed. Education related to the PAT pass and skin preparation for surgery (if applicable) completed in [facility] as a reinforcement to PAT education video. Patient instructed to return PAT pass provided today as well as completed skin preparation sheet (if applicable) on the day of procedure.     Additionally if patient had not viewed video yet but intended to view it at home or in our waiting area, then referred them to the handout with QR code/link provided during PAT visit.  Instructed patient to complete survey after viewing the video in its entirety.  Encouraged patient/family to read PAT general education handouts thoroughly and notify PAT staff with any questions or concerns. Patient verbalized understanding of all information and priority content.    Patient to apply Chlorhexadine wipes  to surgical area (as instructed) the night before procedure and the AM of procedure. Wipes provided.    Patient denies any current skin issues.     EKG from 10/9/22 in Epic and paper chart. PAT RN called Dr. Kelly's office for cardiac clearance that was  done in December as surgeon's office had not obtained a copy and provided for patient's visit. Copy received from Dr. Kelly's office and scanned to Medical Records. Paper copy in PAT chart.

## 2023-01-24 ENCOUNTER — HOSPITAL ENCOUNTER (OUTPATIENT)
Facility: HOSPITAL | Age: 53
Setting detail: HOSPITAL OUTPATIENT SURGERY
Discharge: HOME | End: 2023-01-24
Attending: SURGERY | Admitting: SURGERY
Payer: MEDICARE

## 2023-01-24 ENCOUNTER — APPOINTMENT (OUTPATIENT)
Dept: GENERAL RADIOLOGY | Facility: HOSPITAL | Age: 53
End: 2023-01-24
Payer: MEDICARE

## 2023-01-24 ENCOUNTER — ANESTHESIA (OUTPATIENT)
Dept: PERIOP | Facility: HOSPITAL | Age: 53
End: 2023-01-24
Payer: MEDICARE

## 2023-01-24 ENCOUNTER — ANESTHESIA EVENT (OUTPATIENT)
Dept: PERIOP | Facility: HOSPITAL | Age: 53
End: 2023-01-24
Payer: MEDICARE

## 2023-01-24 VITALS
HEIGHT: 65 IN | BODY MASS INDEX: 33.99 KG/M2 | WEIGHT: 204 LBS | HEART RATE: 61 BPM | RESPIRATION RATE: 16 BRPM | SYSTOLIC BLOOD PRESSURE: 135 MMHG | DIASTOLIC BLOOD PRESSURE: 87 MMHG | TEMPERATURE: 97.7 F | OXYGEN SATURATION: 98 %

## 2023-01-24 PROCEDURE — G0463 HOSPITAL OUTPT CLINIC VISIT: HCPCS | Performed by: SURGERY

## 2023-01-24 RX ORDER — FENTANYL CITRATE 50 UG/ML
50 INJECTION, SOLUTION INTRAMUSCULAR; INTRAVENOUS
Status: CANCELLED | OUTPATIENT
Start: 2023-01-24

## 2023-01-24 RX ORDER — SODIUM CHLORIDE, SODIUM LACTATE, POTASSIUM CHLORIDE, CALCIUM CHLORIDE 600; 310; 30; 20 MG/100ML; MG/100ML; MG/100ML; MG/100ML
9 INJECTION, SOLUTION INTRAVENOUS CONTINUOUS PRN
Status: DISCONTINUED | OUTPATIENT
Start: 2023-01-24 | End: 2023-01-24 | Stop reason: HOSPADM

## 2023-01-24 RX ORDER — MIDAZOLAM HYDROCHLORIDE 1 MG/ML
1 INJECTION INTRAMUSCULAR; INTRAVENOUS
Status: DISCONTINUED | OUTPATIENT
Start: 2023-01-24 | End: 2023-01-24 | Stop reason: HOSPADM

## 2023-01-24 RX ORDER — ONDANSETRON 2 MG/ML
4 INJECTION INTRAMUSCULAR; INTRAVENOUS ONCE AS NEEDED
Status: CANCELLED | OUTPATIENT
Start: 2023-01-24

## 2023-01-24 RX ORDER — FAMOTIDINE 20 MG/1
20 TABLET, FILM COATED ORAL
Status: DISCONTINUED | OUTPATIENT
Start: 2023-01-24 | End: 2023-01-24 | Stop reason: HOSPADM

## 2023-01-24 RX ORDER — CEFAZOLIN SODIUM 2 G/100ML
2 INJECTION, SOLUTION INTRAVENOUS ONCE
Status: DISCONTINUED | OUTPATIENT
Start: 2023-01-24 | End: 2023-01-24 | Stop reason: HOSPADM

## 2023-01-24 RX ORDER — SODIUM CHLORIDE 9 MG/ML
40 INJECTION, SOLUTION INTRAVENOUS AS NEEDED
Status: CANCELLED | OUTPATIENT
Start: 2023-01-24

## 2023-01-24 RX ORDER — SODIUM CHLORIDE 0.9 % (FLUSH) 0.9 %
10 SYRINGE (ML) INJECTION EVERY 12 HOURS SCHEDULED
Status: CANCELLED | OUTPATIENT
Start: 2023-01-24

## 2023-01-24 RX ORDER — LIDOCAINE HYDROCHLORIDE 10 MG/ML
0.5 INJECTION, SOLUTION EPIDURAL; INFILTRATION; INTRACAUDAL; PERINEURAL ONCE AS NEEDED
Status: DISCONTINUED | OUTPATIENT
Start: 2023-01-24 | End: 2023-01-24 | Stop reason: HOSPADM

## 2023-01-24 RX ORDER — SODIUM CHLORIDE 0.9 % (FLUSH) 0.9 %
10 SYRINGE (ML) INJECTION AS NEEDED
Status: CANCELLED | OUTPATIENT
Start: 2023-01-24

## 2023-01-24 NOTE — H&P
Mr. Barnett presented today for laparoscopic cholecystectomy for biliary dyskinesia.  Unfortunately, he did not stop his Effient prior to operation, in part due to miscommunications between our office and himself.  We will therefore reschedule his operation for next week.  I discussed this at length with the patient agrees to proceed.  We will DC him home today.    Stanley Gasca MD  16:46 EST

## 2023-01-24 NOTE — ANESTHESIA PREPROCEDURE EVALUATION
Anesthesia Evaluation     Patient summary reviewed and Nursing notes reviewed                Airway   Dental      Pulmonary    Cardiovascular     (+) hypertension, past MI , CAD, angina, hyperlipidemia,       Neuro/Psych  (+) psychiatric history Anxiety,    GI/Hepatic/Renal/Endo    (+)  GERD,  renal disease, diabetes mellitus,     Musculoskeletal     Abdominal    Substance History      OB/GYN          Other   arthritis,                      Anesthesia Plan    CODE STATUS:

## 2023-02-01 ENCOUNTER — ANESTHESIA (OUTPATIENT)
Dept: PERIOP | Facility: HOSPITAL | Age: 53
End: 2023-02-01
Payer: MEDICARE

## 2023-02-01 ENCOUNTER — ANESTHESIA EVENT (OUTPATIENT)
Dept: PERIOP | Facility: HOSPITAL | Age: 53
End: 2023-02-01
Payer: MEDICARE

## 2023-02-01 ENCOUNTER — HOSPITAL ENCOUNTER (OUTPATIENT)
Facility: HOSPITAL | Age: 53
Setting detail: HOSPITAL OUTPATIENT SURGERY
Discharge: HOME | End: 2023-02-01
Attending: SURGERY | Admitting: SURGERY
Payer: MEDICARE

## 2023-02-01 ENCOUNTER — APPOINTMENT (OUTPATIENT)
Dept: GENERAL RADIOLOGY | Facility: HOSPITAL | Age: 53
End: 2023-02-01
Payer: MEDICARE

## 2023-02-01 VITALS
DIASTOLIC BLOOD PRESSURE: 84 MMHG | TEMPERATURE: 98 F | BODY MASS INDEX: 33.99 KG/M2 | OXYGEN SATURATION: 97 % | WEIGHT: 204 LBS | RESPIRATION RATE: 16 BRPM | HEART RATE: 59 BPM | SYSTOLIC BLOOD PRESSURE: 124 MMHG | HEIGHT: 65 IN

## 2023-02-01 DIAGNOSIS — K82.8 BILIARY DYSKINESIA: Primary | ICD-10-CM

## 2023-02-01 LAB
GLUCOSE BLDC GLUCOMTR-MCNC: 123 MG/DL (ref 70–130)
GLUCOSE BLDC GLUCOMTR-MCNC: 147 MG/DL (ref 70–130)
POTASSIUM SERPL-SCNC: 4.1 MMOL/L (ref 3.5–5.2)

## 2023-02-01 PROCEDURE — 25010000002 ONDANSETRON PER 1 MG: Performed by: NURSE ANESTHETIST, CERTIFIED REGISTERED

## 2023-02-01 PROCEDURE — 88313 SPECIAL STAINS GROUP 2: CPT | Performed by: SURGERY

## 2023-02-01 PROCEDURE — 25010000002 DEXAMETHASONE PER 1 MG: Performed by: NURSE ANESTHETIST, CERTIFIED REGISTERED

## 2023-02-01 PROCEDURE — 82962 GLUCOSE BLOOD TEST: CPT

## 2023-02-01 PROCEDURE — 88307 TISSUE EXAM BY PATHOLOGIST: CPT | Performed by: SURGERY

## 2023-02-01 PROCEDURE — 25010000002 FENTANYL CITRATE (PF) 50 MCG/ML SOLUTION: Performed by: NURSE ANESTHETIST, CERTIFIED REGISTERED

## 2023-02-01 PROCEDURE — 25010000002 FENTANYL CITRATE (PF) 100 MCG/2ML SOLUTION: Performed by: NURSE ANESTHETIST, CERTIFIED REGISTERED

## 2023-02-01 PROCEDURE — 25010000002 PHENYLEPHRINE 10 MG/ML SOLUTION: Performed by: NURSE ANESTHETIST, CERTIFIED REGISTERED

## 2023-02-01 PROCEDURE — 88304 TISSUE EXAM BY PATHOLOGIST: CPT | Performed by: SURGERY

## 2023-02-01 PROCEDURE — 25010000002 CEFAZOLIN IN DEXTROSE 2-4 GM/100ML-% SOLUTION: Performed by: SURGERY

## 2023-02-01 PROCEDURE — 25010000002 FENTANYL CITRATE (PF) 50 MCG/ML SOLUTION

## 2023-02-01 PROCEDURE — 25010000002 PROPOFOL 10 MG/ML EMULSION: Performed by: NURSE ANESTHETIST, CERTIFIED REGISTERED

## 2023-02-01 PROCEDURE — 25010000002 ONDANSETRON PER 1 MG

## 2023-02-01 PROCEDURE — 25010000002 IOPAMIDOL 61 % SOLUTION: Performed by: SURGERY

## 2023-02-01 PROCEDURE — 84132 ASSAY OF SERUM POTASSIUM: CPT | Performed by: SURGERY

## 2023-02-01 PROCEDURE — 74300 X-RAY BILE DUCTS/PANCREAS: CPT

## 2023-02-01 DEVICE — LIGACLIP 10-M/L, 10MM ENDOSCOPIC ROTATING MULTIPLE CLIP APPLIERS
Type: IMPLANTABLE DEVICE | Site: ABDOMEN | Status: FUNCTIONAL
Brand: LIGACLIP

## 2023-02-01 RX ORDER — PROMETHAZINE HYDROCHLORIDE 25 MG/1
25 TABLET ORAL ONCE AS NEEDED
Status: DISCONTINUED | OUTPATIENT
Start: 2023-02-01 | End: 2023-02-01 | Stop reason: HOSPADM

## 2023-02-01 RX ORDER — DEXAMETHASONE SODIUM PHOSPHATE 4 MG/ML
INJECTION, SOLUTION INTRA-ARTICULAR; INTRALESIONAL; INTRAMUSCULAR; INTRAVENOUS; SOFT TISSUE AS NEEDED
Status: DISCONTINUED | OUTPATIENT
Start: 2023-02-01 | End: 2023-02-01 | Stop reason: SURG

## 2023-02-01 RX ORDER — MEPERIDINE HYDROCHLORIDE 25 MG/ML
12.5 INJECTION INTRAMUSCULAR; INTRAVENOUS; SUBCUTANEOUS
Status: DISCONTINUED | OUTPATIENT
Start: 2023-02-01 | End: 2023-02-01 | Stop reason: HOSPADM

## 2023-02-01 RX ORDER — BUPIVACAINE HYDROCHLORIDE AND EPINEPHRINE 2.5; 5 MG/ML; UG/ML
INJECTION, SOLUTION EPIDURAL; INFILTRATION; INTRACAUDAL; PERINEURAL AS NEEDED
Status: DISCONTINUED | OUTPATIENT
Start: 2023-02-01 | End: 2023-02-01 | Stop reason: HOSPADM

## 2023-02-01 RX ORDER — MIDAZOLAM HYDROCHLORIDE 1 MG/ML
1 INJECTION INTRAMUSCULAR; INTRAVENOUS
Status: DISCONTINUED | OUTPATIENT
Start: 2023-02-01 | End: 2023-02-01 | Stop reason: HOSPADM

## 2023-02-01 RX ORDER — SODIUM CHLORIDE 9 MG/ML
40 INJECTION, SOLUTION INTRAVENOUS AS NEEDED
Status: DISCONTINUED | OUTPATIENT
Start: 2023-02-01 | End: 2023-02-01 | Stop reason: HOSPADM

## 2023-02-01 RX ORDER — ROCURONIUM BROMIDE 10 MG/ML
INJECTION, SOLUTION INTRAVENOUS AS NEEDED
Status: DISCONTINUED | OUTPATIENT
Start: 2023-02-01 | End: 2023-02-01 | Stop reason: SURG

## 2023-02-01 RX ORDER — SODIUM CHLORIDE 0.9 % (FLUSH) 0.9 %
3-10 SYRINGE (ML) INJECTION AS NEEDED
Status: DISCONTINUED | OUTPATIENT
Start: 2023-02-01 | End: 2023-02-01 | Stop reason: HOSPADM

## 2023-02-01 RX ORDER — DROPERIDOL 2.5 MG/ML
0.62 INJECTION, SOLUTION INTRAMUSCULAR; INTRAVENOUS
Status: DISCONTINUED | OUTPATIENT
Start: 2023-02-01 | End: 2023-02-01 | Stop reason: HOSPADM

## 2023-02-01 RX ORDER — CEFAZOLIN SODIUM 2 G/100ML
2 INJECTION, SOLUTION INTRAVENOUS ONCE
Status: COMPLETED | OUTPATIENT
Start: 2023-02-01 | End: 2023-02-01

## 2023-02-01 RX ORDER — SODIUM CHLORIDE 0.9 % (FLUSH) 0.9 %
10 SYRINGE (ML) INJECTION EVERY 12 HOURS SCHEDULED
Status: DISCONTINUED | OUTPATIENT
Start: 2023-02-01 | End: 2023-02-01 | Stop reason: HOSPADM

## 2023-02-01 RX ORDER — PHENYLEPHRINE HYDROCHLORIDE 10 MG/ML
INJECTION INTRAVENOUS AS NEEDED
Status: DISCONTINUED | OUTPATIENT
Start: 2023-02-01 | End: 2023-02-01 | Stop reason: SURG

## 2023-02-01 RX ORDER — DOCUSATE SODIUM 100 MG/1
100 CAPSULE, LIQUID FILLED ORAL ONCE
Status: DISCONTINUED | OUTPATIENT
Start: 2023-02-01 | End: 2023-02-01 | Stop reason: HOSPADM

## 2023-02-01 RX ORDER — SODIUM CHLORIDE, SODIUM LACTATE, POTASSIUM CHLORIDE, CALCIUM CHLORIDE 600; 310; 30; 20 MG/100ML; MG/100ML; MG/100ML; MG/100ML
INJECTION, SOLUTION INTRAVENOUS CONTINUOUS PRN
Status: DISCONTINUED | OUTPATIENT
Start: 2023-02-01 | End: 2023-02-01 | Stop reason: SURG

## 2023-02-01 RX ORDER — ONDANSETRON 2 MG/ML
INJECTION INTRAMUSCULAR; INTRAVENOUS AS NEEDED
Status: DISCONTINUED | OUTPATIENT
Start: 2023-02-01 | End: 2023-02-01 | Stop reason: SURG

## 2023-02-01 RX ORDER — FAMOTIDINE 10 MG/ML
20 INJECTION, SOLUTION INTRAVENOUS ONCE
Status: CANCELLED | OUTPATIENT
Start: 2023-02-01 | End: 2023-02-01

## 2023-02-01 RX ORDER — PROMETHAZINE HYDROCHLORIDE 25 MG/1
25 SUPPOSITORY RECTAL ONCE AS NEEDED
Status: DISCONTINUED | OUTPATIENT
Start: 2023-02-01 | End: 2023-02-01 | Stop reason: HOSPADM

## 2023-02-01 RX ORDER — SODIUM CHLORIDE 0.9 % (FLUSH) 0.9 %
10 SYRINGE (ML) INJECTION AS NEEDED
Status: DISCONTINUED | OUTPATIENT
Start: 2023-02-01 | End: 2023-02-01 | Stop reason: HOSPADM

## 2023-02-01 RX ORDER — HYDROCODONE BITARTRATE AND ACETAMINOPHEN 5; 325 MG/1; MG/1
1 TABLET ORAL ONCE AS NEEDED
Status: DISCONTINUED | OUTPATIENT
Start: 2023-02-01 | End: 2023-02-01 | Stop reason: HOSPADM

## 2023-02-01 RX ORDER — EPHEDRINE SULFATE 50 MG/ML
INJECTION INTRAVENOUS AS NEEDED
Status: DISCONTINUED | OUTPATIENT
Start: 2023-02-01 | End: 2023-02-01 | Stop reason: SURG

## 2023-02-01 RX ORDER — ONDANSETRON 2 MG/ML
INJECTION INTRAMUSCULAR; INTRAVENOUS
Status: COMPLETED
Start: 2023-02-01 | End: 2023-02-01

## 2023-02-01 RX ORDER — LABETALOL HYDROCHLORIDE 5 MG/ML
5 INJECTION, SOLUTION INTRAVENOUS
Status: DISCONTINUED | OUTPATIENT
Start: 2023-02-01 | End: 2023-02-01 | Stop reason: HOSPADM

## 2023-02-01 RX ORDER — FENTANYL CITRATE 50 UG/ML
50 INJECTION, SOLUTION INTRAMUSCULAR; INTRAVENOUS
Status: DISCONTINUED | OUTPATIENT
Start: 2023-02-01 | End: 2023-02-01 | Stop reason: HOSPADM

## 2023-02-01 RX ORDER — ONDANSETRON 2 MG/ML
4 INJECTION INTRAMUSCULAR; INTRAVENOUS ONCE AS NEEDED
Status: COMPLETED | OUTPATIENT
Start: 2023-02-01 | End: 2023-02-01

## 2023-02-01 RX ORDER — PROMETHAZINE HYDROCHLORIDE 12.5 MG/1
12.5 TABLET ORAL ONCE AS NEEDED
Status: DISCONTINUED | OUTPATIENT
Start: 2023-02-01 | End: 2023-02-01 | Stop reason: HOSPADM

## 2023-02-01 RX ORDER — LIDOCAINE HYDROCHLORIDE 10 MG/ML
INJECTION, SOLUTION EPIDURAL; INFILTRATION; INTRACAUDAL; PERINEURAL AS NEEDED
Status: DISCONTINUED | OUTPATIENT
Start: 2023-02-01 | End: 2023-02-01 | Stop reason: SURG

## 2023-02-01 RX ORDER — NALOXONE HCL 0.4 MG/ML
0.4 VIAL (ML) INJECTION AS NEEDED
Status: DISCONTINUED | OUTPATIENT
Start: 2023-02-01 | End: 2023-02-01 | Stop reason: HOSPADM

## 2023-02-01 RX ORDER — MAGNESIUM HYDROXIDE 1200 MG/15ML
LIQUID ORAL AS NEEDED
Status: DISCONTINUED | OUTPATIENT
Start: 2023-02-01 | End: 2023-02-01 | Stop reason: HOSPADM

## 2023-02-01 RX ORDER — FENTANYL CITRATE 50 UG/ML
INJECTION, SOLUTION INTRAMUSCULAR; INTRAVENOUS AS NEEDED
Status: DISCONTINUED | OUTPATIENT
Start: 2023-02-01 | End: 2023-02-01 | Stop reason: SURG

## 2023-02-01 RX ORDER — DROPERIDOL 2.5 MG/ML
0.62 INJECTION, SOLUTION INTRAMUSCULAR; INTRAVENOUS ONCE AS NEEDED
Status: DISCONTINUED | OUTPATIENT
Start: 2023-02-01 | End: 2023-02-01 | Stop reason: HOSPADM

## 2023-02-01 RX ORDER — DOCUSATE SODIUM 250 MG
250 CAPSULE ORAL 2 TIMES DAILY
Qty: 20 CAPSULE | Refills: 0 | Status: SHIPPED | OUTPATIENT
Start: 2023-02-01

## 2023-02-01 RX ORDER — SODIUM CHLORIDE 9 MG/ML
INJECTION, SOLUTION INTRAVENOUS AS NEEDED
Status: DISCONTINUED | OUTPATIENT
Start: 2023-02-01 | End: 2023-02-01 | Stop reason: HOSPADM

## 2023-02-01 RX ORDER — GLYCOPYRROLATE 0.2 MG/ML
INJECTION INTRAMUSCULAR; INTRAVENOUS AS NEEDED
Status: DISCONTINUED | OUTPATIENT
Start: 2023-02-01 | End: 2023-02-01 | Stop reason: SURG

## 2023-02-01 RX ORDER — IPRATROPIUM BROMIDE AND ALBUTEROL SULFATE 2.5; .5 MG/3ML; MG/3ML
3 SOLUTION RESPIRATORY (INHALATION) ONCE AS NEEDED
Status: DISCONTINUED | OUTPATIENT
Start: 2023-02-01 | End: 2023-02-01 | Stop reason: HOSPADM

## 2023-02-01 RX ORDER — PROPOFOL 10 MG/ML
VIAL (ML) INTRAVENOUS AS NEEDED
Status: DISCONTINUED | OUTPATIENT
Start: 2023-02-01 | End: 2023-02-01 | Stop reason: SURG

## 2023-02-01 RX ORDER — FENTANYL CITRATE 50 UG/ML
INJECTION, SOLUTION INTRAMUSCULAR; INTRAVENOUS
Status: COMPLETED
Start: 2023-02-01 | End: 2023-02-01

## 2023-02-01 RX ORDER — SODIUM CHLORIDE, SODIUM LACTATE, POTASSIUM CHLORIDE, CALCIUM CHLORIDE 600; 310; 30; 20 MG/100ML; MG/100ML; MG/100ML; MG/100ML
9 INJECTION, SOLUTION INTRAVENOUS CONTINUOUS
Status: DISCONTINUED | OUTPATIENT
Start: 2023-02-01 | End: 2023-02-01 | Stop reason: HOSPADM

## 2023-02-01 RX ORDER — FAMOTIDINE 20 MG/1
20 TABLET, FILM COATED ORAL ONCE
Status: COMPLETED | OUTPATIENT
Start: 2023-02-01 | End: 2023-02-01

## 2023-02-01 RX ORDER — SODIUM CHLORIDE 0.9 % (FLUSH) 0.9 %
3 SYRINGE (ML) INJECTION EVERY 12 HOURS SCHEDULED
Status: DISCONTINUED | OUTPATIENT
Start: 2023-02-01 | End: 2023-02-01 | Stop reason: HOSPADM

## 2023-02-01 RX ORDER — ONDANSETRON 4 MG/1
4 TABLET, FILM COATED ORAL EVERY 8 HOURS PRN
Qty: 30 TABLET | Refills: 0 | Status: SHIPPED | OUTPATIENT
Start: 2023-02-01

## 2023-02-01 RX ORDER — OXYCODONE HYDROCHLORIDE AND ACETAMINOPHEN 5; 325 MG/1; MG/1
1 TABLET ORAL EVERY 4 HOURS PRN
Qty: 17 TABLET | Refills: 0 | Status: SHIPPED | OUTPATIENT
Start: 2023-02-01

## 2023-02-01 RX ORDER — LIDOCAINE HYDROCHLORIDE 10 MG/ML
0.5 INJECTION, SOLUTION EPIDURAL; INFILTRATION; INTRACAUDAL; PERINEURAL ONCE AS NEEDED
Status: COMPLETED | OUTPATIENT
Start: 2023-02-01 | End: 2023-02-01

## 2023-02-01 RX ORDER — HYDRALAZINE HYDROCHLORIDE 20 MG/ML
5 INJECTION INTRAMUSCULAR; INTRAVENOUS
Status: DISCONTINUED | OUTPATIENT
Start: 2023-02-01 | End: 2023-02-01 | Stop reason: HOSPADM

## 2023-02-01 RX ADMIN — PROPOFOL 25 MCG/KG/MIN: 10 INJECTION, EMULSION INTRAVENOUS at 12:35

## 2023-02-01 RX ADMIN — ONDANSETRON 4 MG: 2 INJECTION INTRAMUSCULAR; INTRAVENOUS at 13:47

## 2023-02-01 RX ADMIN — FAMOTIDINE 20 MG: 20 TABLET, FILM COATED ORAL at 11:13

## 2023-02-01 RX ADMIN — CEFAZOLIN SODIUM 2 G: 2 INJECTION, SOLUTION INTRAVENOUS at 12:33

## 2023-02-01 RX ADMIN — EPHEDRINE SULFATE 5 MG: 50 INJECTION INTRAVENOUS at 13:01

## 2023-02-01 RX ADMIN — EPHEDRINE SULFATE 10 MG: 50 INJECTION INTRAVENOUS at 12:56

## 2023-02-01 RX ADMIN — ROCURONIUM BROMIDE 50 MG: 50 INJECTION INTRAVENOUS at 12:30

## 2023-02-01 RX ADMIN — ROCURONIUM BROMIDE 20 MG: 50 INJECTION INTRAVENOUS at 13:05

## 2023-02-01 RX ADMIN — FENTANYL CITRATE 50 MCG: 50 INJECTION, SOLUTION INTRAMUSCULAR; INTRAVENOUS at 13:48

## 2023-02-01 RX ADMIN — SODIUM CHLORIDE, POTASSIUM CHLORIDE, SODIUM LACTATE AND CALCIUM CHLORIDE: 600; 310; 30; 20 INJECTION, SOLUTION INTRAVENOUS at 12:26

## 2023-02-01 RX ADMIN — ONDANSETRON 4 MG: 2 INJECTION INTRAMUSCULAR; INTRAVENOUS at 13:21

## 2023-02-01 RX ADMIN — PHENYLEPHRINE HYDROCHLORIDE 50 MCG: 10 INJECTION INTRAVENOUS at 13:12

## 2023-02-01 RX ADMIN — FENTANYL CITRATE 50 MCG: 50 INJECTION, SOLUTION INTRAMUSCULAR; INTRAVENOUS at 12:37

## 2023-02-01 RX ADMIN — SUGAMMADEX 200 MG: 100 INJECTION, SOLUTION INTRAVENOUS at 13:21

## 2023-02-01 RX ADMIN — FENTANYL CITRATE 50 MCG: 50 INJECTION, SOLUTION INTRAMUSCULAR; INTRAVENOUS at 12:30

## 2023-02-01 RX ADMIN — PROPOFOL 200 MG: 10 INJECTION, EMULSION INTRAVENOUS at 12:30

## 2023-02-01 RX ADMIN — SUGAMMADEX 200 MG: 100 INJECTION, SOLUTION INTRAVENOUS at 13:25

## 2023-02-01 RX ADMIN — LIDOCAINE HYDROCHLORIDE 0.5 ML: 10 INJECTION, SOLUTION EPIDURAL; INFILTRATION; INTRACAUDAL; PERINEURAL at 11:13

## 2023-02-01 RX ADMIN — SODIUM CHLORIDE, POTASSIUM CHLORIDE, SODIUM LACTATE AND CALCIUM CHLORIDE 9 ML/HR: 600; 310; 30; 20 INJECTION, SOLUTION INTRAVENOUS at 11:14

## 2023-02-01 RX ADMIN — DEXAMETHASONE SODIUM PHOSPHATE 8 MG: 4 INJECTION, SOLUTION INTRAMUSCULAR; INTRAVENOUS at 12:35

## 2023-02-01 RX ADMIN — FENTANYL CITRATE 50 MCG: 50 INJECTION, SOLUTION INTRAMUSCULAR; INTRAVENOUS at 13:55

## 2023-02-01 RX ADMIN — LIDOCAINE HYDROCHLORIDE 50 MG: 10 INJECTION, SOLUTION EPIDURAL; INFILTRATION; INTRACAUDAL; PERINEURAL at 12:30

## 2023-02-01 RX ADMIN — GLYCOPYRROLATE 0.2 MCG: 0.2 INJECTION INTRAMUSCULAR; INTRAVENOUS at 12:56

## 2023-02-01 NOTE — ANESTHESIA PROCEDURE NOTES
Airway  Urgency: elective    Date/Time: 2/1/2023 12:33 PM  Airway not difficult    General Information and Staff    Patient location during procedure: OR  CRNA/CAA: Raya Chavis CRNA  SRNA: Marilu Stephens SRNA  Indications and Patient Condition  Indications for airway management: airway protection    Preoxygenated: yes  MILS not maintained throughout  Mask difficulty assessment: 1 - vent by mask    Final Airway Details  Final airway type: endotracheal airway      Successful airway: ETT  Cuffed: yes   Successful intubation technique: direct laryngoscopy  Facilitating devices/methods: intubating stylet  Endotracheal tube insertion site: oral  Blade: Nirali  Blade size: 4  ETT size (mm): 7.5  Cormack-Lehane Classification: grade I - full view of glottis  Placement verified by: chest auscultation and capnometry   Measured from: lips  ETT/EBT  to lips (cm): 20  Number of attempts at approach: 1  Assessment: lips, teeth, and gum same as pre-op and atraumatic intubation    Additional Comments  Negative epigastric sounds, Breath sound equal bilaterally with symmetric chest rise and fall

## 2023-02-01 NOTE — ANESTHESIA PREPROCEDURE EVALUATION
Anesthesia Evaluation     Patient summary reviewed and Nursing notes reviewed   no history of anesthetic complications:  NPO Solid Status: > 8 hours  NPO Liquid Status: > 8 hours           Airway   Mallampati: II  TM distance: >3 FB  Neck ROM: full  No difficulty expected  Dental      Pulmonary - negative pulmonary ROS and normal exam   Cardiovascular - normal exam    (+) hypertension, past MI , CAD, angina, hyperlipidemia,       Neuro/Psych  (+) psychiatric history,    GI/Hepatic/Renal/Endo    (+) morbid obesity, GERD,  renal disease, diabetes mellitus,     Musculoskeletal     Abdominal    Substance History      OB/GYN          Other   arthritis,                      Anesthesia Plan    ASA 3     general     intravenous induction     Anesthetic plan, risks, benefits, and alternatives have been provided, discussed and informed consent has been obtained with: patient.        CODE STATUS:

## 2023-02-01 NOTE — INTERVAL H&P NOTE
Pre-Op H&P (See Recent Office Note Attached for Full H&P)    History and physical note from office reviewed and updated with the following, otherwise there are no changes in H&P:    Review of Systems:  General ROS:  no fever, chills, rashes.  No change since last office visit.  No recent sick exposure  Cardiovascular ROS: no chest pain or dyspnea on exertion  Respiratory ROS: no cough, shortness of breath, or wheezing    Immunization History:   Influenza: NO    Pneumococcal:  NO  Tetanus:  UNKNOWN (>10YRS)    Meds:    No current facility-administered medications on file prior to encounter.     Current Outpatient Medications on File Prior to Encounter   Medication Sig Dispense Refill   • ALLOPURINOL PO Take 300 mg by mouth Daily.     • aspirin 81 MG EC tablet Take 81 mg by mouth Daily.     • ezetimibe (ZETIA) 10 MG tablet Take 10 mg by mouth Daily.     • famotidine (PEPCID) 20 MG tablet Take 1 tablet by mouth 2 (Two) Times a Day. 30 tablet 0   • hydrOXYzine (ATARAX) 25 MG tablet Take 25 mg by mouth Every 6 (Six) Hours As Needed for Anxiety.     • metFORMIN (GLUCOPHAGE) 500 MG tablet Take 500 mg by mouth 2 (Two) Times a Day With Meals.     • nitroglycerin (NITROSTAT) 0.4 MG SL tablet 1 under the tongue as needed for angina, may repeat q5mins for up three doses 100 tablet 1   • omeprazole (priLOSEC) 20 MG capsule Take 20 mg by mouth Daily.     • prasugrel (EFFIENT) 10 MG tablet Take 1 tablet by mouth Daily. 90 tablet 3   • rosuvastatin (CRESTOR) 40 MG tablet Take 40 mg by mouth Every Night.     • sildenafil (VIAGRA) 100 MG tablet Take 100 mg by mouth Daily As Needed for Erectile Dysfunction.     • [DISCONTINUED] metoprolol succinate XL (TOPROL-XL) 25 MG 24 hr tablet Take 1 tablet by mouth Daily. 90 tablet 1       Vital Signs:  HD=153/99      HR=67      SpO2=96% room air    TEMP=97.8    Physical Exam:    CV:  S1S2 regular rate and rhythm, no murmur               Resp:  Clear to auscultation; respirations regular, even  and unlabored    Results Review:     Lab Results   Component Value Date    WBC 7.83 01/17/2023    HGB 15.6 01/17/2023    HCT 47.4 01/17/2023    MCV 88.6 01/17/2023     01/17/2023    NEUTROABS 4.54 10/09/2022    GLUCOSE 132 (H) 01/17/2023    BUN 18 01/17/2023    CREATININE 1.05 01/17/2023    EGFRIFNONA 71 12/22/2021     01/17/2023    K 4.2 01/17/2023     01/17/2023    CO2 21.0 (L) 01/17/2023    MG 2.0 10/09/2022    CALCIUM 9.5 01/17/2023    ALBUMIN 4.9 01/17/2023    AST 22 01/17/2023    ALT 28 01/17/2023    BILITOT 0.5 01/17/2023    PTT 25 12/21/2015   I reviewed the patient's new clinical results.    Cancer Staging (if applicable)  Cancer Patient: __ yes __no __unknown; If yes, clinical stage T:__ N:__M:__, stage group or __N/A    Assessment/Plan:  BILIARY DYSKINESIA /  CHOLECYSTECTOMY LAPAROSCOPIC INTRAOPERATIVE CHOLANGIOGRAM      FILIPE Bartholomew   2/1/2023   10:53 EST

## 2023-02-01 NOTE — BRIEF OP NOTE
CHOLECYSTECTOMY LAPAROSCOPIC INTRAOPERATIVE CHOLANGIOGRAM  Progress Note    Geovanny Barnett  2/1/2023    Pre-op Diagnosis:   Biliary dyskinesia       Post-Op Diagnosis Codes:  Same    Procedure/CPT® Codes:        Procedure(s):  CHOLECYSTECTOMY LAPAROSCOPIC INTRAOPERATIVE CHOLANGIOGRAM AND LIVER BIOPSY        Surgeon(s):  Stanley Gasca MD    Anesthesia: General    Staff:   Circulator: Leilani Mendoza RN  Radiology Technologist: Gt Humphrey RT  Scrub Person: Luis A Cobb  Nursing Assistant: Naomi Mars PCT         Estimated Blood Loss: 15 mL    Urine Voided: * No values recorded between 2/1/2023 12:26 PM and 2/1/2023  1:21 PM *    Specimens:                Specimens     ID Source Type Tests Collected By Collected At Frozen?    A Gallbladder Tissue · TISSUE PATHOLOGY EXAM   Stanley Gasca MD 2/1/23 1304     This specimen was not marked as sent.    B Liver Tissue · TISSUE PATHOLOGY EXAM   Stanley Gasca MD 2/1/23 1313     Description: Liver Biopsy    This specimen was not marked as sent.                Drains: * No LDAs found *    Findings:   1. IOC (-)        Complications: None          Stanley Gasca MD     Date: 2/1/2023  Time: 13:25 EST

## 2023-02-01 NOTE — ANESTHESIA POSTPROCEDURE EVALUATION
Patient: Geovanny Barnett    Procedure Summary     Date: 02/01/23 Room / Location:  [facility]  [facility]  [facility]    Anesthesia Start: 1226 Anesthesia Stop: 1338    Procedure: CHOLECYSTECTOMY LAPAROSCOPIC INTRAOPERATIVE CHOLANGIOGRAM AND LIVER BIOPSY (Abdomen) Diagnosis:     Surgeons: Stanley Gasca MD Provider: Ricky Spann MD    Anesthesia Type: general ASA Status: 3          Anesthesia Type: general    Vitals  Vitals Value Taken Time   /93 02/01/23 1339   Temp 97.4 °F (36.3 °C) 02/01/23 1339   Pulse 71 02/01/23 1339   Resp 16 02/01/23 1339   SpO2 97 % 02/01/23 1339           Post Anesthesia Care and Evaluation    Patient location during evaluation: PACU  Patient participation: complete - patient participated  Level of consciousness: awake and alert  Pain management: adequate    Airway patency: patent  Anesthetic complications: No anesthetic complications  PONV Status: none  Cardiovascular status: hemodynamically stable and acceptable  Respiratory status: nonlabored ventilation, acceptable and nasal cannula  Hydration status: acceptable

## 2023-02-01 NOTE — OP NOTE
Operative Report    Patient Name:  Geovanny Barnett  YOB: 1970  [id number]  2/1/2023      PREOPERATIVE DIAGNOSIS: Biliary dyskinesia      POSTOPERATIVE DIAGNOSIS: Same        PROCEDURE PERFORMED:     Laparoscopic cholecystectomy with intra-operative cholangiography        SURGEON: Stanley Gasca MD      ASSISTANT:         SPECIMENS:  1.  Gallbladder and contents  2.  Liver biopsy       ANESTHESIA: General.     EBL: Minimal        FINDINGS:     1. Gallbladder in standard positioning     2. Intra-operative cholangiogram demonstrated excellent filling of the cystic, common, right and left hepatic ducts with good flow of contrast into the duodenum without retained stone or filling defect        INDICATIONS:      The patient is a 52 y.o. male with a history of abdominal pain, concerning for biliary dyskinesia. Pre-operative imaging including HIDA scan confirmed the diagnosis. The risks and benefits of Laparoscopic cholecystectomy with cholangiography were discussed with the patient and their family and they agreed to proceed.        DESCRIPTION OF PROCEDURE:     After obtaining informed consent, the patient was taken to the operating room and placed in the supine position. After appropriate DVT and antibiotic prophylaxis, general anesthesia was induced. The abdomen was prepped and draped in standard sterile fashion, and after infiltrating the skin with local anesthetic, a 12mm skin incision was made superior to the umbilicus . Blunt dissection was carried down to the base of the umbilicus, which was grasped with a Kocher clamp and elevated anteriorly. A vertical midline incision was made in the fascia, and blunt dissection was carried down into the peritoneal cavity. A stay suture of 0 Vicryl was then placed in figure-of-eight fashion around the defect, and a blunt trocar advanced without difficulty into the abdominal cavity.  The abdomen was insufflated with carbon dioxide gas to a pressure of 15 mmHg,  "and a laparoscope advanced through the trocar and the abdominal contents were inspected. There was no evidence of bowel, bladder, or visceral injury with entrance of the trocar . At this point, after infiltrating the skin with local anesthetic, a standard laparoscopic cholecystectomy trocar placement schema was followed.     The gallbladder was grasped and elevated superiorly.  The liver had changes consistent with fatty liver disease.  Using meticulous blunt dissection , the cystic duct and cystic artery were bluntly dissected free of other structures and clearly identified using the \"Critical View\" technique. The cystic artery was then clipped twice proximally and once distally, and divided between the clips.     The cystic duct was then clipped at its junction with the infundibulum of the gallbladder, and transected across 50% of its circumference. A cholangiogram catheter was then placed within the duct, and on-table cholangiography under fluoroscopy was obtained. There was excellent filling of the cystic, common, right and left hepatic ducts with good flow of contrast into the duodenum without retained stone or filling defect  . The cholangiogram catheter was then removed, and the cystic duct was ligated using a 2-0 silk suture tied laparoscopically,  reinforced with hemoclips, and divided.     The gallbladder was then dissected free of the gallbladder fossa using a combination of electrocautery and blunt dissection. The gallbladder was then placed in an Endo Catch bag, and removed from the inferiormost trocar site. It was inspected on the back table, correlated with intra-operative findings, and passed off as specimen.  Given the liver changes, a small piece of the inferior to the liver was resected sharply and passed off his liver biopsy specimen.  The area was treated with electrocautery to prevent bleeding.     The right upper quadrant was then inspected. The cystic duct and cystic artery stumps were intact " without bleeding or biliary leak. The right upper quadrant was irrigated with saline until clear. The abdomen was deflated and reinsufflated to make sure pneumoperitoneum was not tamponading any bleeding and there was none.  The abdomen was again irrigated with saline until clear, and all trocars removed under direct and laparoscopic visualization. The fascia at the inferiormost incision was closed using the previously placed 0 Vicryl suture. The wounds were irrigated with normal saline, and closed in each area using absorbable subcuticular suture. The incisions were dressed in standard sterile fashion and covered with dry dressings. The patient recovered from anesthesia, was extubated in the operating room, and transferred to the PACU in stable condition.  All sponge and needle counts were correct times two at the completion of the procedure.     COMPLICATIONS: None           Stanley Gasca MD  2/1/2023  13:26 EST

## 2023-02-02 LAB
CYTO UR: NORMAL
LAB AP CASE REPORT: NORMAL
LAB AP CLINICAL INFORMATION: NORMAL
LAB AP DIAGNOSIS COMMENT: NORMAL
PATH REPORT.FINAL DX SPEC: NORMAL
PATH REPORT.GROSS SPEC: NORMAL

## 2023-10-03 RX ORDER — PRASUGREL 10 MG/1
10 TABLET, FILM COATED ORAL DAILY
Qty: 30 TABLET | Refills: 0 | Status: SHIPPED | OUTPATIENT
Start: 2023-10-03

## 2023-11-06 RX ORDER — PRASUGREL 10 MG/1
10 TABLET, FILM COATED ORAL DAILY
Qty: 30 TABLET | Refills: 0 | Status: SHIPPED | OUTPATIENT
Start: 2023-11-06

## 2023-12-05 RX ORDER — PRASUGREL 10 MG/1
TABLET, FILM COATED ORAL
Qty: 30 TABLET | Refills: 0 | OUTPATIENT
Start: 2023-12-05

## 2023-12-11 RX ORDER — PRASUGREL 10 MG/1
TABLET, FILM COATED ORAL
Qty: 30 TABLET | Refills: 0 | OUTPATIENT
Start: 2023-12-11

## 2023-12-22 ENCOUNTER — HOSPITAL ENCOUNTER (EMERGENCY)
Facility: HOSPITAL | Age: 53
Discharge: HOME | End: 2023-12-23
Attending: EMERGENCY MEDICINE
Payer: MEDICARE

## 2023-12-22 ENCOUNTER — APPOINTMENT (OUTPATIENT)
Dept: GENERAL RADIOLOGY | Facility: HOSPITAL | Age: 53
End: 2023-12-22
Payer: MEDICARE

## 2023-12-22 VITALS
BODY MASS INDEX: 33.75 KG/M2 | HEIGHT: 66 IN | OXYGEN SATURATION: 93 % | RESPIRATION RATE: 14 BRPM | TEMPERATURE: 98.6 F | DIASTOLIC BLOOD PRESSURE: 77 MMHG | SYSTOLIC BLOOD PRESSURE: 110 MMHG | HEART RATE: 82 BPM | WEIGHT: 210 LBS

## 2023-12-22 DIAGNOSIS — R07.9 ACUTE CHEST PAIN: Primary | ICD-10-CM

## 2023-12-22 LAB
ALBUMIN SERPL-MCNC: 5 G/DL (ref 3.5–5.2)
ALBUMIN/GLOB SERPL: 1.6 G/DL
ALP SERPL-CCNC: 74 U/L (ref 39–117)
ALT SERPL W P-5'-P-CCNC: 46 U/L (ref 1–41)
ANION GAP SERPL CALCULATED.3IONS-SCNC: 14 MMOL/L (ref 5–15)
AST SERPL-CCNC: 29 U/L (ref 1–40)
BASOPHILS # BLD AUTO: 0.04 10*3/MM3 (ref 0–0.2)
BASOPHILS NFR BLD AUTO: 0.4 % (ref 0–1.5)
BILIRUB SERPL-MCNC: 0.6 MG/DL (ref 0–1.2)
BUN SERPL-MCNC: 18 MG/DL (ref 6–20)
BUN/CREAT SERPL: 14.6 (ref 7–25)
CALCIUM SPEC-SCNC: 10 MG/DL (ref 8.6–10.5)
CHLORIDE SERPL-SCNC: 101 MMOL/L (ref 98–107)
CO2 SERPL-SCNC: 23 MMOL/L (ref 22–29)
CREAT SERPL-MCNC: 1.23 MG/DL (ref 0.76–1.27)
DEPRECATED RDW RBC AUTO: 43.4 FL (ref 37–54)
EGFRCR SERPLBLD CKD-EPI 2021: 69.3 ML/MIN/1.73
EOSINOPHIL # BLD AUTO: 0.12 10*3/MM3 (ref 0–0.4)
EOSINOPHIL NFR BLD AUTO: 1.3 % (ref 0.3–6.2)
ERYTHROCYTE [DISTWIDTH] IN BLOOD BY AUTOMATED COUNT: 13.2 % (ref 12.3–15.4)
GEN 5 2HR TROPONIN T REFLEX: 9 NG/L
GLOBULIN UR ELPH-MCNC: 3.1 GM/DL
GLUCOSE SERPL-MCNC: 130 MG/DL (ref 65–99)
HCT VFR BLD AUTO: 47.8 % (ref 37.5–51)
HGB BLD-MCNC: 16.1 G/DL (ref 13–17.7)
HOLD SPECIMEN: NORMAL
IMM GRANULOCYTES # BLD AUTO: 0.05 10*3/MM3 (ref 0–0.05)
IMM GRANULOCYTES NFR BLD AUTO: 0.5 % (ref 0–0.5)
LIPASE SERPL-CCNC: 39 U/L (ref 13–60)
LYMPHOCYTES # BLD AUTO: 3.01 10*3/MM3 (ref 0.7–3.1)
LYMPHOCYTES NFR BLD AUTO: 32.9 % (ref 19.6–45.3)
MCH RBC QN AUTO: 30.4 PG (ref 26.6–33)
MCHC RBC AUTO-ENTMCNC: 33.7 G/DL (ref 31.5–35.7)
MCV RBC AUTO: 90.4 FL (ref 79–97)
MONOCYTES # BLD AUTO: 0.5 10*3/MM3 (ref 0.1–0.9)
MONOCYTES NFR BLD AUTO: 5.5 % (ref 5–12)
NEUTROPHILS NFR BLD AUTO: 5.42 10*3/MM3 (ref 1.7–7)
NEUTROPHILS NFR BLD AUTO: 59.4 % (ref 42.7–76)
NRBC BLD AUTO-RTO: 0 /100 WBC (ref 0–0.2)
NT-PROBNP SERPL-MCNC: <36 PG/ML (ref 0–900)
PLATELET # BLD AUTO: 248 10*3/MM3 (ref 140–450)
PMV BLD AUTO: 10.5 FL (ref 6–12)
POTASSIUM SERPL-SCNC: 4.3 MMOL/L (ref 3.5–5.2)
PROT SERPL-MCNC: 8.1 G/DL (ref 6–8.5)
QT INTERVAL: 362 MS
QT INTERVAL: 396 MS
QTC INTERVAL: 392 MS
QTC INTERVAL: 415 MS
RBC # BLD AUTO: 5.29 10*6/MM3 (ref 4.14–5.8)
SODIUM SERPL-SCNC: 138 MMOL/L (ref 136–145)
TROPONIN T DELTA: -2 NG/L
TROPONIN T SERPL HS-MCNC: 11 NG/L
WBC NRBC COR # BLD AUTO: 9.14 10*3/MM3 (ref 3.4–10.8)
WHOLE BLOOD HOLD COAG: NORMAL
WHOLE BLOOD HOLD SPECIMEN: NORMAL

## 2023-12-22 PROCEDURE — 93005 ELECTROCARDIOGRAM TRACING: CPT

## 2023-12-22 PROCEDURE — 93005 ELECTROCARDIOGRAM TRACING: CPT | Performed by: EMERGENCY MEDICINE

## 2023-12-22 PROCEDURE — 84484 ASSAY OF TROPONIN QUANT: CPT | Performed by: EMERGENCY MEDICINE

## 2023-12-22 PROCEDURE — 36415 COLL VENOUS BLD VENIPUNCTURE: CPT

## 2023-12-22 PROCEDURE — 85025 COMPLETE CBC W/AUTO DIFF WBC: CPT | Performed by: EMERGENCY MEDICINE

## 2023-12-22 PROCEDURE — 99284 EMERGENCY DEPT VISIT MOD MDM: CPT

## 2023-12-22 PROCEDURE — 83880 ASSAY OF NATRIURETIC PEPTIDE: CPT | Performed by: EMERGENCY MEDICINE

## 2023-12-22 PROCEDURE — 83690 ASSAY OF LIPASE: CPT | Performed by: EMERGENCY MEDICINE

## 2023-12-22 PROCEDURE — 80053 COMPREHEN METABOLIC PANEL: CPT | Performed by: EMERGENCY MEDICINE

## 2023-12-22 PROCEDURE — 71045 X-RAY EXAM CHEST 1 VIEW: CPT

## 2023-12-22 RX ORDER — SODIUM CHLORIDE 0.9 % (FLUSH) 0.9 %
10 SYRINGE (ML) INJECTION AS NEEDED
Status: DISCONTINUED | OUTPATIENT
Start: 2023-12-22 | End: 2023-12-23 | Stop reason: HOSPADM

## 2023-12-22 RX ORDER — ASPIRIN 81 MG/1
324 TABLET, CHEWABLE ORAL ONCE
Status: DISCONTINUED | OUTPATIENT
Start: 2023-12-22 | End: 2023-12-23 | Stop reason: HOSPADM

## 2023-12-22 NOTE — ED PROVIDER NOTES
Subjective   History of Present Illness  CP for last several days. Constant. Not exertional. No radiation. No fever or chills.  Follow-up with Dr. Kelly.  History of cardiac stents.  Last heart cath was last year.  History of congenital single kidney.  Diabetes.  Hyperlipidemia.  Lung nodule.        Review of Systems    Past Medical History:   Diagnosis Date    Ambulates with cane     s/p hip replacement    Anxiety     Arthritis     Congenital single kidney     Left kidney only    Coronary artery disease 09/2022    stents x 2    Diabetes mellitus     Elevated cholesterol     GERD (gastroesophageal reflux disease)     Gout     Hyperlipidemia     Lung nodule     Left    Myocardial infarction 2013       No Known Allergies    Past Surgical History:   Procedure Laterality Date    ABDOMINAL MASS RESECTION      CARDIAC CATHETERIZATION N/A 09/14/2022    Procedure: Left Heart Cath;  Surgeon: Bro Rodriguez MD;  Location:  [facility];  Service: Cardiology;  Laterality: N/A;    CHOLECYSTECTOMY WITH INTRAOPERATIVE CHOLANGIOGRAM N/A 2/1/2023    Procedure: CHOLECYSTECTOMY LAPAROSCOPIC INTRAOPERATIVE CHOLANGIOGRAM AND LIVER BIOPSY;  Surgeon: Stanley Gasca MD;  Location:  [facility];  Service: General;  Laterality: N/A;    COLONOSCOPY      CORONARY ANGIOPLASTY WITH STENT PLACEMENT      ESOPHAGEAL MOTILITY STUDY N/A 08/16/2022    Procedure: MANOMETRY;  Surgeon: Stanley Gasca MD;  Location:  [facility];  Service: Gastroenterology;  Laterality: N/A;  LES, per screen read out at 42.6 Patient tolerated esophageal manometry well and probe was advanced to 37cm    GASTRIC PH MONITORING 24HR N/A 08/23/2022    Procedure: GASTRIC PH MONITORING 24HR;  Surgeon: Stanley Gasca MD;  Location:  [facility];  Service: General;  Laterality: N/A;  pH impedance probe dropped to 3.5cm  Les from manometry testing registered at 42.6cm    HIP SURGERY Bilateral     Bilat THR       Family History   Problem Relation  Age of Onset    Hypertension Mother     Diabetes Mother     Cancer Mother     Cancer Father     Heart valve disorder Father        Social History     Socioeconomic History    Marital status: Single    Number of children: 0   Tobacco Use    Smoking status: Former     Packs/day: 0.25     Years: 19.00     Additional pack years: 0.00     Total pack years: 4.75     Types: Cigarettes     Quit date:      Years since quittin.9    Smokeless tobacco: Never   Vaping Use    Vaping Use: Never used   Substance and Sexual Activity    Alcohol use: Not Currently    Drug use: No    Sexual activity: Defer           Objective   Physical Exam  Constitutional:       Appearance: He is well-developed.   HENT:      Head: Normocephalic and atraumatic.      Right Ear: External ear normal.      Left Ear: External ear normal.      Nose: Nose normal.   Eyes:      Conjunctiva/sclera: Conjunctivae normal.      Pupils: Pupils are equal, round, and reactive to light.   Cardiovascular:      Rate and Rhythm: Normal rate and regular rhythm.      Heart sounds: Normal heart sounds.   Pulmonary:      Effort: Pulmonary effort is normal.      Breath sounds: Normal breath sounds.   Abdominal:      General: Bowel sounds are normal.      Palpations: Abdomen is soft.   Musculoskeletal:         General: Normal range of motion.      Cervical back: Normal range of motion and neck supple.   Skin:     General: Skin is warm and dry.   Neurological:      Mental Status: He is alert and oriented to person, place, and time.   Psychiatric:         Behavior: Behavior normal.         Judgment: Judgment normal.         Procedures           ED Course  ED Course as of 12/22/23 2342   Fri Dec 22, 2023   1655 Pleasant patient with a broad differential.  History of cardiac stents.  Patient Dr. Kelly.  Will need to get second troponin EKG as well as lab work and imaging.  Patient has solitary kidney we will need to check kidney functions as well.  He also has several  nodules that are being watched as well.  Pain is not pleuritic.  He is not tachycardic or hypoxic. [JM]   2339 Ekgs reviewed with Dr. Bowen. Two neg trp. Resting comfortable. Cp clinic fu. Pt thankful and agreeable with tx poc. Well aware of the ss of worsening condition.   [JM]      ED Course User Index  [JM] Chito Montenegro APRN                HEART Score: 3                    XR Chest 1 View   Final Result   Impression:   No acute process.                  Electronically Signed: Igor Bhatt MD     12/22/2023 5:10 PM EST     Workstation ID: [id number]                  Medical Decision Making  Problems Addressed:  Acute chest pain: complicated acute illness or injury    Amount and/or Complexity of Data Reviewed  External Data Reviewed: labs, radiology and ECG.  Labs: ordered. Decision-making details documented in ED Course.  Radiology: ordered. Decision-making details documented in ED Course.  ECG/medicine tests: ordered. Decision-making details documented in ED Course.    Risk  OTC drugs.  Prescription drug management.        Final diagnoses:   Acute chest pain       ED Disposition  ED Disposition       ED Disposition   Discharge    Condition   Stable    Comment   --               [facility] CARDIOLOGY  [address]  [facility] [id number] [phone]  Schedule an appointment as soon as possible for a visit       Crow Kelly MD  [address]  [place] [address]  [phone]    Schedule an appointment as soon as possible for a visit       [facility] EMERGENCY DEPARTMENT  [address] [facility] [id number] [phone]    If symptoms worsen         Medication List      No changes were made to your prescriptions during this visit.            Chito Montenegro APRN  12/22/23 5713

## 2023-12-24 ENCOUNTER — HOSPITAL ENCOUNTER (EMERGENCY)
Facility: HOSPITAL | Age: 53
Discharge: HOME | End: 2023-12-24
Attending: EMERGENCY MEDICINE | Admitting: EMERGENCY MEDICINE
Payer: MEDICARE

## 2023-12-24 ENCOUNTER — APPOINTMENT (OUTPATIENT)
Dept: GENERAL RADIOLOGY | Facility: HOSPITAL | Age: 53
End: 2023-12-24
Payer: MEDICARE

## 2023-12-24 VITALS
DIASTOLIC BLOOD PRESSURE: 71 MMHG | SYSTOLIC BLOOD PRESSURE: 116 MMHG | OXYGEN SATURATION: 93 % | RESPIRATION RATE: 18 BRPM | HEIGHT: 66 IN | BODY MASS INDEX: 33.75 KG/M2 | WEIGHT: 210 LBS | TEMPERATURE: 97.8 F | HEART RATE: 62 BPM

## 2023-12-24 DIAGNOSIS — R07.9 NONSPECIFIC CHEST PAIN: Primary | ICD-10-CM

## 2023-12-24 LAB
ALBUMIN SERPL-MCNC: 4.5 G/DL (ref 3.5–5.2)
ALBUMIN/GLOB SERPL: 1.7 G/DL
ALP SERPL-CCNC: 68 U/L (ref 39–117)
ALT SERPL W P-5'-P-CCNC: 39 U/L (ref 1–41)
ANION GAP SERPL CALCULATED.3IONS-SCNC: 13 MMOL/L (ref 5–15)
AST SERPL-CCNC: 33 U/L (ref 1–40)
BASOPHILS # BLD AUTO: 0.04 10*3/MM3 (ref 0–0.2)
BASOPHILS NFR BLD AUTO: 0.4 % (ref 0–1.5)
BILIRUB SERPL-MCNC: 0.4 MG/DL (ref 0–1.2)
BUN SERPL-MCNC: 23 MG/DL (ref 6–20)
BUN/CREAT SERPL: 18.1 (ref 7–25)
CALCIUM SPEC-SCNC: 9.2 MG/DL (ref 8.6–10.5)
CHLORIDE SERPL-SCNC: 101 MMOL/L (ref 98–107)
CO2 SERPL-SCNC: 24 MMOL/L (ref 22–29)
CREAT SERPL-MCNC: 1.27 MG/DL (ref 0.76–1.27)
DEPRECATED RDW RBC AUTO: 42.6 FL (ref 37–54)
EGFRCR SERPLBLD CKD-EPI 2021: 66.7 ML/MIN/1.73
EOSINOPHIL # BLD AUTO: 0.17 10*3/MM3 (ref 0–0.4)
EOSINOPHIL NFR BLD AUTO: 1.7 % (ref 0.3–6.2)
ERYTHROCYTE [DISTWIDTH] IN BLOOD BY AUTOMATED COUNT: 13.2 % (ref 12.3–15.4)
GLOBULIN UR ELPH-MCNC: 2.6 GM/DL
GLUCOSE SERPL-MCNC: 143 MG/DL (ref 65–99)
HCT VFR BLD AUTO: 44.2 % (ref 37.5–51)
HGB BLD-MCNC: 14.7 G/DL (ref 13–17.7)
HOLD SPECIMEN: NORMAL
IMM GRANULOCYTES # BLD AUTO: 0.03 10*3/MM3 (ref 0–0.05)
IMM GRANULOCYTES NFR BLD AUTO: 0.3 % (ref 0–0.5)
LIPASE SERPL-CCNC: 39 U/L (ref 13–60)
LYMPHOCYTES # BLD AUTO: 4.45 10*3/MM3 (ref 0.7–3.1)
LYMPHOCYTES NFR BLD AUTO: 44.9 % (ref 19.6–45.3)
MCH RBC QN AUTO: 29.6 PG (ref 26.6–33)
MCHC RBC AUTO-ENTMCNC: 33.3 G/DL (ref 31.5–35.7)
MCV RBC AUTO: 88.9 FL (ref 79–97)
MONOCYTES # BLD AUTO: 0.62 10*3/MM3 (ref 0.1–0.9)
MONOCYTES NFR BLD AUTO: 6.3 % (ref 5–12)
NEUTROPHILS NFR BLD AUTO: 4.6 10*3/MM3 (ref 1.7–7)
NEUTROPHILS NFR BLD AUTO: 46.4 % (ref 42.7–76)
NRBC BLD AUTO-RTO: 0 /100 WBC (ref 0–0.2)
NT-PROBNP SERPL-MCNC: <36 PG/ML (ref 0–900)
PLATELET # BLD AUTO: 222 10*3/MM3 (ref 140–450)
PMV BLD AUTO: 10.6 FL (ref 6–12)
POTASSIUM SERPL-SCNC: 4.2 MMOL/L (ref 3.5–5.2)
PROT SERPL-MCNC: 7.1 G/DL (ref 6–8.5)
RBC # BLD AUTO: 4.97 10*6/MM3 (ref 4.14–5.8)
SODIUM SERPL-SCNC: 138 MMOL/L (ref 136–145)
TROPONIN T SERPL HS-MCNC: 10 NG/L
WBC NRBC COR # BLD AUTO: 9.91 10*3/MM3 (ref 3.4–10.8)
WHOLE BLOOD HOLD COAG: NORMAL
WHOLE BLOOD HOLD SPECIMEN: NORMAL

## 2023-12-24 PROCEDURE — 84484 ASSAY OF TROPONIN QUANT: CPT

## 2023-12-24 PROCEDURE — 99284 EMERGENCY DEPT VISIT MOD MDM: CPT

## 2023-12-24 PROCEDURE — 83690 ASSAY OF LIPASE: CPT

## 2023-12-24 PROCEDURE — 80053 COMPREHEN METABOLIC PANEL: CPT

## 2023-12-24 PROCEDURE — 85025 COMPLETE CBC W/AUTO DIFF WBC: CPT

## 2023-12-24 PROCEDURE — 93005 ELECTROCARDIOGRAM TRACING: CPT

## 2023-12-24 PROCEDURE — 71045 X-RAY EXAM CHEST 1 VIEW: CPT

## 2023-12-24 PROCEDURE — 83880 ASSAY OF NATRIURETIC PEPTIDE: CPT

## 2023-12-24 RX ORDER — ASPIRIN 81 MG/1
324 TABLET, CHEWABLE ORAL ONCE
Status: COMPLETED | OUTPATIENT
Start: 2023-12-24 | End: 2023-12-24

## 2023-12-24 RX ORDER — SODIUM CHLORIDE 0.9 % (FLUSH) 0.9 %
10 SYRINGE (ML) INJECTION AS NEEDED
Status: DISCONTINUED | OUTPATIENT
Start: 2023-12-24 | End: 2023-12-24 | Stop reason: HOSPADM

## 2023-12-24 RX ADMIN — ASPIRIN 324 MG: 81 TABLET, CHEWABLE ORAL at 01:09

## 2023-12-24 NOTE — ED PROVIDER NOTES
Subjective   History of Present Illness  Is a 53-year-old male with past medical history of anxiety and coronary artery disease presented to the emergency department with some chest discomfort.  Patient was seen here recently for similar symptoms.  He was discharged in stable condition.  The patient states that he started having some chest pain when he went to bed.  It was dull in nature.  Radiating from his epigastric region under his sternum.  Is worse when he lies flat.  Patient got very concerned about this given his history and presented to the emergency department.  He has not had any fevers or chills.  No headache or change in vision.  No focal weakness.  No shortness of breath or cough.  No vomiting or diarrhea.    History provided by:  Patient   used: No        Review of Systems   Constitutional:  Negative for chills and fever.   HENT:  Negative for congestion, ear pain and sore throat.    Eyes:  Negative for visual disturbance.   Respiratory:  Negative for shortness of breath.    Cardiovascular:  Positive for chest pain.   Gastrointestinal:  Negative for abdominal pain.   Genitourinary:  Negative for difficulty urinating.   Musculoskeletal:  Negative for arthralgias.   Skin:  Negative for rash.   Neurological:  Negative for dizziness, weakness and numbness.   Psychiatric/Behavioral:  Negative for agitation.        Past Medical History:   Diagnosis Date    Ambulates with cane     s/p hip replacement    Anxiety     Arthritis     Congenital single kidney     Left kidney only    Coronary artery disease 09/2022    stents x 2    Diabetes mellitus     Elevated cholesterol     GERD (gastroesophageal reflux disease)     Gout     Hyperlipidemia     Lung nodule     Left    Myocardial infarction 2013       No Known Allergies    Past Surgical History:   Procedure Laterality Date    ABDOMINAL MASS RESECTION      CARDIAC CATHETERIZATION N/A 09/14/2022    Procedure: Left Heart Cath;  Surgeon: Michael  MD Bro;  Location: [facility] CATH INVASIVE LOCATION;  Service: Cardiology;  Laterality: N/A;    CHOLECYSTECTOMY WITH INTRAOPERATIVE CHOLANGIOGRAM N/A 2023    Procedure: CHOLECYSTECTOMY LAPAROSCOPIC INTRAOPERATIVE CHOLANGIOGRAM AND LIVER BIOPSY;  Surgeon: Stanley Gasca MD;  Location:  [facility] OR;  Service: General;  Laterality: N/A;    COLONOSCOPY      CORONARY ANGIOPLASTY WITH STENT PLACEMENT      ESOPHAGEAL MOTILITY STUDY N/A 2022    Procedure: MANOMETRY;  Surgeon: Stanley Gasca MD;  Location:  [facility];  Service: Gastroenterology;  Laterality: N/A;  LES, per screen read out at 42.6 Patient tolerated esophageal manometry well and probe was advanced to 37cm    GASTRIC PH MONITORING 24HR N/A 2022    Procedure: GASTRIC PH MONITORING 24HR;  Surgeon: Stanley Gasca MD;  Location:  [facility];  Service: General;  Laterality: N/A;  pH impedance probe dropped to 3.5cm  Les from manometry testing registered at 42.6cm    HIP SURGERY Bilateral     Bilat THR       Family History   Problem Relation Age of Onset    Hypertension Mother     Diabetes Mother     Cancer Mother     Cancer Father     Heart valve disorder Father        Social History     Socioeconomic History    Marital status: Single    Number of children: 0   Tobacco Use    Smoking status: Former     Packs/day: 0.25     Years: 19.00     Additional pack years: 0.00     Total pack years: 4.75     Types: Cigarettes     Quit date:      Years since quittin.9    Smokeless tobacco: Never   Vaping Use    Vaping Use: Never used   Substance and Sexual Activity    Alcohol use: Not Currently    Drug use: No    Sexual activity: Defer           Objective   Physical Exam  Vitals and nursing note reviewed.   Constitutional:       General: He is not in acute distress.     Appearance: He is not ill-appearing or toxic-appearing.   HENT:      Mouth/Throat:      Pharynx: No posterior oropharyngeal erythema.   Eyes:      Extraocular  Movements: Extraocular movements intact.      Pupils: Pupils are equal, round, and reactive to light.   Cardiovascular:      Rate and Rhythm: Normal rate and regular rhythm.   Pulmonary:      Effort: Pulmonary effort is normal. No respiratory distress.   Abdominal:      General: Abdomen is flat. There is no distension.      Palpations: There is no mass.      Tenderness: There is no abdominal tenderness. There is no guarding or rebound.   Musculoskeletal:         General: No deformity. Normal range of motion.   Neurological:      General: No focal deficit present.      Mental Status: He is alert.      Sensory: No sensory deficit.      Motor: No weakness.         Procedures           ED Course  ED Course as of 12/24/23 0323   Sun Dec 24, 2023   0207 BP: 129/96 [JK]   0207 Temp: 97.8 °F (36.6 °C) [JK]   0207 Temp src: Oral [JK]   0207 Heart Rate: 60 [JK]   0207 Resp: 18 [JK]   0207 SpO2: 96 % [JK]   0207 Device (Oxygen Therapy): room air  Patient's repeat vitals, telemetry tracing, and pulse oximetry tracing were directly viewed and interpreted by myself.  Patient is hemodynamically stable [JK]   0322 CBC & Differential(!) [JK]   0322 High Sensitivity Troponin T [JK]   0322 BNP [JK]   0322 Lipase [JK]   0322 Comprehensive Metabolic Panel(!)  Laboratory studies were reviewed and interpreted directly by myself.  CBC normal, troponin normal, BNP normal, lipase normal, CMP normal [JK]   0322 XR Chest 1 View  Imaging was directly visualized by myself, per my interpretations, chest x-ray was normal. [JK]   0322 On reevaluation, the patient is feeling much better.  Vital signs have improved.  Overall they are well-appearing.  There were no abnormal cardiopulmonary findings.  No respiratory distress.  Abdomen soft, nontender and no evidence of acute abdomen.  Story is minimally concerning for ACS, PE or dissection given the atypical nature, risk factors, history and physical examination.  Patient's heart score places the  patient at low risk for acute coronary syndrome.  Patient feels comfortable following up with her primary care physician and/or primary cardiologist.  Patient was given strict return precautions.  Verbalized understanding. [JK]   6106 I had a discussion with the patient/family regarding diagnosis, diagnostic results, treatment plan, and medications. The patient/family indicated understanding of these instructions. I spent adequate time at the bedside prior to discharge necessary to discuss the aftercare instructions, giving patient education, providing explanations of the results of our evaluations/findings, and my decision making to assure that the patient/family understand the plan of care. Time was allotted to answer questions at that time and throughout the ED course. Patient is required to maintain timely follow up, as discussed. I also discussed the potential for the development of an acute emergent condition requiring further evaluation, return to the ER, admission, or even surgical intervention.  I encouraged the patient to return to the emergency department immediately for any concerns, worsening symptoms, new complaints, or if symptoms persist and they are unable to seek follow-up in a timely fashion. The patient/family expressed understanding and agreement with this plan    Shared decision making:   After full review of the patient's clinical presentation, review of any work-up including but not limited to laboratory studies and radiology obtained, I had a discussion with the patient.  Treatment options were discussed as well as the risks, benefits and consequences.  I discussed all findings with the patient and family members if available.  During the discussion, treatment goals were understood by all as well as any misconceptions which were addressed with the patient.  Ample time was given for any questions they may have had.  They are in agreement with the treatment plan as well as final disposition.  [JK]      ED Course User Index  [JK] Julius Blount MD                HEART Score: 3                              Medical Decision Making  This is a 53-year-old male with a history of CAD and anxiety presenting to the emergency department with some chest discomfort.  Patient was recently seen for similar symptoms had a negative workup.  I do believe the patient's symptoms are more consistent with GERD and reflux.  His anxiety is likely exacerbating this as well.  However the patient does have significant history and requires workup.  IV access will be established and the patient.  Based on continuous telemetry monitoring.  Workup initiated.      Differential diagnosis: CAD, ACS, peptic ulcer disease, dyspepsia, pneumonia, bronchitis, atypical chest pain, angina, musculoskeletal pain      Amount and/or Complexity of Data Reviewed  External Data Reviewed: labs, radiology, ECG and notes.     Details: External laboratories, imaging as well as notes were reviewed personally by myself.  All relevant studies were used to guide decision making.     Date of previous record: 2/1/2023    Source of note: Admission record    Summary: Patient was admitted for some biliary dyskinesia.  She does have diffuse cardiology records on file.  I did review multiple laboratory studies as well as previous chest x-ray and EKG.    Labs: ordered. Decision-making details documented in ED Course.  Radiology: ordered and independent interpretation performed. Decision-making details documented in ED Course.  ECG/medicine tests: ordered and independent interpretation performed. Decision-making details documented in ED Course.        Final diagnoses:   Nonspecific chest pain       ED Disposition  ED Disposition       ED Disposition   Discharge    Condition   Stable    Comment   --               Champ Feng MD  [address] [address]  [phone]    Call in 1 day           Medication List      No changes were  made to your prescriptions during this visit.            Julius Blount MD  12/24/23 6130

## 2023-12-25 LAB
QT INTERVAL: 438 MS
QTC INTERVAL: 422 MS

## 2025-02-03 ENCOUNTER — HOSPITAL ENCOUNTER (EMERGENCY)
Facility: HOSPITAL | Age: 55
Discharge: HOME | End: 2025-02-03
Attending: EMERGENCY MEDICINE
Payer: MEDICARE

## 2025-02-03 ENCOUNTER — APPOINTMENT (OUTPATIENT)
Dept: GENERAL RADIOLOGY | Facility: HOSPITAL | Age: 55
End: 2025-02-03
Payer: MEDICARE

## 2025-02-03 VITALS
DIASTOLIC BLOOD PRESSURE: 90 MMHG | SYSTOLIC BLOOD PRESSURE: 124 MMHG | WEIGHT: 210 LBS | HEART RATE: 69 BPM | TEMPERATURE: 98.2 F | RESPIRATION RATE: 16 BRPM | OXYGEN SATURATION: 94 % | BODY MASS INDEX: 33.75 KG/M2 | HEIGHT: 66 IN

## 2025-02-03 DIAGNOSIS — R07.9 CHEST PAIN, UNSPECIFIED TYPE: Primary | ICD-10-CM

## 2025-02-03 DIAGNOSIS — R91.1 PULMONARY NODULE: ICD-10-CM

## 2025-02-03 LAB
ALBUMIN SERPL-MCNC: 4.6 G/DL (ref 3.5–5.2)
ALBUMIN/GLOB SERPL: 1.6 G/DL
ALP SERPL-CCNC: 66 U/L (ref 39–117)
ALT SERPL W P-5'-P-CCNC: 44 U/L (ref 1–41)
ANION GAP SERPL CALCULATED.3IONS-SCNC: 12 MMOL/L (ref 5–15)
AST SERPL-CCNC: 41 U/L (ref 1–40)
BASOPHILS # BLD AUTO: 0.05 10*3/MM3 (ref 0–0.2)
BASOPHILS NFR BLD AUTO: 0.6 % (ref 0–1.5)
BILIRUB SERPL-MCNC: 0.6 MG/DL (ref 0–1.2)
BUN SERPL-MCNC: 18 MG/DL (ref 6–20)
BUN/CREAT SERPL: 17.1 (ref 7–25)
CALCIUM SPEC-SCNC: 10.1 MG/DL (ref 8.6–10.5)
CHLORIDE SERPL-SCNC: 97 MMOL/L (ref 98–107)
CO2 SERPL-SCNC: 27 MMOL/L (ref 22–29)
CREAT SERPL-MCNC: 1.05 MG/DL (ref 0.76–1.27)
DEPRECATED RDW RBC AUTO: 45.2 FL (ref 37–54)
EGFRCR SERPLBLD CKD-EPI 2021: 83.3 ML/MIN/1.73
EOSINOPHIL # BLD AUTO: 0.18 10*3/MM3 (ref 0–0.4)
EOSINOPHIL NFR BLD AUTO: 2 % (ref 0.3–6.2)
ERYTHROCYTE [DISTWIDTH] IN BLOOD BY AUTOMATED COUNT: 13.9 % (ref 12.3–15.4)
GEN 5 1HR TROPONIN T REFLEX: 15 NG/L
GLOBULIN UR ELPH-MCNC: 2.8 GM/DL
GLUCOSE SERPL-MCNC: 144 MG/DL (ref 65–99)
HCT VFR BLD AUTO: 46 % (ref 37.5–51)
HGB BLD-MCNC: 15 G/DL (ref 13–17.7)
HOLD SPECIMEN: NORMAL
IMM GRANULOCYTES # BLD AUTO: 0.04 10*3/MM3 (ref 0–0.05)
IMM GRANULOCYTES NFR BLD AUTO: 0.5 % (ref 0–0.5)
LIPASE SERPL-CCNC: 36 U/L (ref 13–60)
LYMPHOCYTES # BLD AUTO: 3.26 10*3/MM3 (ref 0.7–3.1)
LYMPHOCYTES NFR BLD AUTO: 36.9 % (ref 19.6–45.3)
MCH RBC QN AUTO: 29.4 PG (ref 26.6–33)
MCHC RBC AUTO-ENTMCNC: 32.6 G/DL (ref 31.5–35.7)
MCV RBC AUTO: 90 FL (ref 79–97)
MONOCYTES # BLD AUTO: 0.48 10*3/MM3 (ref 0.1–0.9)
MONOCYTES NFR BLD AUTO: 5.4 % (ref 5–12)
NEUTROPHILS NFR BLD AUTO: 4.82 10*3/MM3 (ref 1.7–7)
NEUTROPHILS NFR BLD AUTO: 54.6 % (ref 42.7–76)
NRBC BLD AUTO-RTO: 0 /100 WBC (ref 0–0.2)
NT-PROBNP SERPL-MCNC: <36 PG/ML (ref 0–900)
PLATELET # BLD AUTO: 208 10*3/MM3 (ref 140–450)
PMV BLD AUTO: 10.6 FL (ref 6–12)
POTASSIUM SERPL-SCNC: 4 MMOL/L (ref 3.5–5.2)
PROT SERPL-MCNC: 7.4 G/DL (ref 6–8.5)
QT INTERVAL: 406 MS
QT INTERVAL: 410 MS
QTC INTERVAL: 441 MS
QTC INTERVAL: 448 MS
RBC # BLD AUTO: 5.11 10*6/MM3 (ref 4.14–5.8)
SODIUM SERPL-SCNC: 136 MMOL/L (ref 136–145)
TROPONIN T NUMERIC DELTA: -3 NG/L
TROPONIN T SERPL HS-MCNC: 18 NG/L
WBC NRBC COR # BLD AUTO: 8.83 10*3/MM3 (ref 3.4–10.8)
WHOLE BLOOD HOLD COAG: NORMAL
WHOLE BLOOD HOLD SPECIMEN: NORMAL

## 2025-02-03 PROCEDURE — 83690 ASSAY OF LIPASE: CPT | Performed by: EMERGENCY MEDICINE

## 2025-02-03 PROCEDURE — 96374 THER/PROPH/DIAG INJ IV PUSH: CPT

## 2025-02-03 PROCEDURE — 84484 ASSAY OF TROPONIN QUANT: CPT | Performed by: EMERGENCY MEDICINE

## 2025-02-03 PROCEDURE — 85025 COMPLETE CBC W/AUTO DIFF WBC: CPT | Performed by: EMERGENCY MEDICINE

## 2025-02-03 PROCEDURE — 99284 EMERGENCY DEPT VISIT MOD MDM: CPT

## 2025-02-03 PROCEDURE — 93005 ELECTROCARDIOGRAM TRACING: CPT | Performed by: EMERGENCY MEDICINE

## 2025-02-03 PROCEDURE — 93005 ELECTROCARDIOGRAM TRACING: CPT

## 2025-02-03 PROCEDURE — 83880 ASSAY OF NATRIURETIC PEPTIDE: CPT | Performed by: EMERGENCY MEDICINE

## 2025-02-03 PROCEDURE — 36415 COLL VENOUS BLD VENIPUNCTURE: CPT

## 2025-02-03 PROCEDURE — 71045 X-RAY EXAM CHEST 1 VIEW: CPT

## 2025-02-03 PROCEDURE — 80053 COMPREHEN METABOLIC PANEL: CPT | Performed by: EMERGENCY MEDICINE

## 2025-02-03 RX ORDER — FAMOTIDINE 10 MG/ML
20 INJECTION, SOLUTION INTRAVENOUS ONCE
Status: COMPLETED | OUTPATIENT
Start: 2025-02-03 | End: 2025-02-03

## 2025-02-03 RX ORDER — PANTOPRAZOLE SODIUM 40 MG/1
40 TABLET, DELAYED RELEASE ORAL ONCE
Status: COMPLETED | OUTPATIENT
Start: 2025-02-03 | End: 2025-02-03

## 2025-02-03 RX ORDER — ALUMINA, MAGNESIA, AND SIMETHICONE 2400; 2400; 240 MG/30ML; MG/30ML; MG/30ML
30 SUSPENSION ORAL ONCE
Status: COMPLETED | OUTPATIENT
Start: 2025-02-03 | End: 2025-02-03

## 2025-02-03 RX ORDER — SUCRALFATE 1 G/1
1 TABLET ORAL 4 TIMES DAILY
Qty: 20 TABLET | Refills: 0 | Status: SHIPPED | OUTPATIENT
Start: 2025-02-03

## 2025-02-03 RX ORDER — SUCRALFATE 1 G/1
1 TABLET ORAL ONCE
Status: COMPLETED | OUTPATIENT
Start: 2025-02-03 | End: 2025-02-03

## 2025-02-03 RX ORDER — SODIUM CHLORIDE 0.9 % (FLUSH) 0.9 %
10 SYRINGE (ML) INJECTION AS NEEDED
Status: DISCONTINUED | OUTPATIENT
Start: 2025-02-03 | End: 2025-02-03 | Stop reason: HOSPADM

## 2025-02-03 RX ORDER — ASPIRIN 81 MG/1
324 TABLET, CHEWABLE ORAL ONCE
Status: COMPLETED | OUTPATIENT
Start: 2025-02-03 | End: 2025-02-03

## 2025-02-03 RX ADMIN — ALUMINUM HYDROXIDE, MAGNESIUM HYDROXIDE, DIMETHICONE 30 ML: 400; 400; 40 SUSPENSION ORAL at 11:11

## 2025-02-03 RX ADMIN — PANTOPRAZOLE SODIUM 40 MG: 40 TABLET, DELAYED RELEASE ORAL at 11:12

## 2025-02-03 RX ADMIN — ASPIRIN 243 MG: 81 TABLET, CHEWABLE ORAL at 11:12

## 2025-02-03 RX ADMIN — FAMOTIDINE 20 MG: 10 INJECTION, SOLUTION INTRAVENOUS at 11:13

## 2025-02-03 RX ADMIN — SUCRALFATE 1 G: 1 TABLET ORAL at 11:12

## 2025-02-03 NOTE — ED PROVIDER NOTES
Subjective   History of Present Illness  History of CAD, 2 stents, and also history of esophageal spasms.  Current pain started 24 hours ago.  Took nitro at 0600 and did have significant improvement of symptoms, but not compete relief.      Last cardiac cath was 13 months ago and a stent was placed.    +nausea  No fever, vomiting, diarrhea.  No aggravating factors, but has not attempted eating, which in the past has made similar symptoms worse.        Review of Systems   All other systems reviewed and are negative.      Past Medical History:   Diagnosis Date    Ambulates with cane     s/p hip replacement    Anxiety     Arthritis     Congenital single kidney     Left kidney only    Coronary artery disease 09/2022    stents x 2    Diabetes mellitus     Elevated cholesterol     GERD (gastroesophageal reflux disease)     Gout     Hyperlipidemia     Lung nodule     Left    Myocardial infarction 2013       No Known Allergies    Past Surgical History:   Procedure Laterality Date    ABDOMINAL MASS RESECTION      CARDIAC CATHETERIZATION N/A 09/14/2022    Procedure: Left Heart Cath;  Surgeon: Bro Rodriguez MD;  Location:  [facility] CATH INVASIVE LOCATION;  Service: Cardiology;  Laterality: N/A;    CHOLECYSTECTOMY WITH INTRAOPERATIVE CHOLANGIOGRAM N/A 2/1/2023    Procedure: CHOLECYSTECTOMY LAPAROSCOPIC INTRAOPERATIVE CHOLANGIOGRAM AND LIVER BIOPSY;  Surgeon: Stanley Gasca MD;  Location:  [facility];  Service: General;  Laterality: N/A;    COLONOSCOPY      CORONARY ANGIOPLASTY WITH STENT PLACEMENT      ESOPHAGEAL MOTILITY STUDY N/A 08/16/2022    Procedure: MANOMETRY;  Surgeon: Stanley Gasca MD;  Location:  [facility];  Service: Gastroenterology;  Laterality: N/A;  LES, per screen read out at 42.6 Patient tolerated esophageal manometry well and probe was advanced to 37cm    GASTRIC PH MONITORING 24HR N/A 08/23/2022    Procedure: GASTRIC PH MONITORING 24HR;  Surgeon: Stanley Gasca MD;  Location:  [facility];   Service: General;  Laterality: N/A;  pH impedance probe dropped to 3.5cm  Les from manometry testing registered at 42.6cm    HIP SURGERY Bilateral     Bilat THR       Family History   Problem Relation Age of Onset    Hypertension Mother     Diabetes Mother     Cancer Mother     Cancer Father     Heart valve disorder Father        Social History     Socioeconomic History    Marital status: Single    Number of children: 0   Tobacco Use    Smoking status: Former     Current packs/day: 0.00     Average packs/day: 0.3 packs/day for 19.0 years (4.8 ttl pk-yrs)     Types: Cigarettes     Start date:      Quit date: 2016     Years since quittin.1    Smokeless tobacco: Never   Vaping Use    Vaping status: Never Used   Substance and Sexual Activity    Alcohol use: Not Currently    Drug use: No    Sexual activity: Defer           Objective   Physical Exam  Vitals and nursing note reviewed.   Constitutional:       General: He is not in acute distress.     Appearance: He is well-developed.   HENT:      Head: Normocephalic and atraumatic.   Eyes:      Conjunctiva/sclera: Conjunctivae normal.      Pupils: Pupils are equal, round, and reactive to light.   Neck:      Thyroid: No thyromegaly.   Cardiovascular:      Rate and Rhythm: Normal rate and regular rhythm.      Heart sounds: Normal heart sounds. No murmur heard.     No friction rub. No gallop.   Pulmonary:      Effort: Pulmonary effort is normal. No respiratory distress.      Breath sounds: Normal breath sounds.   Chest:      Chest wall: No tenderness.   Abdominal:      Palpations: Abdomen is soft.      Tenderness: There is no abdominal tenderness.   Musculoskeletal:         General: Normal range of motion.      Cervical back: Normal range of motion and neck supple.   Lymphadenopathy:      Cervical: No cervical adenopathy.   Skin:     General: Skin is warm and dry.      Capillary Refill: Capillary refill takes less than 2 seconds.   Neurological:      General: No focal  deficit present.      Mental Status: He is alert and oriented to person, place, and time.   Psychiatric:         Behavior: Behavior normal.         Procedures           ED Course      No results found for this or any previous visit (from the past 24 hours).    Note: In addition to lab results from this visit, the labs listed above may include labs taken at another facility or during a different encounter within the last 24 hours. Please correlate lab times with ED admission and discharge times for further clarification of the services performed during this visit.   Latest Reference Range & Units 02/03/25 07:33   HS Troponin T <22 ng/L 18   proBNP 0.0 - 900.0 pg/mL <36.0   Sodium 136 - 145 mmol/L 136   Potassium 3.5 - 5.2 mmol/L 4.0   Chloride 98 - 107 mmol/L 97 (L)   CO2 22.0 - 29.0 mmol/L 27.0   Anion Gap 5.0 - 15.0 mmol/L 12.0   BUN 6 - 20 mg/dL 18   Creatinine 0.76 - 1.27 mg/dL 1.05   BUN/Creatinine Ratio 7.0 - 25.0  17.1   eGFR >60.0 mL/min/1.73 83.3   Glucose 65 - 99 mg/dL 144 (H)   Calcium 8.6 - 10.5 mg/dL 10.1   Alkaline Phosphatase 39 - 117 U/L 66   Total Protein 6.0 - 8.5 g/dL 7.4   Albumin 3.5 - 5.2 g/dL 4.6   Globulin gm/dL 2.8   A/G Ratio g/dL 1.6   AST (SGOT) 1 - 40 U/L 41 (H)   ALT (SGPT) 1 - 41 U/L 44 (H)   Total Bilirubin 0.0 - 1.2 mg/dL 0.6   Lipase 13 - 60 U/L 36   WBC 3.40 - 10.80 10*3/mm3 8.83   RBC 4.14 - 5.80 10*6/mm3 5.11   Hemoglobin 13.0 - 17.7 g/dL 15.0   Hematocrit 37.5 - 51.0 % 46.0   Platelets 140 - 450 10*3/mm3 208   RDW 12.3 - 15.4 % 13.9   MCV 79.0 - 97.0 fL 90.0   MCH 26.6 - 33.0 pg 29.4   MCHC 31.5 - 35.7 g/dL 32.6   MPV 6.0 - 12.0 fL 10.6   RDW-SD 37.0 - 54.0 fl 45.2   Neutrophil Rel % 42.7 - 76.0 % 54.6   Lymphocyte Rel % 19.6 - 45.3 % 36.9   Monocyte Rel % 5.0 - 12.0 % 5.4   Eosinophil Rel % 0.3 - 6.2 % 2.0   Basophil Rel % 0.0 - 1.5 % 0.6   Immature Granulocyte Rel % 0.0 - 0.5 % 0.5   Neutrophils Absolute 1.70 - 7.00 10*3/mm3 4.82   Lymphocytes Absolute 0.70 - 3.10 10*3/mm3 3.26  (H)   Monocytes Absolute 0.10 - 0.90 10*3/mm3 0.48   Eosinophils Absolute 0.00 - 0.40 10*3/mm3 0.18   Basophils Absolute 0.00 - 0.20 10*3/mm3 0.05   Immature Grans, Absolute 0.00 - 0.05 10*3/mm3 0.04   nRBC 0.0 - 0.2 /100 WBC 0.0   (L): Data is abnormally low  (H): Data is abnormally high  XR Chest 1 View   Final Result   Impression:   1.No acute radiographic abnormality is identified.   2.Known left upper lobe lung nodule. Please refer to the chest CT report from 9/14/2022.         Electronically Signed: Mango Howell MD     2/3/2025 8:28 AM EST     Workstation ID: [id number]        Vitals:    02/03/25 1200 02/03/25 1215 02/03/25 1230 02/03/25 1245   BP: 111/87 120/82 112/83 124/90   BP Location:       Patient Position:       Pulse: 79 65 70 69   Resp:       Temp:       TempSrc:       SpO2: 92% 95% 92% 94%   Weight:       Height:         Medications   aspirin chewable tablet 324 mg (243 mg Oral Given 2/3/25 1112)   sucralfate (CARAFATE) tablet 1 g (1 g Oral Given 2/3/25 1112)   famotidine (PEPCID) injection 20 mg (20 mg Intravenous Given 2/3/25 1113)   pantoprazole (PROTONIX) EC tablet 40 mg (40 mg Oral Given 2/3/25 1112)   aluminum-magnesium hydroxide-simethicone (MAALOX MAX) 400-400-40 MG/5ML suspension 30 mL (30 mL Oral Given 2/3/25 1111)     ECG/EMG Results (last 24 hours)       Procedure Component Value Units Date/Time    ECG 12 Lead ED Triage Standing Order; Chest Pain [[id number]] Collected: 02/03/25 0722     Updated: 02/03/25 0722     QT Interval 410 ms      QTC Interval 448 ms     Narrative:      Test Reason : ED Triage Standing Order~  Blood Pressure :   */*   mmHG  Vent. Rate :  72 BPM     Atrial Rate :  72 BPM     P-R Int : 166 ms          QRS Dur :  86 ms      QT Int : 410 ms       P-R-T Axes :  21 -10  48 degrees    QTcB Int : 448 ms    Normal sinus rhythm  Normal ECG  When compared with ECG of 24-Dec-2023 00:11,  No significant change was found    Referred By: ED           Confirmed By:           ECG  12 Lead ED Triage Standing Order; Chest Pain   Final Result   Test Reason : ED Triage Standing Order~   Blood Pressure :   */*   mmHG   Vent. Rate :  71 BPM     Atrial Rate :  71 BPM      P-R Int : 166 ms          QRS Dur :  84 ms       QT Int : 406 ms       P-R-T Axes :  31  -4  41 degrees     QTcB Int : 441 ms      Normal sinus rhythm   Inferior infarct , age undetermined   Abnormal ECG   When compared with ECG of 03-Feb-2025 07:22, (Unconfirmed)   No significant change was found   Confirmed by MD GUERRERO CORY ([id number]) on 2/3/2025 11:17:24 AM      Referred By: EDMD           Confirmed By: BROOKE GUERRERO MD      ECG 12 Lead ED Triage Standing Order; Chest Pain   Final Result   Test Reason : ED Triage Standing Order~   Blood Pressure :   */*   mmHG   Vent. Rate :  72 BPM     Atrial Rate :  72 BPM      P-R Int : 166 ms          QRS Dur :  86 ms       QT Int : 410 ms       P-R-T Axes :  21 -10  48 degrees     QTcB Int : 448 ms      Normal sinus rhythm   Normal ECG   When compared with ECG of 24-Dec-2023 00:11,   No significant change was found   Confirmed by MD GUERRERO CORY ([id number]) on 2/3/2025 11:16:47 AM      Referred By: ED           Confirmed By: BROOKE GUERRERO MD                                                           Medical Decision Making  Complex presentation with complex decision making. Differential including ACS, aortic dissection, cardiac tamponade, coronary artery dissection, esophageal perforation, pulmonary embolism, tension pneumothorax, cholecystitis, mediastinitis, myocardial rupture, myocarditis, pancreatitis, pericarditis, pneumothorax, along with non emergent etiologies fully considered.  Combination of a thorough history, examination, and complex medical decision making excluded multiple etiologies.   Extensive workup including imaging and labs, as discussed separately, performed and emergent condition managed as noted.    The patient was evaluated during a capacity surge environment. This includes  evaluation in non-traditional EM settings as well as potential care, result, disposition delays. I have made every effort to evaluate and care for this patient as efficiently and thoroughly as possible.      Problems Addressed:  Chest pain, unspecified type: complicated acute illness or injury  Pulmonary nodule: complicated acute illness or injury    Amount and/or Complexity of Data Reviewed  External Data Reviewed: notes.  Labs: ordered. Decision-making details documented in ED Course.  Radiology: ordered and independent interpretation performed. Decision-making details documented in ED Course.  ECG/medicine tests: ordered and independent interpretation performed.    Risk  OTC drugs.  Prescription drug management.        Final diagnoses:   Chest pain, unspecified type   Pulmonary nodule       ED Disposition  ED Disposition       ED Disposition   Discharge    Condition   Stable    Comment   --           .[email or web address]  DISCHARGE    Patient discharged in stable condition.    Reviewed implications of results, diagnosis, meds, responsibility to follow up, warning signs and symptoms of possible worsening, potential complications and reasons to return to ER.    Patient/Family voiced understanding of above instructions.    Discussed plan for discharge, as there is no emergent indication for admission.  Pt/family is agreeable and understands need for follow up and possible repeat testing.  Pt/family is aware that discharge does not mean that nothing is wrong but that it indicates no emergency is currently present that requires admission and they must continue care with follow-up as given below or with a physician of their choice.     FOLLOW-UP  BeitingChamp MD  [address] [address] [phone]    Schedule an appointment as soon as possible for a visit       [facility] EMERGENCY DEPARTMENT  [address]  [facility] [address] [phone]    If symptoms  Crow Horton MD  [address] [id number]  [phone]    Schedule an appointment as soon as possible for a visit            Medication List        New Prescriptions      sucralfate 1 g tablet  Commonly known as: CARAFATE  Take 1 tablet by mouth 4 (Four) Times a Day.               Where to Get Your Medications        These medications were sent to [facility] [id number] - [address] AT [address] [phone] [id number] [address]      Phone: [phone]   sucralfate 1 g tablet            Costa Stone DO  02/07/25 4144

## 2025-02-18 ENCOUNTER — HOSPITAL ENCOUNTER (OUTPATIENT)
Facility: HOSPITAL | Age: 55
Discharge: HOME | End: 2025-02-18
Attending: EMERGENCY MEDICINE | Admitting: STUDENT IN AN ORGANIZED HEALTH CARE EDUCATION/TRAINING PROGRAM
Payer: MEDICARE

## 2025-02-18 ENCOUNTER — APPOINTMENT (OUTPATIENT)
Dept: GENERAL RADIOLOGY | Facility: HOSPITAL | Age: 55
End: 2025-02-18
Payer: MEDICARE

## 2025-02-18 VITALS
OXYGEN SATURATION: 92 % | SYSTOLIC BLOOD PRESSURE: 118 MMHG | RESPIRATION RATE: 16 BRPM | HEART RATE: 60 BPM | BODY MASS INDEX: 33.75 KG/M2 | DIASTOLIC BLOOD PRESSURE: 78 MMHG | TEMPERATURE: 98.1 F | HEIGHT: 66 IN | WEIGHT: 210 LBS

## 2025-02-18 DIAGNOSIS — R94.39 ABNORMAL STRESS TEST: ICD-10-CM

## 2025-02-18 DIAGNOSIS — R07.9 ACUTE CHEST PAIN: ICD-10-CM

## 2025-02-18 DIAGNOSIS — Z87.891 FORMER SMOKER: ICD-10-CM

## 2025-02-18 DIAGNOSIS — I20.89 ANGINAL EQUIVALENT: ICD-10-CM

## 2025-02-18 DIAGNOSIS — R06.02 SHORTNESS OF BREATH: ICD-10-CM

## 2025-02-18 DIAGNOSIS — Z86.39 HISTORY OF HYPERLIPIDEMIA: ICD-10-CM

## 2025-02-18 DIAGNOSIS — R07.9 RECURRENT CHEST PAIN: Primary | ICD-10-CM

## 2025-02-18 DIAGNOSIS — Z86.79 HISTORY OF CORONARY ARTERY DISEASE: ICD-10-CM

## 2025-02-18 DIAGNOSIS — E11.65 TYPE 2 DIABETES MELLITUS WITH HYPERGLYCEMIA, WITHOUT LONG-TERM CURRENT USE OF INSULIN: ICD-10-CM

## 2025-02-18 DIAGNOSIS — Z86.79 HISTORY OF HYPERTENSION: ICD-10-CM

## 2025-02-18 DIAGNOSIS — R11.0 NAUSEA: ICD-10-CM

## 2025-02-18 PROBLEM — R91.1 LUNG NODULE: Status: RESOLVED | Noted: 2019-11-18 | Resolved: 2025-02-18

## 2025-02-18 PROBLEM — I25.10 CAD (CORONARY ARTERY DISEASE): Status: ACTIVE | Noted: 2025-02-18

## 2025-02-18 PROBLEM — R00.1 SYMPTOMATIC BRADYCARDIA: Status: RESOLVED | Noted: 2022-10-09 | Resolved: 2025-02-18

## 2025-02-18 PROBLEM — E11.9 T2DM (TYPE 2 DIABETES MELLITUS): Status: ACTIVE | Noted: 2025-02-18

## 2025-02-18 PROBLEM — K82.8 BILIARY DYSKINESIA: Status: RESOLVED | Noted: 2023-02-01 | Resolved: 2025-02-18

## 2025-02-18 PROBLEM — E86.0 DEHYDRATION: Status: RESOLVED | Noted: 2022-10-09 | Resolved: 2025-02-18

## 2025-02-18 PROBLEM — Z87.898 HISTORY OF PREDIABETES: Status: RESOLVED | Noted: 2022-10-09 | Resolved: 2025-02-18

## 2025-02-18 PROBLEM — I25.110 CORONARY ARTERY DISEASE INVOLVING NATIVE HEART WITH UNSTABLE ANGINA PECTORIS: Status: RESOLVED | Noted: 2022-09-13 | Resolved: 2025-02-18

## 2025-02-18 LAB
ALBUMIN SERPL-MCNC: 4.6 G/DL (ref 3.5–5.2)
ALBUMIN/GLOB SERPL: 1.6 G/DL
ALP SERPL-CCNC: 72 U/L (ref 39–117)
ALT SERPL W P-5'-P-CCNC: 35 U/L (ref 1–41)
ANION GAP SERPL CALCULATED.3IONS-SCNC: 14 MMOL/L (ref 5–15)
APTT PPP: 28 SECONDS (ref 22–39)
AST SERPL-CCNC: 28 U/L (ref 1–40)
BASOPHILS # BLD AUTO: 0.03 10*3/MM3 (ref 0–0.2)
BASOPHILS NFR BLD AUTO: 0.3 % (ref 0–1.5)
BILIRUB SERPL-MCNC: 0.5 MG/DL (ref 0–1.2)
BUN SERPL-MCNC: 19 MG/DL (ref 6–20)
BUN/CREAT SERPL: 17.6 (ref 7–25)
CALCIUM SPEC-SCNC: 9.9 MG/DL (ref 8.6–10.5)
CHLORIDE SERPL-SCNC: 103 MMOL/L (ref 98–107)
CHOLEST SERPL-MCNC: 80 MG/DL (ref 0–200)
CO2 SERPL-SCNC: 22 MMOL/L (ref 22–29)
CREAT SERPL-MCNC: 1.08 MG/DL (ref 0.76–1.27)
DEPRECATED RDW RBC AUTO: 43.7 FL (ref 37–54)
EGFRCR SERPLBLD CKD-EPI 2021: 80 ML/MIN/1.73
EOSINOPHIL # BLD AUTO: 0.15 10*3/MM3 (ref 0–0.4)
EOSINOPHIL NFR BLD AUTO: 1.7 % (ref 0.3–6.2)
ERYTHROCYTE [DISTWIDTH] IN BLOOD BY AUTOMATED COUNT: 13.5 % (ref 12.3–15.4)
GEN 5 1HR TROPONIN T REFLEX: 11 NG/L
GLOBULIN UR ELPH-MCNC: 2.8 GM/DL
GLUCOSE BLDC GLUCOMTR-MCNC: 119 MG/DL (ref 70–130)
GLUCOSE BLDC GLUCOMTR-MCNC: 119 MG/DL (ref 70–130)
GLUCOSE SERPL-MCNC: 214 MG/DL (ref 65–99)
HBA1C MFR BLD: 7.5 % (ref 4.8–5.6)
HCT VFR BLD AUTO: 45.5 % (ref 37.5–51)
HDLC SERPL-MCNC: 32 MG/DL (ref 40–60)
HGB BLD-MCNC: 15.2 G/DL (ref 13–17.7)
HOLD SPECIMEN: NORMAL
IMM GRANULOCYTES # BLD AUTO: 0.03 10*3/MM3 (ref 0–0.05)
IMM GRANULOCYTES NFR BLD AUTO: 0.3 % (ref 0–0.5)
INR PPP: 1.02 (ref 0.89–1.12)
LDLC SERPL CALC-MCNC: 14 MG/DL (ref 0–100)
LDLC/HDLC SERPL: 0.1 {RATIO}
LIPASE SERPL-CCNC: 46 U/L (ref 13–60)
LV EF ANGIOGRAM EST: 50 %
LYMPHOCYTES # BLD AUTO: 3.37 10*3/MM3 (ref 0.7–3.1)
LYMPHOCYTES NFR BLD AUTO: 39 % (ref 19.6–45.3)
MAGNESIUM SERPL-MCNC: 2.1 MG/DL (ref 1.6–2.6)
MCH RBC QN AUTO: 29.6 PG (ref 26.6–33)
MCHC RBC AUTO-ENTMCNC: 33.4 G/DL (ref 31.5–35.7)
MCV RBC AUTO: 88.7 FL (ref 79–97)
MONOCYTES # BLD AUTO: 0.37 10*3/MM3 (ref 0.1–0.9)
MONOCYTES NFR BLD AUTO: 4.3 % (ref 5–12)
NEUTROPHILS NFR BLD AUTO: 4.69 10*3/MM3 (ref 1.7–7)
NEUTROPHILS NFR BLD AUTO: 54.4 % (ref 42.7–76)
NRBC BLD AUTO-RTO: 0 /100 WBC (ref 0–0.2)
NT-PROBNP SERPL-MCNC: <36 PG/ML (ref 0–900)
PLATELET # BLD AUTO: 222 10*3/MM3 (ref 140–450)
PMV BLD AUTO: 10.6 FL (ref 6–12)
POTASSIUM SERPL-SCNC: 4 MMOL/L (ref 3.5–5.2)
PROT SERPL-MCNC: 7.4 G/DL (ref 6–8.5)
PROTHROMBIN TIME: 13.5 SECONDS (ref 12.2–14.5)
QT INTERVAL: 412 MS
QTC INTERVAL: 425 MS
RBC # BLD AUTO: 5.13 10*6/MM3 (ref 4.14–5.8)
SODIUM SERPL-SCNC: 139 MMOL/L (ref 136–145)
TRIGL SERPL-MCNC: 224 MG/DL (ref 0–150)
TROPONIN T NUMERIC DELTA: 0 NG/L
TROPONIN T SERPL HS-MCNC: 11 NG/L
TROPONIN T SERPL HS-MCNC: 11 NG/L
TSH SERPL DL<=0.05 MIU/L-ACNC: 3.9 UIU/ML (ref 0.27–4.2)
VLDLC SERPL-MCNC: 34 MG/DL (ref 5–40)
WBC NRBC COR # BLD AUTO: 8.64 10*3/MM3 (ref 3.4–10.8)
WHOLE BLOOD HOLD COAG: NORMAL
WHOLE BLOOD HOLD SPECIMEN: NORMAL

## 2025-02-18 PROCEDURE — 25010000002 HEPARIN (PORCINE) PER 1000 UNITS: Performed by: INTERNAL MEDICINE

## 2025-02-18 PROCEDURE — 93458 L HRT ARTERY/VENTRICLE ANGIO: CPT | Performed by: INTERNAL MEDICINE

## 2025-02-18 PROCEDURE — 93005 ELECTROCARDIOGRAM TRACING: CPT | Performed by: NURSE PRACTITIONER

## 2025-02-18 PROCEDURE — 99235 HOSP IP/OBS SAME DATE MOD 70: CPT | Performed by: STUDENT IN AN ORGANIZED HEALTH CARE EDUCATION/TRAINING PROGRAM

## 2025-02-18 PROCEDURE — 85730 THROMBOPLASTIN TIME PARTIAL: CPT | Performed by: NURSE PRACTITIONER

## 2025-02-18 PROCEDURE — 63710000001 PANTOPRAZOLE 40 MG TABLET DELAYED-RELEASE: Performed by: NURSE PRACTITIONER

## 2025-02-18 PROCEDURE — 85025 COMPLETE CBC W/AUTO DIFF WBC: CPT | Performed by: EMERGENCY MEDICINE

## 2025-02-18 PROCEDURE — C1769 GUIDE WIRE: HCPCS | Performed by: INTERNAL MEDICINE

## 2025-02-18 PROCEDURE — G0378 HOSPITAL OBSERVATION PER HR: HCPCS

## 2025-02-18 PROCEDURE — 85610 PROTHROMBIN TIME: CPT | Performed by: NURSE PRACTITIONER

## 2025-02-18 PROCEDURE — 25010000002 LIDOCAINE 1 % SOLUTION: Performed by: INTERNAL MEDICINE

## 2025-02-18 PROCEDURE — 25810000003 SODIUM CHLORIDE 0.9 % SOLUTION: Performed by: INTERNAL MEDICINE

## 2025-02-18 PROCEDURE — 71045 X-RAY EXAM CHEST 1 VIEW: CPT

## 2025-02-18 PROCEDURE — 83690 ASSAY OF LIPASE: CPT | Performed by: EMERGENCY MEDICINE

## 2025-02-18 PROCEDURE — 84443 ASSAY THYROID STIM HORMONE: CPT | Performed by: NURSE PRACTITIONER

## 2025-02-18 PROCEDURE — 84484 ASSAY OF TROPONIN QUANT: CPT | Performed by: NURSE PRACTITIONER

## 2025-02-18 PROCEDURE — 93010 ELECTROCARDIOGRAM REPORT: CPT | Performed by: INTERNAL MEDICINE

## 2025-02-18 PROCEDURE — 83735 ASSAY OF MAGNESIUM: CPT | Performed by: NURSE PRACTITIONER

## 2025-02-18 PROCEDURE — 25510000001 IOPAMIDOL PER 1 ML: Performed by: INTERNAL MEDICINE

## 2025-02-18 PROCEDURE — 93005 ELECTROCARDIOGRAM TRACING: CPT | Performed by: EMERGENCY MEDICINE

## 2025-02-18 PROCEDURE — 84484 ASSAY OF TROPONIN QUANT: CPT | Performed by: EMERGENCY MEDICINE

## 2025-02-18 PROCEDURE — 83880 ASSAY OF NATRIURETIC PEPTIDE: CPT | Performed by: EMERGENCY MEDICINE

## 2025-02-18 PROCEDURE — 82948 REAGENT STRIP/BLOOD GLUCOSE: CPT

## 2025-02-18 PROCEDURE — A9270 NON-COVERED ITEM OR SERVICE: HCPCS | Performed by: NURSE PRACTITIONER

## 2025-02-18 PROCEDURE — 80053 COMPREHEN METABOLIC PANEL: CPT | Performed by: EMERGENCY MEDICINE

## 2025-02-18 PROCEDURE — 80061 LIPID PANEL: CPT | Performed by: NURSE PRACTITIONER

## 2025-02-18 PROCEDURE — 25010000002 FENTANYL CITRATE (PF) 50 MCG/ML SOLUTION: Performed by: INTERNAL MEDICINE

## 2025-02-18 PROCEDURE — C1894 INTRO/SHEATH, NON-LASER: HCPCS | Performed by: INTERNAL MEDICINE

## 2025-02-18 PROCEDURE — 36415 COLL VENOUS BLD VENIPUNCTURE: CPT

## 2025-02-18 PROCEDURE — 99285 EMERGENCY DEPT VISIT HI MDM: CPT

## 2025-02-18 PROCEDURE — 25010000002 NICARDIPINE 2.5 MG/ML SOLUTION: Performed by: INTERNAL MEDICINE

## 2025-02-18 PROCEDURE — 83036 HEMOGLOBIN GLYCOSYLATED A1C: CPT | Performed by: NURSE PRACTITIONER

## 2025-02-18 PROCEDURE — 25810000003 SODIUM CHLORIDE 0.9 % SOLUTION: Performed by: NURSE PRACTITIONER

## 2025-02-18 PROCEDURE — 25010000002 MIDAZOLAM PER 1 MG: Performed by: INTERNAL MEDICINE

## 2025-02-18 RX ORDER — SODIUM CHLORIDE 0.9 % (FLUSH) 0.9 %
10 SYRINGE (ML) INJECTION AS NEEDED
Status: DISCONTINUED | OUTPATIENT
Start: 2025-02-18 | End: 2025-02-18 | Stop reason: HOSPADM

## 2025-02-18 RX ORDER — DEXTROSE MONOHYDRATE 25 G/50ML
25 INJECTION, SOLUTION INTRAVENOUS
Status: DISCONTINUED | OUTPATIENT
Start: 2025-02-18 | End: 2025-02-18 | Stop reason: HOSPADM

## 2025-02-18 RX ORDER — HEPARIN SODIUM 1000 [USP'U]/ML
INJECTION, SOLUTION INTRAVENOUS; SUBCUTANEOUS
Status: DISCONTINUED | OUTPATIENT
Start: 2025-02-18 | End: 2025-02-18 | Stop reason: HOSPADM

## 2025-02-18 RX ORDER — AMOXICILLIN 250 MG
2 CAPSULE ORAL 2 TIMES DAILY PRN
Status: DISCONTINUED | OUTPATIENT
Start: 2025-02-18 | End: 2025-02-18 | Stop reason: HOSPADM

## 2025-02-18 RX ORDER — PRASUGREL 10 MG/1
10 TABLET, FILM COATED ORAL DAILY
Qty: 30 TABLET | Refills: 0 | Status: SHIPPED | OUTPATIENT
Start: 2025-02-19 | End: 2025-03-21

## 2025-02-18 RX ORDER — NITROGLYCERIN 0.4 MG/1
0.4 TABLET SUBLINGUAL
Status: DISCONTINUED | OUTPATIENT
Start: 2025-02-18 | End: 2025-02-18 | Stop reason: HOSPADM

## 2025-02-18 RX ORDER — POLYETHYLENE GLYCOL 3350 17 G/17G
17 POWDER, FOR SOLUTION ORAL DAILY PRN
Status: DISCONTINUED | OUTPATIENT
Start: 2025-02-18 | End: 2025-02-18 | Stop reason: HOSPADM

## 2025-02-18 RX ORDER — IOPAMIDOL 755 MG/ML
INJECTION, SOLUTION INTRAVASCULAR
Status: DISCONTINUED | OUTPATIENT
Start: 2025-02-18 | End: 2025-02-18 | Stop reason: HOSPADM

## 2025-02-18 RX ORDER — BISACODYL 5 MG/1
5 TABLET, DELAYED RELEASE ORAL DAILY PRN
Status: DISCONTINUED | OUTPATIENT
Start: 2025-02-18 | End: 2025-02-18 | Stop reason: HOSPADM

## 2025-02-18 RX ORDER — PRASUGREL 10 MG/1
10 TABLET, FILM COATED ORAL DAILY
Status: DISCONTINUED | OUTPATIENT
Start: 2025-02-18 | End: 2025-02-18 | Stop reason: HOSPADM

## 2025-02-18 RX ORDER — PANTOPRAZOLE SODIUM 40 MG/1
40 TABLET, DELAYED RELEASE ORAL
Status: DISCONTINUED | OUTPATIENT
Start: 2025-02-18 | End: 2025-02-18 | Stop reason: HOSPADM

## 2025-02-18 RX ORDER — ACETAMINOPHEN 325 MG/1
650 TABLET ORAL EVERY 4 HOURS PRN
Status: DISCONTINUED | OUTPATIENT
Start: 2025-02-18 | End: 2025-02-18 | Stop reason: HOSPADM

## 2025-02-18 RX ORDER — SODIUM CHLORIDE 0.9 % (FLUSH) 0.9 %
10 SYRINGE (ML) INJECTION EVERY 12 HOURS SCHEDULED
Status: DISCONTINUED | OUTPATIENT
Start: 2025-02-18 | End: 2025-02-18 | Stop reason: HOSPADM

## 2025-02-18 RX ORDER — FENTANYL CITRATE 50 UG/ML
INJECTION, SOLUTION INTRAMUSCULAR; INTRAVENOUS
Status: DISCONTINUED | OUTPATIENT
Start: 2025-02-18 | End: 2025-02-18 | Stop reason: HOSPADM

## 2025-02-18 RX ORDER — SODIUM CHLORIDE 9 MG/ML
40 INJECTION, SOLUTION INTRAVENOUS AS NEEDED
Status: DISCONTINUED | OUTPATIENT
Start: 2025-02-18 | End: 2025-02-18 | Stop reason: HOSPADM

## 2025-02-18 RX ORDER — ASPIRIN 81 MG/1
324 TABLET, CHEWABLE ORAL ONCE
Status: COMPLETED | OUTPATIENT
Start: 2025-02-18 | End: 2025-02-18

## 2025-02-18 RX ORDER — IBUPROFEN 600 MG/1
1 TABLET ORAL
Status: DISCONTINUED | OUTPATIENT
Start: 2025-02-18 | End: 2025-02-18 | Stop reason: HOSPADM

## 2025-02-18 RX ORDER — ACETAMINOPHEN 325 MG/1
650 TABLET ORAL EVERY 4 HOURS PRN
Status: DISCONTINUED | OUTPATIENT
Start: 2025-02-18 | End: 2025-02-18

## 2025-02-18 RX ORDER — NICOTINE POLACRILEX 4 MG
15 LOZENGE BUCCAL
Status: DISCONTINUED | OUTPATIENT
Start: 2025-02-18 | End: 2025-02-18 | Stop reason: HOSPADM

## 2025-02-18 RX ORDER — BISACODYL 10 MG
10 SUPPOSITORY, RECTAL RECTAL DAILY PRN
Status: DISCONTINUED | OUTPATIENT
Start: 2025-02-18 | End: 2025-02-18 | Stop reason: HOSPADM

## 2025-02-18 RX ORDER — LIDOCAINE HYDROCHLORIDE 10 MG/ML
INJECTION, SOLUTION INFILTRATION; PERINEURAL
Status: DISCONTINUED | OUTPATIENT
Start: 2025-02-18 | End: 2025-02-18 | Stop reason: HOSPADM

## 2025-02-18 RX ORDER — SODIUM CHLORIDE 9 MG/ML
75 INJECTION, SOLUTION INTRAVENOUS CONTINUOUS
Status: DISCONTINUED | OUTPATIENT
Start: 2025-02-18 | End: 2025-02-18 | Stop reason: HOSPADM

## 2025-02-18 RX ORDER — ROSUVASTATIN CALCIUM 20 MG/1
40 TABLET, COATED ORAL NIGHTLY
Status: DISCONTINUED | OUTPATIENT
Start: 2025-02-18 | End: 2025-02-18 | Stop reason: HOSPADM

## 2025-02-18 RX ORDER — HYDROXYZINE HYDROCHLORIDE 25 MG/1
25 TABLET, FILM COATED ORAL 3 TIMES DAILY PRN
Status: DISCONTINUED | OUTPATIENT
Start: 2025-02-18 | End: 2025-02-18 | Stop reason: HOSPADM

## 2025-02-18 RX ORDER — ASPIRIN 81 MG/1
81 TABLET ORAL DAILY
Status: DISCONTINUED | OUTPATIENT
Start: 2025-02-19 | End: 2025-02-18 | Stop reason: HOSPADM

## 2025-02-18 RX ORDER — MIDAZOLAM HYDROCHLORIDE 1 MG/ML
INJECTION, SOLUTION INTRAMUSCULAR; INTRAVENOUS
Status: DISCONTINUED | OUTPATIENT
Start: 2025-02-18 | End: 2025-02-18 | Stop reason: HOSPADM

## 2025-02-18 RX ORDER — ONDANSETRON 2 MG/ML
4 INJECTION INTRAMUSCULAR; INTRAVENOUS EVERY 6 HOURS PRN
Status: DISCONTINUED | OUTPATIENT
Start: 2025-02-18 | End: 2025-02-18 | Stop reason: HOSPADM

## 2025-02-18 RX ADMIN — PANTOPRAZOLE SODIUM 40 MG: 40 TABLET, DELAYED RELEASE ORAL at 07:44

## 2025-02-18 RX ADMIN — SODIUM CHLORIDE 75 ML/HR: 9 INJECTION, SOLUTION INTRAVENOUS at 07:44

## 2025-02-18 RX ADMIN — ASPIRIN 81 MG 324 MG: 81 TABLET ORAL at 02:06

## 2025-02-18 NOTE — PLAN OF CARE
Problem: Adult Inpatient Plan of Care  Goal: Plan of Care Review  Outcome: Adequate for Care Transition  Goal: Patient-Specific Goal (Individualized)  Outcome: Adequate for Care Transition  Goal: Absence of Hospital-Acquired Illness or Injury  Outcome: Adequate for Care Transition  Intervention: Identify and Manage Fall Risk  Recent Flowsheet Documentation  Taken 2/18/2025 1607 by Jackie Zhang RN  Safety Promotion/Fall Prevention:   activity supervised   assistive device/personal items within reach   clutter free environment maintained   lighting adjusted   nonskid shoes/slippers when out of bed   room organization consistent   safety round/check completed  Taken 2/18/2025 1452 by Jackie Zhang RN  Safety Promotion/Fall Prevention:   activity supervised   assistive device/personal items within reach   clutter free environment maintained   lighting adjusted   nonskid shoes/slippers when out of bed   room organization consistent   safety round/check completed  Taken 2/18/2025 1200 by Jackie Zhang RN  Safety Promotion/Fall Prevention:   activity supervised   assistive device/personal items within reach   clutter free environment maintained   lighting adjusted   nonskid shoes/slippers when out of bed   room organization consistent   safety round/check completed  Taken 2/18/2025 1000 by Jackie Zhang RN  Safety Promotion/Fall Prevention:   activity supervised   assistive device/personal items within reach   clutter free environment maintained   lighting adjusted   nonskid shoes/slippers when out of bed   room organization consistent   safety round/check completed  Taken 2/18/2025 0907 by Jackie Zhang RN  Safety Promotion/Fall Prevention:   assistive device/personal items within reach   clutter free environment maintained   lighting adjusted   nonskid shoes/slippers when out of bed   room organization consistent   safety round/check completed  Intervention: Prevent Skin Injury  Recent Flowsheet  Documentation  Taken 2/18/2025 1607 by Jackie Zhang RN  Body Position: position changed independently  Skin Protection:   protective footwear used   transparent dressing maintained   incontinence pads utilized  Taken 2/18/2025 1452 by Jackie Zhang RN  Body Position: position changed independently  Skin Protection:   protective footwear used   transparent dressing maintained   incontinence pads utilized  Taken 2/18/2025 1200 by Jackie Zhang RN  Body Position: position changed independently  Skin Protection:   protective footwear used   transparent dressing maintained   incontinence pads utilized  Taken 2/18/2025 1000 by Jackie Zhang RN  Body Position: position changed independently  Skin Protection:   protective footwear used   transparent dressing maintained   incontinence pads utilized  Taken 2/18/2025 0907 by Jackie Zhang RN  Body Position: position changed independently  Skin Protection:   incontinence pads utilized   protective footwear used   transparent dressing maintained  Intervention: Prevent Infection  Recent Flowsheet Documentation  Taken 2/18/2025 1607 by Jackie Zhang RN  Infection Prevention:   environmental surveillance performed   equipment surfaces disinfected   hand hygiene promoted  Taken 2/18/2025 1452 by Jackie Zhang RN  Infection Prevention:   environmental surveillance performed   equipment surfaces disinfected   hand hygiene promoted  Taken 2/18/2025 1200 by Jackie Zhang RN  Infection Prevention:   environmental surveillance performed   equipment surfaces disinfected   hand hygiene promoted  Taken 2/18/2025 1000 by Jackie Zhang RN  Infection Prevention:   environmental surveillance performed   equipment surfaces disinfected   hand hygiene promoted  Taken 2/18/2025 0907 by Jackie Zhang RN  Infection Prevention:   environmental surveillance performed   equipment surfaces disinfected   hand hygiene promoted  Goal: Optimal Comfort and Wellbeing  Outcome: Adequate for Care  Transition  Intervention: Provide Person-Centered Care  Recent Flowsheet Documentation  Taken 2/18/2025 1607 by Jackie Zhang RN  Trust Relationship/Rapport:   care explained   questions answered   questions encouraged   thoughts/feelings acknowledged  Taken 2/18/2025 1452 by Jackie Zhang RN  Trust Relationship/Rapport:   care explained   questions answered   questions encouraged   thoughts/feelings acknowledged  Taken 2/18/2025 1200 by Jackie Zhang RN  Trust Relationship/Rapport:   care explained   questions answered   questions encouraged   thoughts/feelings acknowledged  Taken 2/18/2025 1000 by Jackie Zhang RN  Trust Relationship/Rapport:   care explained   questions answered   questions encouraged   thoughts/feelings acknowledged  Taken 2/18/2025 0907 by Jackie Zhang RN  Trust Relationship/Rapport:   care explained   emotional support provided   questions answered   questions encouraged   thoughts/feelings acknowledged  Goal: Readiness for Transition of Care  Outcome: Adequate for Care Transition  Intervention: Mutually Develop Transition Plan  Recent Flowsheet Documentation  Taken 2/18/2025 0907 by Jackie Zhang RN  Transportation Anticipated: family or friend will provide  Patient/Family Anticipated Services at Transition: none  Patient/Family Anticipates Transition to: home with family     Problem: Comorbidity Management  Goal: Blood Glucose Level Within Target Range  Outcome: Adequate for Care Transition  Intervention: Monitor and Manage Glycemia  Recent Flowsheet Documentation  Taken 2/18/2025 0907 by Jackie Zhang RN  Medication Review/Management: medications reviewed   Goal Outcome Evaluation:

## 2025-02-18 NOTE — ED PROVIDER NOTES
Subjective   History of Present Illness  This is a 55-year-old male that presents to the ER with waxing and waning persistent left-sided chest pain without radiation x 2 weeks.  Patient has personal history of hypertension, hyperlipidemia, CAD with 2 previous cardiac stents, type 2 diabetes mellitus, history of gout, anxiety, GERD, osteoarthritis, and former tobacco abuse.  Patient says that he has been seen in the ER x 3, including 2/3/2025 here at [facility], 2/13/2025 at [facility], and again tonight.  Patient follows with routine cardiologist, Dr. Kelly and has had 2 previous cardiac stents.  The initial stent was approximately 12 years ago and patient has been second stent in 2023.  Patient says that cardiologist set him up for a chemical stress test on 2/12/2025 and he was told by midlevel that stress testing was abnormal and patient is awaiting to be set up for left heart catheterization.  Patient says that he has been having persistent left-sided chest pressure as well as associated shortness of breath and nausea.  With recent abnormal stress test, he is quite concerned.  He has been taking his Toprol XL 25 mg by mouth daily, Crestor, aspirin 81 mg by mouth daily, and Effient.  He says current symptoms feel typical of his anginal equivalent.  He is a former smoker.  He smoked up to 1 pack of cigarettes per day for 20 years and quit smoking 6 years ago.  He denies any recent rhinorrhea, nasal congestion, cough, or symptoms of acute illness.  Patient denies any palpitations or pedal edema to lower extremities.  He denies any alleviating or exacerbating factors for chest pain.    History provided by:  Patient  Chest Pain  Pain location:  L chest  Pain quality: pressure and tightness    Pain radiates to:  Does not radiate  Duration:  2 weeks  Progression:  Worsening  Chronicity:  Recurrent  Context comment:  Recurrent left CP described as tightness without radiation. Pt also reports  associated SOA and nausea. Seen at ER x 3 and seen per Cards, Dr. Kelly and had chemical stress test on 2/12/25, that was abnormal. Waiting on [facility].  Relieved by:  Nothing  Worsened by:  Nothing  Ineffective treatments: Pt on Effient, ASA 81 mg daily, Crestor, and just started on Toprol XL 25 mg after abnormal stress test on 12/12/25.  Associated symptoms: anorexia, anxiety (Patient feels anxious with recurrent chest pain and recent abnormal chemical stress test), fatigue, nausea and shortness of breath    Associated symptoms: no abdominal pain, no altered mental status, no back pain, no diaphoresis, no dizziness, no fever, no headache, no heartburn, no lower extremity edema, no orthopnea, no palpitations, no PND, no syncope and no vomiting    Risk factors: coronary artery disease, high cholesterol, hypertension, male sex and obesity    Risk factors: no smoking (Former smoker. Pt smoked up to 1ppd for 20 years. Quit smoking 6 years ago.)        Review of Systems   Constitutional:  Positive for appetite change and fatigue. Negative for chills, diaphoresis and fever.   HENT: Negative.  Negative for congestion, postnasal drip, rhinorrhea, sinus pressure, sinus pain, sneezing and sore throat.    Respiratory:  Positive for shortness of breath.         Former smoker.   Cardiovascular:  Positive for chest pain. Negative for palpitations, orthopnea, leg swelling, syncope and PND.        History of CAD, first stent 12 yrs ago and second stent in 2023. Pt follows with Dr. Kelly, Cardiologist, and tells me he had recent abnormal chemical stress test on 2/12/25. Waiting on setting up left heart cath.   Gastrointestinal:  Positive for anorexia and nausea. Negative for abdominal pain, constipation, diarrhea, heartburn and vomiting.   Genitourinary: Negative.  Negative for dysuria, frequency and urgency.   Musculoskeletal: Negative.  Negative for back pain.   Neurological: Negative.  Negative for dizziness, syncope and  headaches.   All other systems reviewed and are negative.      Past Medical History:   Diagnosis Date    Ambulates with cane     s/p hip replacement    Anxiety     Arthritis     Biliary dyskinesia 02/01/2023    Congenital single kidney     Left kidney only    Coronary artery disease 09/2022    stents x 2    Coronary artery disease involving native heart with unstable angina pectoris 09/13/2022    Diabetes mellitus     Elevated cholesterol     GERD (gastroesophageal reflux disease)     Gout     Hyperlipidemia     Lung nodule     Left    Myocardial infarction 2013    Symptomatic bradycardia 10/09/2022       No Known Allergies    Past Surgical History:   Procedure Laterality Date    ABDOMINAL MASS RESECTION      CARDIAC CATHETERIZATION N/A 09/14/2022    Procedure: Left Heart Cath;  Surgeon: Bro Rodriguez MD;  Location:  [facility];  Service: Cardiology;  Laterality: N/A;    CHOLECYSTECTOMY WITH INTRAOPERATIVE CHOLANGIOGRAM N/A 2/1/2023    Procedure: CHOLECYSTECTOMY LAPAROSCOPIC INTRAOPERATIVE CHOLANGIOGRAM AND LIVER BIOPSY;  Surgeon: Stanley Gasca MD;  Location:  [facility] OR;  Service: General;  Laterality: N/A;    COLONOSCOPY      CORONARY ANGIOPLASTY WITH STENT PLACEMENT      ESOPHAGEAL MOTILITY STUDY N/A 08/16/2022    Procedure: MANOMETRY;  Surgeon: Stanley Gasca MD;  Location:  [facility];  Service: Gastroenterology;  Laterality: N/A;  LES, per screen read out at 42.6 Patient tolerated esophageal manometry well and probe was advanced to 37cm    GASTRIC PH MONITORING 24HR N/A 08/23/2022    Procedure: GASTRIC PH MONITORING 24HR;  Surgeon: Stanley Gasca MD;  Location:  [facility];  Service: General;  Laterality: N/A;  pH impedance probe dropped to 3.5cm  Les from manometry testing registered at 42.6cm    HIP SURGERY Bilateral     Bilat THR       Family History   Problem Relation Age of Onset    Hypertension Mother     Diabetes Mother     Cancer Mother     Heart disease Father      Cancer Father     Heart valve disorder Father        Social History     Socioeconomic History    Marital status: Single    Number of children: 0   Tobacco Use    Smoking status: Former     Current packs/day: 0.00     Average packs/day: 0.3 packs/day for 19.0 years (4.8 ttl pk-yrs)     Types: Cigarettes     Start date:      Quit date:      Years since quittin.1    Smokeless tobacco: Never   Vaping Use    Vaping status: Never Used   Substance and Sexual Activity    Alcohol use: Not Currently    Drug use: No    Sexual activity: Defer           Objective   Physical Exam  Vitals and nursing note reviewed.   Constitutional:       General: He is not in acute distress.     Appearance: Normal appearance. He is not ill-appearing, toxic-appearing or diaphoretic.      Comments: No acute sign of pain or distress.  Nontoxic   HENT:      Head: Normocephalic and atraumatic.      Nose: Nose normal.      Mouth/Throat:      Mouth: Mucous membranes are moist.      Pharynx: Oropharynx is clear.      Comments: Oral mucous membranes are moist  Eyes:      Extraocular Movements: Extraocular movements intact.      Conjunctiva/sclera: Conjunctivae normal.      Pupils: Pupils are equal, round, and reactive to light.   Cardiovascular:      Rate and Rhythm: Normal rate and regular rhythm. No extrasystoles are present.     Pulses: Normal pulses.      Heart sounds: Normal heart sounds. No murmur heard.     Comments: Regular rate and rhythm.  No ectopy.  No pedal edema to lower extremities  Pulmonary:      Effort: Pulmonary effort is normal. No tachypnea or accessory muscle usage.      Breath sounds: Normal breath sounds. No decreased breath sounds, wheezing or rhonchi.      Comments: Regular respiratory effort.  Lungs are clear to auscultation bilaterally  Chest:      Chest wall: No swelling, tenderness, crepitus or edema.      Comments: No chest wall tenderness to palpation  Abdominal:      General: Bowel sounds are normal. There  is no distension.      Palpations: Abdomen is soft.      Tenderness: There is no abdominal tenderness. There is no right CVA tenderness, left CVA tenderness, guarding or rebound.      Comments: Abdomen soft and nontender.  No flank or CVA tenderness   Musculoskeletal:         General: Normal range of motion.      Cervical back: Normal range of motion and neck supple.      Right lower leg: No edema.      Left lower leg: No edema.   Skin:     General: Skin is warm and dry.   Neurological:      General: No focal deficit present.      Mental Status: He is alert and oriented to person, place, and time.      Cranial Nerves: Cranial nerves 2-12 are intact.      Sensory: Sensation is intact.      Motor: Motor function is intact.      Coordination: Coordination is intact.      Comments: Alert oriented x 3.  Neuro intact and nonfocal   Psychiatric:         Mood and Affect: Mood and affect normal.         Speech: Speech normal.         Behavior: Behavior normal. Behavior is cooperative.         Thought Content: Thought content normal.         Cognition and Memory: Cognition and memory normal.         Judgment: Judgment normal.      Comments: Normal mood and affect         Procedures           ED Course  ED Course as of 02/18/25 0524   Tue Feb 18, 2025   0520 I personally interpreted EKGs x 2, which revealed sinus rhythm.  There was some T wave flattening to the lateral leads as compared to previous EKG on 2/3/2025.  No acute ST-T wave changes consistent with ischemia.  I personally interpreted chest x-ray which showed no acute cardiopulmonary process.  CBC was within normal limits.  Chemistries were essentially normal other than serum glucose of 214.  High-sensitivity troponins x 2 sets were 11.  BNP was less than 36.  Patient given full-strength aspirin.  He denies any acute chest pain.  He reports that current symptoms are typical of his anginal equivalent and he also reports he had a recent abnormal chemical stress test per  Dr. Kelly on 2/12/2025.  I reviewed epic, and Dr. Kelly does not usually have his procedure notes in epic.  Heart score is 5.  Discussed admission with Dr. Carrero, hospitalist, due to recurrent chest pain with history of CAD and current symptoms feeling consistent with anginal equivalent, as well as recent abnormal stress test.  She is agreeable to admission.  I updated patient on all results.  He is very appreciative. [FC]      ED Course User Index  [FC] Chrissy Sheriff, ANDREW                  HEART Score: 4   Shared Decision Making  I discussed the findings with the patient/patient representative who is in agreement with the treatment plan and the final disposition.  Risks and benefits of discharge and/or observation/admission were discussed: Yes                       Recent Results (from the past 24 hours)   ECG 12 Lead ED Triage Standing Order; Chest Pain    Collection Time: 02/18/25  1:53 AM   Result Value Ref Range    QT Interval 398 ms    QTC Interval 444 ms   High Sensitivity Troponin T    Collection Time: 02/18/25  2:07 AM    Specimen: Blood   Result Value Ref Range    HS Troponin T 11 <22 ng/L   Comprehensive Metabolic Panel    Collection Time: 02/18/25  2:07 AM    Specimen: Blood   Result Value Ref Range    Glucose 214 (H) 65 - 99 mg/dL    BUN 19 6 - 20 mg/dL    Creatinine 1.08 0.76 - 1.27 mg/dL    Sodium 139 136 - 145 mmol/L    Potassium 4.0 3.5 - 5.2 mmol/L    Chloride 103 98 - 107 mmol/L    CO2 22.0 22.0 - 29.0 mmol/L    Calcium 9.9 8.6 - 10.5 mg/dL    Total Protein 7.4 6.0 - 8.5 g/dL    Albumin 4.6 3.5 - 5.2 g/dL    ALT (SGPT) 35 1 - 41 U/L    AST (SGOT) 28 1 - 40 U/L    Alkaline Phosphatase 72 39 - 117 U/L    Total Bilirubin 0.5 0.0 - 1.2 mg/dL    Globulin 2.8 gm/dL    A/G Ratio 1.6 g/dL    BUN/Creatinine Ratio 17.6 7.0 - 25.0    Anion Gap 14.0 5.0 - 15.0 mmol/L    eGFR 80.0 >60.0 mL/min/1.73   Lipase    Collection Time: 02/18/25  2:07 AM    Specimen: Blood   Result Value Ref Range    Lipase 46 13  - 60 U/L   BNP    Collection Time: 02/18/25  2:07 AM    Specimen: Blood   Result Value Ref Range    proBNP <36.0 0.0 - 900.0 pg/mL   Green Top (Gel)    Collection Time: 02/18/25  2:07 AM   Result Value Ref Range    Extra Tube Hold for add-ons.    Lavender Top    Collection Time: 02/18/25  2:07 AM   Result Value Ref Range    Extra Tube hold for add-on    Gold Top - SST    Collection Time: 02/18/25  2:07 AM   Result Value Ref Range    Extra Tube Hold for add-ons.    Gray Top    Collection Time: 02/18/25  2:07 AM   Result Value Ref Range    Extra Tube Hold for add-ons.    Light Blue Top    Collection Time: 02/18/25  2:07 AM   Result Value Ref Range    Extra Tube Hold for add-ons.    CBC Auto Differential    Collection Time: 02/18/25  2:07 AM    Specimen: Blood   Result Value Ref Range    WBC 8.64 3.40 - 10.80 10*3/mm3    RBC 5.13 4.14 - 5.80 10*6/mm3    Hemoglobin 15.2 13.0 - 17.7 g/dL    Hematocrit 45.5 37.5 - 51.0 %    MCV 88.7 79.0 - 97.0 fL    MCH 29.6 26.6 - 33.0 pg    MCHC 33.4 31.5 - 35.7 g/dL    RDW 13.5 12.3 - 15.4 %    RDW-SD 43.7 37.0 - 54.0 fl    MPV 10.6 6.0 - 12.0 fL    Platelets 222 140 - 450 10*3/mm3    Neutrophil % 54.4 42.7 - 76.0 %    Lymphocyte % 39.0 19.6 - 45.3 %    Monocyte % 4.3 (L) 5.0 - 12.0 %    Eosinophil % 1.7 0.3 - 6.2 %    Basophil % 0.3 0.0 - 1.5 %    Immature Grans % 0.3 0.0 - 0.5 %    Neutrophils, Absolute 4.69 1.70 - 7.00 10*3/mm3    Lymphocytes, Absolute 3.37 (H) 0.70 - 3.10 10*3/mm3    Monocytes, Absolute 0.37 0.10 - 0.90 10*3/mm3    Eosinophils, Absolute 0.15 0.00 - 0.40 10*3/mm3    Basophils, Absolute 0.03 0.00 - 0.20 10*3/mm3    Immature Grans, Absolute 0.03 0.00 - 0.05 10*3/mm3    nRBC 0.0 0.0 - 0.2 /100 WBC   ECG 12 Lead ED Triage Standing Order; Chest Pain    Collection Time: 02/18/25  4:24 AM   Result Value Ref Range    QT Interval 402 ms    QTC Interval 430 ms   High Sensitivity Troponin T 1Hr    Collection Time: 02/18/25  4:29 AM    Specimen: Blood   Result Value Ref Range  "   HS Troponin T 11 <22 ng/L    Troponin T Numeric Delta 0 Abnormal if >/=3 ng/L     Note: In addition to lab results from this visit, the labs listed above may include labs taken at another facility or during a different encounter within the last 24 hours. Please correlate lab times with ED admission and discharge times for further clarification of the services performed during this visit.    XR Chest 1 View   Final Result   Impression:   No active disease.               Electronically Signed: Chadwick Steele MD     2/18/2025 3:12 AM EST     Workstation ID: [id number]        Vitals:    02/18/25 0147   BP: 132/91   BP Location: Left arm   Patient Position: Sitting   Pulse: 87   Resp: 18   Temp: 98.1 °F (36.7 °C)   TempSrc: Oral   SpO2: 95%   Weight: 95.3 kg (210 lb)   Height: 167.6 cm (66\")     Medications   sodium chloride 0.9 % flush 10 mL (has no administration in time range)   aspirin chewable tablet 324 mg (324 mg Oral Given 2/18/25 0206)     ECG/EMG Results (last 24 hours)       Procedure Component Value Units Date/Time    ECG 12 Lead ED Triage Standing Order; Chest Pain [[id number]] Collected: 02/18/25 0153     Updated: 02/18/25 0153     QT Interval 398 ms      QTC Interval 444 ms     Narrative:      Test Reason : ED Triage Standing Order~  Blood Pressure :   */*   mmHG  Vent. Rate :  75 BPM     Atrial Rate :  75 BPM     P-R Int : 168 ms          QRS Dur :  86 ms      QT Int : 398 ms       P-R-T Axes :  42  -5  76 degrees    QTcB Int : 444 ms    Normal sinus rhythm  Possible Anterior infarct , age undetermined  Abnormal ECG  When compared with ECG of 03-Feb-2025 10:41,  Nonspecific T wave abnormality now evident in Anterior leads    Referred By: EDMD           Confirmed By:           ECG 12 Lead ED Triage Standing Order; Chest Pain   Preliminary Result   Test Reason : ED Triage Standing Order~   Blood Pressure :   */*   mmHG   Vent. Rate :  69 BPM     Atrial Rate :  69 BPM      P-R Int : 170 ms          QRS Dur :  " 86 ms       QT Int : 402 ms       P-R-T Axes :  35  -6  70 degrees     QTcB Int : 430 ms      Normal sinus rhythm   Cannot rule out Anterior infarct (cited on or before 18-Feb-2025)   Abnormal ECG   When compared with ECG of 18-Feb-2025 01:53, (Unconfirmed)   No significant change was found      Referred By:            Confirmed By:       ECG 12 Lead ED Triage Standing Order; Chest Pain   Preliminary Result   Test Reason : ED Triage Standing Order~   Blood Pressure :   */*   mmHG   Vent. Rate :  75 BPM     Atrial Rate :  75 BPM      P-R Int : 168 ms          QRS Dur :  86 ms       QT Int : 398 ms       P-R-T Axes :  42  -5  76 degrees     QTcB Int : 444 ms      Normal sinus rhythm   Possible Anterior infarct , age undetermined   Abnormal ECG   When compared with ECG of 03-Feb-2025 10:41,   Nonspecific T wave abnormality now evident in Anterior leads      Referred By: EDMD           Confirmed By:                  HEART Score for Major Cardiac Events - MDCalc  Calculated on Feb 18 2025 4:24 AM  5 points -> Moderate Score (4-6 points) Risk of MACE of 12-16.6%.          Medical Decision Making  Amount and/or Complexity of Data Reviewed  Labs: ordered.  Radiology: ordered.  ECG/medicine tests: ordered.    Risk  OTC drugs.  Prescription drug management.        Final diagnoses:   Recurrent chest pain   Anginal equivalent   Abnormal stress test   Shortness of breath   Nausea   History of coronary artery disease   History of hypertension   History of hyperlipidemia   Type 2 diabetes mellitus with hyperglycemia, without long-term current use of insulin   Former smoker       ED Disposition  ED Disposition       ED Disposition   Decision to Admit    Condition   --    Comment   --               No follow-up provider specified.       Medication List        Stop      metoprolol succinate XL 25 MG 24 hr tablet  Commonly known as: TOPROL-XL                 Chrissy Sheriff PA-C  02/18/25 0524

## 2025-02-18 NOTE — CONSULTS
"          Cardiology Consult - [facility]    Geovanny Barnett     12/12  1970  [address] [facility] [address]         Admission Date:  2/18/2025    Consultation Date:  02/18/25        PCP:  Champ Feng MD  Referring MD:  Dr. Gonzalez - Hospitalist  Consulting MD:  Dr. Kam  Primary Cardiologist:  Dr. Kelly        CC: Chest Pain     Reason for Consult: Chest pain with known CAD      Chest pain    GERD (gastroesophageal reflux disease)    Hyperlipemia    Essential hypertension    Solitary kidney, congenital    Anxiety    CAD (coronary artery disease)    T2DM (type 2 diabetes mellitus)    Acute chest pain      Allergies:  has No Known Allergies.    (Not in a hospital admission)      sodium chloride, 75 mL/hr, Last Rate: 75 mL/hr (02/18/25 0744)            CARDIAC RISK FACTORS:   advanced age (older than 55 for men, 65 for women), diabetes mellitus, dyslipidemia, family history of premature cardiovascular disease, hypertension, male gender, and obesity (BMI >= 30 kg/m2).         HPI:  Geovanny Barnett is a 55-year-old CM with past medical history CAD, HTN, HLP, DM2, obesity, congenital solitary kidney, mild anxiety.  He was recently seen in our office for an episode of chest pain similar to what he experienced prior to previous stenting.  He underwent nuclear stress testing that was read as \"normal\" but secondary to continued reported chest pain, he was being referred for cardiac catheterization.  He has been in and out of our ED x 2 and another local ED x 1 in the last week with continued chest pain, ruling out for MI.  Today presents to [facility] ED after waking up with chest pain, nausea and emesis.  Serial troponins are negative.  EKG shows no acute changes.  Vital stable.          Social History     Socioeconomic History    Marital status: Single    Number of children: 0   Tobacco Use    Smoking status: Former     Current packs/day: 0.00     Average packs/day: 0.3 packs/day for " "19.0 years (4.8 ttl pk-yrs)     Types: Cigarettes     Start date:      Quit date:      Years since quittin.1    Smokeless tobacco: Never   Vaping Use    Vaping status: Never Used   Substance and Sexual Activity    Alcohol use: Not Currently    Drug use: No    Sexual activity: Defer     Family History   Problem Relation Age of Onset    Hypertension Mother     Diabetes Mother     Cancer Mother     Heart disease Father     Cancer Father     Heart valve disorder Father      Past Surgical History:   Procedure Laterality Date    ABDOMINAL MASS RESECTION      CARDIAC CATHETERIZATION N/A 2022    Procedure: Left Heart Cath;  Surgeon: Bro Rodriguez MD;  Location:  [facility] CATH INVASIVE LOCATION;  Service: Cardiology;  Laterality: N/A;    CHOLECYSTECTOMY WITH INTRAOPERATIVE CHOLANGIOGRAM N/A 2023    Procedure: CHOLECYSTECTOMY LAPAROSCOPIC INTRAOPERATIVE CHOLANGIOGRAM AND LIVER BIOPSY;  Surgeon: Stanley Gasca MD;  Location:  [facility] OR;  Service: General;  Laterality: N/A;    COLONOSCOPY      CORONARY ANGIOPLASTY WITH STENT PLACEMENT      ESOPHAGEAL MOTILITY STUDY N/A 2022    Procedure: MANOMETRY;  Surgeon: Stanley Gasca MD;  Location:  [facility]OS[facility];  Service: Gastroenterology;  Laterality: N/A;  LES, per screen read out at 42.6 Patient tolerated esophageal manometry well and probe was advanced to 37cm    GASTRIC PH MONITORING 24HR N/A 2022    Procedure: GASTRIC PH MONITORING 24HR;  Surgeon: Stanley Gasca MD;  Location:  [facility];  Service: General;  Laterality: N/A;  pH impedance probe dropped to 3.5cm  Les from manometry testing registered at 42.6cm    HIP SURGERY Bilateral     Bilat THR     ROS: Pertinent items are noted in HPI, all other systems reviewed and negative     Objective     height is 167.6 cm (66\") and weight is 95.3 kg (210 lb). His oral temperature is 98.1 °F (36.7 °C). His blood pressure is 105/77 and his pulse is 58. His respiration is 18 and oxygen " saturation is 92%.  No intake or output data in the 24 hours ending 02/18/25 0834         02/18/25  0147   Weight: 95.3 kg (210 lb)          Physical Exam:  General Appearance:    Alert, cooperative, in no acute distress   Head:    Normocephalic, without obvious abnormality, atraumatic   Eyes:            Lids and lashes normal, conjunctivae and sclerae normal, no   icterus, no pallor, corneas clear, PERRLA   Ears:    Ears appear intact with no abnormalities noted   Throat:   No oral lesions, no thrush, oral mucosa moist   Neck:   No adenopathy, supple, trachea midline, no thyromegaly, no carotid bruit, no JVD   Back:     No kyphosis present, no scoliosis present, no skin lesions,    erythema or scars, no tenderness to percussion or                   palpation, range of motion normal   Lungs:     Clear to auscultation,respirations regular, even and               unlabored    Heart:    Regular rhythm and normal rate, normal S1 and S2, no         murmur, no gallop, no rub, no click   Abdomen:     Normal bowel sounds, no masses, no organomegaly, soft     nontender, nondistended, no guarding, no rebound   tenderness   Extremities:   Moves all extremities well,  no cyanosis, no redness, no edema   Pulses:   Pulses palpable and equal bilaterally   Skin:   No bleeding, bruising or rash   Lymph nodes:   No palpable adenopathy   Neurologic:   Cranial nerves 2 - 12 grossly intact, sensation intact, DTR     present and equal bilaterally          Results Review:  I personally reviewed the patient's clinical results.  Results from last 7 days   Lab Units 02/18/25  0207   WBC 10*3/mm3 8.64   HEMOGLOBIN g/dL 15.2   HEMATOCRIT % 45.5   PLATELETS 10*3/mm3 222     Results from last 7 days   Lab Units 02/18/25  0207   SODIUM mmol/L 139   POTASSIUM mmol/L 4.0   CHLORIDE mmol/L 103   CO2 mmol/L 22.0   BUN mg/dL 19   CREATININE mg/dL 1.08   CALCIUM mg/dL 9.9   BILIRUBIN mg/dL 0.5   ALK PHOS U/L 72   ALT (SGPT) U/L 35   AST (SGOT) U/L 28    GLUCOSE mg/dL 214*     Results from last 7 days   Lab Units 02/18/25  0207   SODIUM mmol/L 139   POTASSIUM mmol/L 4.0   CHLORIDE mmol/L 103   CO2 mmol/L 22.0   BUN mg/dL 19   CREATININE mg/dL 1.08   GLUCOSE mg/dL 214*   CALCIUM mg/dL 9.9     Results from last 7 days   Lab Units 02/18/25  0646   INR  1.02     Results from last 7 days   Lab Units 02/18/25  0646   TSH uIU/mL 3.900     Results from last 7 days   Lab Units 02/18/25  0646   CHOLESTEROL mg/dL 80   TRIGLYCERIDES mg/dL 224*   HDL CHOL mg/dL 32*   LDL CHOL mg/dL 14     Results from last 7 days   Lab Units 02/18/25  0207   PROBNP pg/mL <36.0             Radiology:  Imaging Results (Last 72 Hours)       Procedure Component Value Units Date/Time    XR Chest 1 View [[id number]] Collected: 02/18/25 0311     Updated: 02/18/25 0315    Narrative:      XR CHEST 1 VW    Date of Exam: 2/18/2025 3:04 AM EST    Indication: Chest Pain Triage Protocol    Comparison: Chest radiograph 2/3/2025    Findings:  There are no airspace consolidations. No pleural fluid. No pneumothorax. The pulmonary vasculature appears within normal limits. The cardiac and mediastinal silhouette appear unremarkable. No acute osseous abnormality identified.      Impression:      Impression:  No active disease.          Electronically Signed: Chadwick Steele MD    2/18/2025 3:12 AM EST    Workstation ID: [id number]              Tele: NSR    Assessment:  -55-year-old CM with known CAD presents to [facility] ED with recurrent chest pain, third visit local ED's in the last week with recurrent discomfort.  -CAD/PTCA and stenting of the LAD x 2  -HTN  -HLP lipid panel:TC 80, HDL 32, LDL 14,   -DM 2  -Anxiety  -Obesity      Plan:  -Admitted to hospitalist services  -Patient seen evaluated in ED.  Reviewed stress test results with the patient, read as normal but secondary to persistent symptoms, will proceed with cardiac catheterization today.  NPO.  Risk and benefits have been reviewed with the patient he is  willing to proceed.  Further recommendations based on outcomes.  -Continue ASA, Effient, Crestor.        I discussed the patient's findings and my recommendations with the patient, any present family members, and the nursing staff.  Priyank Kam MD saw and examined patient, verified hx and PE, read all radiographic studies, reviewed labs and micro data, and formulated dx, plan for treatment and all medical decision making.      Ayesha Mohr PA-C, working with Priyank Kam MD  02/18/25 08:34 EST

## 2025-02-18 NOTE — CASE MANAGEMENT/SOCIAL WORK
Discharge Planning Assessment  [facility]     Patient Name: Geovanny Barnett  MRN: [id number]  Today's Date: 2/18/2025    Admit Date: 2/18/2025    Plan: Home   Discharge Needs Assessment       Row Name 02/18/25 1445       Living Environment    People in Home alone    Current Living Arrangements home    Potentially Unsafe Housing Conditions none    In the past 12 months has the electric, gas, oil, or water company threatened to shut off services in your home? No    Primary Care Provided by self    Provides Primary Care For no one    Family Caregiver if Needed significant other    Family Caregiver Names PREET BAILEY Significant Other   [phone]    Quality of Family Relationships helpful;involved;supportive    Able to Return to Prior Arrangements yes       Resource/Environmental Concerns    Resource/Environmental Concerns none    Transportation Concerns none       Transportation Needs    In the past 12 months, has lack of transportation kept you from medical appointments or from getting medications? no    In the past 12 months, has lack of transportation kept you from meetings, work, or from getting things needed for daily living? No       Food Insecurity    Within the past 12 months, you worried that your food would run out before you got the money to buy more. Never true    Within the past 12 months, the food you bought just didn't last and you didn't have money to get more. Never true       Transition Planning    Patient/Family Anticipates Transition to home with family    Patient/Family Anticipated Services at Transition none    Transportation Anticipated family or friend will provide       Discharge Needs Assessment    Readmission Within the Last 30 Days no previous admission in last 30 days    Equipment Currently Used at Home crutches, forarm    Concerns to be Addressed discharge planning    Do you want help finding or keeping work or a job? I do not need or want help    Do you want help with school or  training? For example, starting or completing job training or getting a high school diploma, GED or equivalent No    Anticipated Changes Related to Illness none    Equipment Needed After Discharge none                   Discharge Plan       Row Name 02/18/25 1446       Plan    Plan Home    Patient/Family in Agreement with Plan yes    Plan Comments CM spoke with patient at bedside regarding DC planning. Patient resides in [facility], alone. Patient is independent with ADL's, denies any DME. Patient denies any current home health or outpatient services. Patient has medical insurance, prescription coverage and is able to afford/obtain medications without difficulty. Patient has no advanced directives. Patient denies any discharge planning needs at this time. Goal is home. CM will continue to follow    Final Discharge Disposition Code 01 - home or self-care                  Continued Care and Services - Admitted Since 2/18/2025    No active coordination exists for this encounter.       Expected Discharge Date and Time       Expected Discharge Date Expected Discharge Time    Feb 20, 2025            Demographic Summary       Row Name 02/18/25 1444       General Information    Arrived From home    Referral Source emergency department    Reason for Consult discharge planning    Preferred Language English       Contact Information    Contact Information Comments PREET BAILEY Significant Other   [phone]                   Functional Status       Row Name 02/18/25 1444       Functional Status    Usual Activity Tolerance good    Current Activity Tolerance good       Physical Activity    On average, how many days per week do you engage in moderate to strenuous exercise (like a brisk walk)? 1 day    On average, how many minutes do you engage in exercise at this level? 10 min    Number of minutes of exercise per week 10       Assessment of Health Literacy    How often do you have someone help you read hospital materials?  Never    How often do you have problems learning about your medical condition because of difficulty understanding written information? Never    How often do you have a problem understanding what is told to you about your medical condition? Never    How confident are you filling out medical forms by yourself? Quite a bit    Health Literacy Good       Functional Status, IADL    Medications independent    Meal Preparation independent    Housekeeping independent    Laundry independent    Shopping independent    If for any reason you need help with day-to-day activities such as bathing, preparing meals, shopping, managing finances, etc., do you get the help you need? I don't need any help       Mental Status    General Appearance WDL WDL       Mental Status Summary    Recent Changes in Mental Status/Cognitive Functioning no changes       Employment/    Employment Status disabled                   Psychosocial    No documentation.                  Abuse/Neglect    No documentation.                  Legal    No documentation.                  Substance Abuse    No documentation.                  Patient Forms    No documentation.                     Bria Nettlse RN

## 2025-02-18 NOTE — H&P
"    [facility] Medicine Services  HISTORY AND PHYSICAL    Patient Name: Geovanny Barnett  : 1970  MRN: [id number]  Primary Care Physician: Champ Feng MD  Date of admission: (Not on file)    Subjective   Subjective     Chief Complaint:  Chest pain     HPI:  Geovanny Barnett is a 55 y.o. male w/ a hx of CAD (s/p stent x2), HTN, HLD, GERD, T2DM, congential solitary kidney, anxiety who presented to the ED w/ c/o chest pain.   Pt c/o intermittent chest pain x 2-3 weeks. Pain occurs at rest and w/ exertion. Pain usually located left chest w/ no radiation. Pt describes that he intermittently has associated nausea, diaphoresis and dyspnea but not always. Tonight pt was asleep and woke w/ nausea and chest pain/pressure prompting his ED visit. Patient reports that his symptoms feel typical of his anginal equivalent.   Pt evaluated in the ED on 25 w/ chest pain. Troponin WNL, EKG stable. Pt instructed to f/u w/ Dr. Feng w/ Cardiology. Pt d/c home.   Pt evaluated in the ED at [facility] on 25 w/ chest pain. Troponin negative x2. Pt instructed to f/u w/ Cardiology and was d/c home.   Pt reports that he had a chemical stress test on  at [facility], Dr. Kelly. Pt reports that the stress was \"abnormal\". Pt is awaiting f/u and further instructions from Cardiology.   Pt denies fever/chills, cough, V/D, abdominal pain, edema, syncope.   Pt evaluated in the ED. CXR unremarkable. EKG stable. Initial troponin WNL. Pt admitted to the hospital medicine service for further evaluation.     Review of Systems   Constitutional:  Positive for diaphoresis. Negative for chills and fever.   HENT: Negative.  Negative for congestion, postnasal drip, rhinorrhea, sinus pressure, sinus pain and trouble swallowing.    Eyes: Negative.  Negative for visual disturbance.   Respiratory:  Positive for chest tightness and shortness of breath. Negative for cough.    Cardiovascular:  Positive for chest pain. Negative " for palpitations and leg swelling.   Gastrointestinal:  Positive for nausea. Negative for abdominal pain, constipation, diarrhea and vomiting.   Endocrine: Negative.    Genitourinary: Negative.  Negative for decreased urine volume, difficulty urinating and dysuria.   Musculoskeletal: Negative.  Negative for arthralgias and myalgias.   Skin: Negative.  Negative for wound.   Allergic/Immunologic: Negative.  Negative for immunocompromised state.   Neurological: Negative.  Negative for dizziness, syncope, weakness, light-headedness and headaches.   Hematological: Negative.  Does not bruise/bleed easily.   Psychiatric/Behavioral: Negative.  Negative for confusion.    All other systems reviewed and are negative.     Personal History     Past Medical History:   Diagnosis Date    Ambulates with cane     s/p hip replacement    Anxiety     Arthritis     Biliary dyskinesia 02/01/2023    Congenital single kidney     Left kidney only    Coronary artery disease 09/2022    stents x 2    Coronary artery disease involving native heart with unstable angina pectoris 09/13/2022    Diabetes mellitus     Elevated cholesterol     GERD (gastroesophageal reflux disease)     Gout     Hyperlipidemia     Lung nodule     Left    Myocardial infarction 2013    Symptomatic bradycardia 10/09/2022     Past Surgical History:   Procedure Laterality Date    ABDOMINAL MASS RESECTION      CARDIAC CATHETERIZATION N/A 09/14/2022    Procedure: Left Heart Cath;  Surgeon: Bro Rodriguez MD;  Location: [facility] CATH INVASIVE LOCATION;  Service: Cardiology;  Laterality: N/A;    CHOLECYSTECTOMY WITH INTRAOPERATIVE CHOLANGIOGRAM N/A 2/1/2023    Procedure: CHOLECYSTECTOMY LAPAROSCOPIC INTRAOPERATIVE CHOLANGIOGRAM AND LIVER BIOPSY;  Surgeon: Stanley Gasca MD;  Location: [facility] OR;  Service: General;  Laterality: N/A;    COLONOSCOPY      CORONARY ANGIOPLASTY WITH STENT PLACEMENT      ESOPHAGEAL MOTILITY STUDY N/A 08/16/2022    Procedure: MANOMETRY;  Surgeon:  Stanley Gasca MD;  Location:  [facility];  Service: Gastroenterology;  Laterality: N/A;  LES, per screen read out at 42.6 Patient tolerated esophageal manometry well and probe was advanced to 37cm    GASTRIC PH MONITORING 24HR N/A 08/23/2022    Procedure: GASTRIC PH MONITORING 24HR;  Surgeon: Stanley Gasca MD;  Location:  [facility];  Service: General;  Laterality: N/A;  pH impedance probe dropped to 3.5cm  Les from manometry testing registered at 42.6cm    HIP SURGERY Bilateral     Bilat THR     Family History: family history includes Cancer in his father and mother; Diabetes in his mother; Heart disease in his father; Heart valve disorder in his father; Hypertension in his mother.     Social History:  reports that he quit smoking about 9 years ago. His smoking use included cigarettes. He started smoking about 28 years ago. He has a 4.8 pack-year smoking history. He has never used smokeless tobacco. He reports that he does not currently use alcohol. He reports that he does not use drugs.  Social History     Social History Narrative    Lives in [facility].      Medications:  Allopurinol, aspirin, docusate sodium, ezetimibe, famotidine, hydrOXYzine, metFORMIN, metoprolol succinate XL, nitroglycerin, omeprazole, ondansetron, oxyCODONE-acetaminophen, prasugrel, rosuvastatin, sildenafil, and sucralfate    No Known Allergies    Objective   Objective     Vital Signs:   Temp:  [98.1 °F (36.7 °C)] 98.1 °F (36.7 °C)  Heart Rate:  [87] 87  Resp:  [18] 18  BP: (132)/(91) 132/91    Physical Exam     Constitutional: Awake, alert; non-toxic appearing   Eyes: PERRLA, sclerae anicteric, no conjunctival injection  HENT: NCAT, mucous membranes moist  Neck: Supple, no thyromegaly, no lymphadenopathy, trachea midline  Respiratory: Clear to auscultation bilaterally, nonlabored respirations   Cardiovascular: RRR, no murmurs, rubs, or gallops, no peripheral edema   Gastrointestinal: Positive bowel sounds, soft,  nontender, nondistended  Musculoskeletal: Normal ROM bilaterally   Psychiatric: Appropriate affect, cooperative  Neurologic: Oriented x 3, strength symmetric in all extremities, speech clear  Skin: No rashes, lesions or wounds     Result Review:  I have personally reviewed the results from the time of this admission to 2/18/2025 05:27 EST and agree with these findings:  [x]  Laboratory list / accordion  []  Microbiology  [x]  Radiology  [x]  EKG/Telemetry   []  Cardiology/Vascular   []  Pathology  [x]  Old records    LAB RESULTS:      Lab 02/18/25  0207   WBC 8.64   HEMOGLOBIN 15.2   HEMATOCRIT 45.5   PLATELETS 222   NEUTROS ABS 4.69   IMMATURE GRANS (ABS) 0.03   LYMPHS ABS 3.37*   MONOS ABS 0.37   EOS ABS 0.15   MCV 88.7         Lab 02/18/25  0207   SODIUM 139   POTASSIUM 4.0   CHLORIDE 103   CO2 22.0   ANION GAP 14.0   BUN 19   CREATININE 1.08   EGFR 80.0   GLUCOSE 214*   CALCIUM 9.9         Lab 02/18/25  0207   TOTAL PROTEIN 7.4   ALBUMIN 4.6   GLOBULIN 2.8   ALT (SGPT) 35   AST (SGOT) 28   BILIRUBIN 0.5   ALK PHOS 72   LIPASE 46         Lab 02/18/25  0429 02/18/25  0207   PROBNP  --  <36.0   HSTROP T 11 11                 Brief Urine Lab Results       None          Microbiology Results (last 10 days)       ** No results found for the last 240 hours. **          XR Chest 1 View    Result Date: 2/18/2025  XR CHEST 1 VW Date of Exam: 2/18/2025 3:04 AM EST Indication: Chest Pain Triage Protocol Comparison: Chest radiograph 2/3/2025 Findings: There are no airspace consolidations. No pleural fluid. No pneumothorax. The pulmonary vasculature appears within normal limits. The cardiac and mediastinal silhouette appear unremarkable. No acute osseous abnormality identified.     Impression: Impression: No active disease. Electronically Signed: Chadwick Steele MD  2/18/2025 3:12 AM EST  Workstation ID: [id number]     Results for orders placed during the hospital encounter of 09/13/22    Adult Transthoracic Echo Complete W/ Cont  if Necessary Per Protocol    Interpretation Summary  · Left ventricular ejection fraction appears to be 51 - 55%.  · Estimated right ventricular systolic pressure from tricuspid regurgitation is normal (<35 mmHg). Calculated right ventricular systolic pressure from tricuspid regurgitation is 20 mmHg.    Assessment & Plan   Assessment & Plan       Chest pain    GERD (gastroesophageal reflux disease)    Hyperlipemia    Essential hypertension    Solitary kidney, congenital    Anxiety    CAD (coronary artery disease)    T2DM (type 2 diabetes mellitus)    Geovanny Barnett is a 55 y.o. male w/ a hx of CAD (s/p stent x2), HTN, HLD, GERD, T2DM, congential solitary kidney, anxiety who presented to the ED w/ c/o chest pain.     **Chest pain   **CAD (s/p stent x2)  **HTN, HLD  -pt c/o intermittent chest pain x ~ 2-3 weeks  -pt follows w/ Dr. Feng   -s/p stress test on 2/12/25 (w/ Dr. Kelly), pt reports results were abnormal   -last cardiac cath in 2022 (w/ intervention)  -ECHO 9/2022: EF 51-55%  -CXR unremarkable   -troponin 11, 11  -proBNP <36.0  -EKG stable; repeat this morning   -s/p ASA 324mg given in ED; continue 81mg daily   -continue statin (Zetia held)   -Effient continued   -tsh, FLP, hem A1c, coags- ordered/pending   -NPO pending Cardiology recommendations this morning   -NS @ 75ml/hour x 12 hours   -symptom mgt  -Cardiology consult this morning     **T2DM  -hem A1c pending   -holding routine Metformin   -FSBG q 6 hours w/ LDSS (q 6 hrs while NPO)     **GERD  -continue PPI    **Anxiety   -continue PRN atarax     **Solitary kidney, congenital     DVT prophylaxis:  Mechanical     CODE STATUS:    Code Status (Patient has no pulse and is not breathing): CPR (Attempt to Resuscitate)  Medical Interventions (Patient has pulse or is breathing): Full Support    Expected Discharge  Expected Discharge Date: 2/20/2025; Expected Discharge Time:     Signature: Electronically signed by IRVIN Soriano, 02/18/25, 5:27 AM  EST.    Time spent: 55 minutes

## 2025-02-18 NOTE — ED NOTES
Geovanny Barnett    Nursing Report ED to Floor:  Mental status: a&ox4  Ambulatory status: up ad abdiel  Oxygen Therapy:  room air   Cardiac Rhythm: NSR  Admitted from: home /ed  Safety Concerns:  n/a  Precautions: n/a  Social Issues: n/a  ED Room #:  12    ED Nurse Phone Extension - [phone] or may call [phone].      HPI:   Chief Complaint   Patient presents with    Chest Pain       Past Medical History:  Past Medical History:   Diagnosis Date    Ambulates with cane     s/p hip replacement    Anxiety     Arthritis     Biliary dyskinesia 02/01/2023    Congenital single kidney     Left kidney only    Coronary artery disease 09/2022    stents x 2    Coronary artery disease involving native heart with unstable angina pectoris 09/13/2022    Diabetes mellitus     Elevated cholesterol     GERD (gastroesophageal reflux disease)     Gout     Hyperlipidemia     Lung nodule     Left    Myocardial infarction 2013    Symptomatic bradycardia 10/09/2022        Past Surgical History:  Past Surgical History:   Procedure Laterality Date    ABDOMINAL MASS RESECTION      CARDIAC CATHETERIZATION N/A 09/14/2022    Procedure: Left Heart Cath;  Surgeon: Bro Rodriguez MD;  Location: [facility] CATH INVASIVE LOCATION;  Service: Cardiology;  Laterality: N/A;    CHOLECYSTECTOMY WITH INTRAOPERATIVE CHOLANGIOGRAM N/A 2/1/2023    Procedure: CHOLECYSTECTOMY LAPAROSCOPIC INTRAOPERATIVE CHOLANGIOGRAM AND LIVER BIOPSY;  Surgeon: Stanley Gasca MD;  Location: [facility] OR;  Service: General;  Laterality: N/A;    COLONOSCOPY      CORONARY ANGIOPLASTY WITH STENT PLACEMENT      ESOPHAGEAL MOTILITY STUDY N/A 08/16/2022    Procedure: MANOMETRY;  Surgeon: Stanley Gasca MD;  Location: [facility] ENDOSCOPY;  Service: Gastroenterology;  Laterality: N/A;  LES, per screen read out at 42.6 Patient tolerated esophageal manometry well and probe was advanced to 37cm    GASTRIC PH MONITORING 24HR N/A 08/23/2022    Procedure: GASTRIC PH MONITORING 24HR;  Surgeon: Stanley Gasca  "MD ALANA;  Location: [facility] ENDOSCOPY;  Service: General;  Laterality: N/A;  pH impedance probe dropped to 3.5cm  Les from manometry testing registered at 42.6cm    HIP SURGERY Bilateral     Bilat THR        Admitting Doctor:   Richard Gonzalez DO    Consulting Provider(s):  Consults       Date and Time Order Name Status Description    2/18/2025  6:28 AM Inpatient Cardiology Consult               Admitting Diagnosis:   The primary encounter diagnosis was Recurrent chest pain. Diagnoses of Anginal equivalent, Abnormal stress test, Shortness of breath, Nausea, History of coronary artery disease, History of hypertension, History of hyperlipidemia, Type 2 diabetes mellitus with hyperglycemia, without long-term current use of insulin, and Former smoker were also pertinent to this visit.    Most Recent Vitals:   Vitals:    02/18/25 0147 02/18/25 0600 02/18/25 0630 02/18/25 0700   BP: 132/91 116/74 115/75 109/95   BP Location: Left arm      Patient Position: Sitting      Pulse: 87 72 59 62   Resp: 18      Temp: 98.1 °F (36.7 °C)      TempSrc: Oral      SpO2: 95% 92% 93% 93%   Weight: 95.3 kg (210 lb)      Height: 167.6 cm (66\")          Active LDAs/IV Access:   Lines, Drains & Airways       Active LDAs       Name Placement date Placement time Site Days    Peripheral IV 02/18/25 0705 Left Antecubital 02/18/25  0705  Antecubital  less than 1                    Labs (abnormal labs have a star):   Labs Reviewed   COMPREHENSIVE METABOLIC PANEL - Abnormal; Notable for the following components:       Result Value    Glucose 214 (*)     All other components within normal limits    Narrative:     GFR Categories in Chronic Kidney Disease (CKD)      GFR Category          GFR (mL/min/1.73)    Interpretation  G1                     90 or greater         Normal or high (1)  G2                      60-89                Mild decrease (1)  G3a                   45-59                Mild to moderate decrease  G3b                   30-44             "    Moderate to severe decrease  G4                    15-29                Severe decrease  G5                    14 or less           Kidney failure          (1)In the absence of evidence of kidney disease, neither GFR category G1 or G2 fulfill the criteria for CKD.    eGFR calculation 2021 CKD-EPI creatinine equation, which does not include race as a factor   CBC WITH AUTO DIFFERENTIAL - Abnormal; Notable for the following components:    Monocyte % 4.3 (*)     Lymphocytes, Absolute 3.37 (*)     All other components within normal limits   LIPID PANEL - Abnormal; Notable for the following components:    Triglycerides 224 (*)     HDL Cholesterol 32 (*)     All other components within normal limits    Narrative:     Cholesterol Reference Ranges  (U.S. Department of Health and Human Services ATP III Classifications)    Desirable          <200 mg/dL  Borderline High    200-239 mg/dL  High Risk          >240 mg/dL      Triglyceride Reference Ranges  (U.S. Department of Health and Human Services ATP III Classifications)    Normal           <150 mg/dL  Borderline High  150-199 mg/dL  High             200-499 mg/dL  Very High        >500 mg/dL    HDL Reference Ranges  (U.S. Department of Health and Human Services ATP III Classifications)    Low     <40 mg/dl (major risk factor for CHD)  High    >60 mg/dl ('negative' risk factor for CHD)        LDL Reference Ranges  (U.S. Department of Health and Human Services ATP III Classifications)    Optimal          <100 mg/dL  Near Optimal     100-129 mg/dL  Borderline High  130-159 mg/dL  High             160-189 mg/dL  Very High        >189 mg/dL    LDL is calculated using the NIH LDL-C calculation.     TROPONIN - Normal    Narrative:     High Sensitive Troponin T Reference Range:  <14.0 ng/L- Negative Female for AMI  <22.0 ng/L- Negative Male for AMI  >=14 - Abnormal Female indicating possible myocardial injury.  >=22 - Abnormal Male indicating possible myocardial injury.    Clinicians would have to utilize clinical acumen, EKG, Troponin, and serial changes to determine if it is an Acute Myocardial Infarction or myocardial injury due to an underlying chronic condition.        LIPASE - Normal   BNP (IN-HOUSE) - Normal    Narrative:     This assay is used as an aid in the diagnosis of individuals suspected of having heart failure. It can be used as an aid in the diagnosis of acute decompensated heart failure (ADHF) in patients presenting with signs and symptoms of ADHF to the emergency department (ED). In addition, NT-proBNP of <300 pg/mL indicates ADHF is not likely.    Age Range Result Interpretation  NT-proBNP Concentration (pg/mL:      <50             Positive            >450                   Gray                 300-450                    Negative             <300    50-75           Positive            >900                  Gray                300-900                  Negative            <300      >75             Positive            >1800                  Gray                300-1800                  Negative            <300   HIGH SENSITIVITIY TROPONIN T 1HR - Normal    Narrative:     High Sensitive Troponin T Reference Range:  <14.0 ng/L- Negative Female for AMI  <22.0 ng/L- Negative Male for AMI  >=14 - Abnormal Female indicating possible myocardial injury.  >=22 - Abnormal Male indicating possible myocardial injury.   Clinicians would have to utilize clinical acumen, EKG, Troponin, and serial changes to determine if it is an Acute Myocardial Infarction or myocardial injury due to an underlying chronic condition.        MAGNESIUM - Normal   PROTIME-INR - Normal   APTT - Normal    Narrative:     PTT = The equivalent PTT values for the therapeutic range of heparin levels at 0.3 to 0.5 U/ml are 60 to 70 seconds.   POCT GLUCOSE FINGERSTICK - Normal   RAINBOW DRAW    Narrative:     The following orders were created for panel order [place] Draw.  Procedure                                Abnormality         Status                     ---------                               -----------         ------                     Green Top (Gel)[[id number]]                                  Final result               Lavender Top[[id number]]                                     Final result               Gold Top - SST[[id number]]                                   Final result               Ring Top[[id number]]                                         Final result               Light Blue Top[[id number]]                                   Final result                 Please view results for these tests on the individual orders.   TSH   HEMOGLOBIN A1C   TROPONIN   POCT GLUCOSE FINGERSTICK   POCT GLUCOSE FINGERSTICK   POCT GLUCOSE FINGERSTICK   POCT GLUCOSE FINGERSTICK   CBC AND DIFFERENTIAL    Narrative:     The following orders were created for panel order CBC & Differential.  Procedure                               Abnormality         Status                     ---------                               -----------         ------                     CBC Auto Differential[[id number]]        Abnormal            Final result                 Please view results for these tests on the individual orders.   GREEN TOP   LAVENDER TOP   GOLD TOP - SST   GRAY TOP   LIGHT BLUE TOP       Meds Given in ED:   Medications   sodium chloride 0.9 % flush 10 mL (has no administration in time range)   aspirin EC tablet 81 mg (has no administration in time range)   pantoprazole (PROTONIX) EC tablet 40 mg (has no administration in time range)   prasugrel (EFFIENT) tablet 10 mg (has no administration in time range)   rosuvastatin (CRESTOR) tablet 40 mg (has no administration in time range)   sodium chloride 0.9 % flush 10 mL (has no administration in time range)   sodium chloride 0.9 % flush 10 mL (has no administration in time range)   sodium chloride 0.9 % infusion 40 mL (has no administration in time range)   dextrose (GLUTOSE) oral gel 15  g (has no administration in time range)   dextrose (D50W) (25 g/50 mL) IV injection 25 g (has no administration in time range)   glucagon (GLUCAGEN) injection 1 mg (has no administration in time range)   insulin regular (humuLIN R,novoLIN R) injection 2-7 Units ( Subcutaneous Not Given 2/18/25 0704)   nitroglycerin (NITROSTAT) SL tablet 0.4 mg (has no administration in time range)   sodium chloride 0.9 % infusion (has no administration in time range)   Potassium Replacement - Follow Nurse / BPA Driven Protocol (has no administration in time range)   Magnesium Standard Dose Replacement - Follow Nurse / BPA Driven Protocol (has no administration in time range)   acetaminophen (TYLENOL) tablet 650 mg (has no administration in time range)   sennosides-docusate (PERICOLACE) 8.6-50 MG per tablet 2 tablet (has no administration in time range)     And   polyethylene glycol (MIRALAX) packet 17 g (has no administration in time range)     And   bisacodyl (DULCOLAX) EC tablet 5 mg (has no administration in time range)     And   bisacodyl (DULCOLAX) suppository 10 mg (has no administration in time range)   ondansetron (ZOFRAN) injection 4 mg (has no administration in time range)   melatonin tablet 5 mg (has no administration in time range)   hydrOXYzine (ATARAX) tablet 25 mg (has no administration in time range)   aspirin chewable tablet 324 mg (324 mg Oral Given 2/18/25 0206)     sodium chloride, 75 mL/hr         Last NIH score:                                                          Dysphagia screening results:        Audrey Coma Scale:  No data recorded     CIWA:        Restraint Type:            Isolation Status:  No active isolations

## 2025-02-18 NOTE — PROGRESS NOTES
[facility] Medicine Services  ADMISSION FOLLOW-UP NOTE          Patient admitted after midnight, H&P by my partner performed earlier on today's date reviewed.  Interim findings, labs, and charting also reviewed.        The [facility] Hospital Problem List has been managed and updated to include any new diagnoses:  Active Hospital Problems    Diagnosis  POA    **Chest pain [R07.9]  Yes    CAD (coronary artery disease) [I25.10]  Yes    T2DM (type 2 diabetes mellitus) [E11.9]  Yes    Acute chest pain [R07.9]  Yes    Anxiety [F41.9]  Yes    Solitary kidney, congenital [Q60.0]  Not Applicable    Hyperlipemia [E78.5]  Yes    GERD (gastroesophageal reflux disease) [K21.9]  Yes    Essential hypertension [I10]  Yes      Resolved Hospital Problems   No resolved problems to display.         ADDITIONAL PLAN:  - detailed assessment and plan from admission reviewed      Chest pain   CAD (s/p stent x2)  HTN, HLD  -Patient with intermittent chest pain for the past 2-3 weeks.  Reports the chest pain is intermittent and random.  No correlation with exercise or decrease in exercise tolerance.  Reports mild shortness of breath  -pt follows w/ Dr. Feng   -s/p stress test on 2/12/25 (w/ Dr. Kelly), normal  -last cardiac cath in 2022 (w/ intervention)  -ECHO 9/2022: EF 51-55%  -CXR unremarkable   -troponin 11, 11  -proBNP <36.0  -EKG without ischemic changes  PLAN  -Cardiology consulted, appreciate recs.  Plan for left heart cath today.  Will remain n.p.o. until procedure.  -Continue aspirin, Effient, statin  -Continue IV fluids for now     T2DM  -A1c = 7.5, increased respiratory to  -holding routine Metformin   -FSBG q 6 hours w/ LDSS (q 6 hrs while NPO)      GERD  -continue PPI     Anxiety   -continue PRN atarax      Solitary kidney, congenital     Expected Discharge   Expected Discharge Date: 2/20/2025; Expected Discharge Time:      Richard Gonzalez DO  02/18/25

## 2025-02-18 NOTE — DISCHARGE SUMMARY
[facility] Medicine Services  DISCHARGE SUMMARY    Patient Name: Geovanny Barnett  : 1970  MRN: [id number]    Date of Admission: 2025  5:38 AM  Date of Discharge: 2025  Primary Care Physician: Champ Feng MD    Consults       Date and Time Order Name Status Description    2025  6:28 AM Inpatient Cardiology Consult Completed             Hospital Course     Presenting Problem: Chest pain    Active Hospital Problems    Diagnosis  POA    CAD (coronary artery disease) [I25.10]  Yes    T2DM (type 2 diabetes mellitus) [E11.9]  Yes    Anxiety [F41.9]  Yes    Solitary kidney, congenital [Q60.0]  Not Applicable    Hyperlipemia [E78.5]  Yes    GERD (gastroesophageal reflux disease) [K21.9]  Yes    Essential hypertension [I10]  Yes      Resolved Hospital Problems    Diagnosis Date Resolved POA    **Chest pain [R07.9] 2025 Yes    Acute chest pain [R07.9] 2025 Yes          Hospital Course:  Geovanny Barnett is a 55 y.o. male w/ a hx of CAD (s/p stent x2), HTN, HLD, GERD, T2DM, congential solitary kidney, anxiety who presented to the ED w/ c/o chest pain. Patient with intermittent chest pain for the past 2-3 weeks.  Reports the chest pain is intermittent and random.  No correlation with exercise or decrease in exercise tolerance.  Reports mild shortness of breath.  Cardiology consulted upon admission.  Status post left heart cath on  that revealed no flow-limiting CAD, widely patent LAD stents, normal left ventricular ejection fraction.  Patient to continue aspirin, Effient, statin.  Patient to follow-up with cardiology in 3 weeks.  Follow-up with PCP in 1 week.  Further details documented below.    Chest pain   CAD (s/p stent x2)  HTN, HLD  -Patient with intermittent chest pain for the past 2-3 weeks.  Reports the chest pain is intermittent and random.  No correlation with exercise or decrease in exercise tolerance.  Reports mild shortness of breath  -pt  follows w/ Dr. Feng   -s/p stress test on 2/12/25 (w/ Dr. Kelly), normal  -last cardiac cath in 2022 (w/ intervention)  -ECHO 9/2022: EF 51-55%  -CXR unremarkable   -troponin 11, 11  -proBNP <36.0  -EKG without ischemic changes  PLAN  -Cardiology consulted, appreciate recs.  Status post left heart cath in 2/18 that revealed no flow-limiting CAD, widely patent LAD stents, normal left ventricular ejection fraction.  -Follow-up with Dr. Kelly in 3 weeks  -Continue aspirin, Effient, statin  -Chest pain seems to be MSK in etiology as patient is able to palpate area of chest pain.     T2DM  -A1c = 7.5  -Continue home medications     GERD  -continue PPI     Anxiety   -continue PRN atarax      Solitary kidney, congenital       Discharge Follow Up Recommendations for outpatient labs/diagnostics:  Continue aspirin, Effient, statin.  Follow-up with Dr. Kelly in 3 weeks.  Follow-up with PCP in 1 week.    Day of Discharge     HPI:   Patient seen resting comfortably in bed no acute distress.  Denies any chest pain currently.  Discussed results of cath with patient.  All questions answered.        Vital Signs:   Temp:  [97.8 °F (36.6 °C)-98.1 °F (36.7 °C)] 98.1 °F (36.7 °C)  Heart Rate:  [58-87] 69  Resp:  [14-18] 16  BP: (105-132)/(73-95) 118/78      Physical Exam  Constitutional:       General: He is not in acute distress.  Cardiovascular:      Rate and Rhythm: Normal rate.      Pulses: Normal pulses.   Pulmonary:      Effort: Pulmonary effort is normal. No respiratory distress.   Abdominal:      General: There is no distension.      Palpations: Abdomen is soft.      Tenderness: There is no abdominal tenderness. There is no guarding or rebound.   Musculoskeletal:      Right lower leg: No edema.      Left lower leg: No edema.   Skin:     General: Skin is warm.   Neurological:      Mental Status: He is alert and oriented to person, place, and time.   Psychiatric:         Mood and Affect: Mood normal.         Behavior:  Behavior normal.          Pertinent  and/or Most Recent Results     LAB RESULTS:      Lab 02/18/25  0646 02/18/25  0207   WBC  --  8.64   HEMOGLOBIN  --  15.2   HEMATOCRIT  --  45.5   PLATELETS  --  222   NEUTROS ABS  --  4.69   IMMATURE GRANS (ABS)  --  0.03   LYMPHS ABS  --  3.37*   MONOS ABS  --  0.37   EOS ABS  --  0.15   MCV  --  88.7   PROTIME 13.5  --    APTT 28.0  --          Lab 02/18/25  0646 02/18/25  0207   SODIUM  --  139   POTASSIUM  --  4.0   CHLORIDE  --  103   CO2  --  22.0   ANION GAP  --  14.0   BUN  --  19   CREATININE  --  1.08   EGFR  --  80.0   GLUCOSE  --  214*   CALCIUM  --  9.9   MAGNESIUM 2.1  --    HEMOGLOBIN A1C 7.50*  --    TSH 3.900  --          Lab 02/18/25  0207   TOTAL PROTEIN 7.4   ALBUMIN 4.6   GLOBULIN 2.8   ALT (SGPT) 35   AST (SGOT) 28   BILIRUBIN 0.5   ALK PHOS 72   LIPASE 46         Lab 02/18/25  0646 02/18/25  0429 02/18/25  0207   PROBNP  --   --  <36.0   HSTROP T 11 11 11   PROTIME 13.5  --   --    INR 1.02  --   --          Lab 02/18/25  0646   CHOLESTEROL 80   LDL CHOL 14   HDL CHOL 32*   TRIGLYCERIDES 224*             Brief Urine Lab Results       None          Microbiology Results (last 10 days)       ** No results found for the last 240 hours. **            Cardiac Catheterization/Vascular Study    Result Date: 2/18/2025    No flow-limiting coronary artery disease   Widely patent LAD stents   Normal LVEF     XR Chest 1 View    Result Date: 2/18/2025  XR CHEST 1 VW Date of Exam: 2/18/2025 3:04 AM EST Indication: Chest Pain Triage Protocol Comparison: Chest radiograph 2/3/2025 Findings: There are no airspace consolidations. No pleural fluid. No pneumothorax. The pulmonary vasculature appears within normal limits. The cardiac and mediastinal silhouette appear unremarkable. No acute osseous abnormality identified.     Impression: No active disease. Electronically Signed: Chadwick Steele MD  2/18/2025 3:12 AM EST  Workstation ID: [id number]    XR Chest 1 View    Result Date:  2/13/2025  PORTABLE CHEST.    2/13/2025 1:58 AM HISTORY: Acute precordial chest pain, vomiting. COMPARISON: January 2020. FINDINGS: The cardiac silhouette is normal in size. The mediastinum is unremarkable. The lungs are clear. There is no pneumothorax.    No acute cardiopulmonary process. Images reviewed, interpreted, and dictated by Dr. Aneta Escobedo. Transcribed by Liat Bone PA-C.     Results for orders placed during the hospital encounter of 10/08/22    Duplex Carotid Ultrasound CAR    Interpretation Summary    Right internal carotid artery stenosis of 0-49%.    Left internal carotid artery stenosis of 0-49%.      Results for orders placed during the hospital encounter of 10/08/22    Duplex Carotid Ultrasound CAR    Interpretation Summary    Right internal carotid artery stenosis of 0-49%.    Left internal carotid artery stenosis of 0-49%.      Results for orders placed during the hospital encounter of 09/13/22    Adult Transthoracic Echo Complete W/ Cont if Necessary Per Protocol    Interpretation Summary  · Left ventricular ejection fraction appears to be 51 - 55%.  · Estimated right ventricular systolic pressure from tricuspid regurgitation is normal (<35 mmHg). Calculated right ventricular systolic pressure from tricuspid regurgitation is 20 mmHg.      Plan for Follow-up of Pending Labs/Results: N/A    Discharge Details        Discharge Medications        Changes to Medications        Instructions Start Date   prasugrel 10 MG tablet  Commonly known as: EFFIENT  What changed: additional instructions   10 mg, Oral, Daily   Start Date: February 19, 2025            Continue These Medications        Instructions Start Date   ALLOPURINOL PO   300 mg, Daily      aspirin 81 MG EC tablet   81 mg, Daily      ezetimibe 10 MG tablet  Commonly known as: ZETIA   10 mg, Daily      famotidine 20 MG tablet  Commonly known as: PEPCID   20 mg, Oral, 2 Times Daily      hydrOXYzine 25 MG tablet  Commonly known as:  ATARAX   25 mg, Every 6 Hours PRN      metFORMIN 500 MG tablet  Commonly known as: GLUCOPHAGE   500 mg, 2 Times Daily With Meals      omeprazole 20 MG capsule  Commonly known as: priLOSEC   20 mg, Daily      oxyCODONE-acetaminophen 5-325 MG per tablet  Commonly known as: PERCOCET   1 tablet, Oral, Every 4 Hours PRN      rosuvastatin 40 MG tablet  Commonly known as: CRESTOR   40 mg, Nightly      sildenafil 100 MG tablet  Commonly known as: VIAGRA   100 mg, Oral, Daily PRN      sucralfate 1 g tablet  Commonly known as: CARAFATE   1 g, Oral, 4 Times Daily             Stop These Medications      nitroglycerin 0.4 MG SL tablet  Commonly known as: NITROSTAT            ASK your doctor about these medications        Instructions Start Date   docusate sodium 250 MG capsule  Commonly known as: COLACE   250 mg, Oral, 2 Times Daily      ondansetron 4 MG tablet  Commonly known as: ZOFRAN   4 mg, Oral, Every 8 Hours PRN               No Known Allergies      Discharge Disposition:  Home or Self Care    Diet:  Hospital:  Diet Order   Procedures    NPO Diet NPO Type: Strict NPO            Activity: As tolerated      Restrictions or Other Recommendations:  As tolerated       CODE STATUS:    Code Status and Medical Interventions: CPR (Attempt to Resuscitate); Full Support   Ordered at: 02/18/25 0508     Code Status (Patient has no pulse and is not breathing):    CPR (Attempt to Resuscitate)     Medical Interventions (Patient has pulse or is breathing):    Full Support       No future appointments.    Additional Instructions for the Follow-ups that You Need to Schedule       Discharge Follow-up with PCP   As directed       Currently Documented PCP:    Champ Feng MD    PCP Phone Number:    [phone]     Follow Up Details: Follow-up with PCP in 1 week        Discharge Follow-up with Specialty: Dr. Kelly; 3 Months   As directed      Specialty: Dr. Kelly   Follow Up: 3 Months        Discharge Follow-up with Specialty:  Follow-up with Dr. Kelly, cardiology, 3 weeks   As directed      Specialty: Follow-up with Dr. Kelly, cardiology, 3 weeks                      Richard Gonzalez DO  02/18/25      Time Spent on Discharge:  I spent  35  minutes on this discharge activity which included: face-to-face encounter with the patient, reviewing the data in the system, coordination of the care with the nursing staff as well as consultants, documentation, and entering orders.

## 2025-02-20 NOTE — CONSULTS
Diabetes Education    Patient Name:  Geovanny Barnett  YOB: 1970  MRN: [id number]  Admit Date:  2/18/2025        Consult received for diabetes education. Chart reviewed. A1c is 7.5% with a history of diabetes. Patient discharged, mailed missed patient letter.       Electronically signed by:  Patricia Acosta RN  02/20/25 10:23 EST

## 2025-02-22 LAB
QT INTERVAL: 398 MS
QT INTERVAL: 402 MS
QTC INTERVAL: 430 MS
QTC INTERVAL: 444 MS

## (undated) DEVICE — ST EXT IV TBG W SECUR LK 20IN

## (undated) DEVICE — PROB ESOPH COMFORTEC IMPEDANCE NON/INF 6F 2.3 18CM

## (undated) DEVICE — ENDOPATH XCEL UNIVERSAL TROCAR STABLILITY SLEEVES: Brand: ENDOPATH XCEL

## (undated) DEVICE — CATH CHOLANG 7.5F18IN BLU

## (undated) DEVICE — ENDOPATH XCEL BLADELESS TROCARS WITH STABILITY SLEEVES: Brand: ENDOPATH XCEL

## (undated) DEVICE — LAPAROSCOPIC SMOKE FILTRATION SYSTEM: Brand: PALL LAPAROSHIELD® PLUS LAPAROSCOPIC SMOKE FILTRATION SYSTEM

## (undated) DEVICE — CATH DIAG EXPO M/ PK 5F FL4/FR4 PIG

## (undated) DEVICE — SYR LUERLOK 20CC BX/50

## (undated) DEVICE — ENDOPOUCH RETRIEVER SPECIMEN RETRIEVAL BAGS: Brand: ENDOPOUCH RETRIEVER

## (undated) DEVICE — SUT SILK 2/0 TIES 18IN A185H

## (undated) DEVICE — SNAP KOVER: Brand: UNBRANDED

## (undated) DEVICE — NDL ANGIOGR ADV THN SMOTH SGLWALL 21G 1.5

## (undated) DEVICE — GOWN,PREVENTION PLUS,XXLARGE,STERILE: Brand: MEDLINE

## (undated) DEVICE — MODEL BT2000 P/N 700287-012KIT CONTENTS: MANIFOLD WITH SALINE AND CONTRAST PORTS, SALINE TUBING WITH SPIKE AND HAND SYRINGE, TRANSDUCER: Brand: BT2000 AUTOMATED MANIFOLD KIT

## (undated) DEVICE — CATH DIAG EXPO .056 FL3.5 6F 100CM

## (undated) DEVICE — INTRO SHEATH PRELUDE IDEAL SPRNG COIL 021 6F 23X80CM

## (undated) DEVICE — [HIGH FLOW INSUFFLATOR,  DO NOT USE IF PACKAGE IS DAMAGED,  KEEP DRY,  KEEP AWAY FROM SUNLIGHT,  PROTECT FROM HEAT AND RADIOACTIVE SOURCES.]: Brand: PNEUMOSURE

## (undated) DEVICE — FEEDING TUBE: Brand: ARGYLE

## (undated) DEVICE — GUIDE CATHETER: Brand: MACH1™

## (undated) DEVICE — GW PERIPH GUIDERIGHT STD/EXCHNG/J/TIP SS 0.035IN 5X260CM

## (undated) DEVICE — ADULT, W/LG. BACK PAD, RADIOTRANSPARENT ELEMENT AND LEAD WIRE COMPATIBLE W/: Brand: DEFIBRILLATION ELECTRODES

## (undated) DEVICE — TREK CORONARY DILATATION CATHETER 2.50 MM X 15 MM / RAPID-EXCHANGE: Brand: TREK

## (undated) DEVICE — PATIENT RETURN ELECTRODE, SINGLE-USE, CONTACT QUALITY MONITORING, ADULT, WITH 9FT CORD, FOR PATIENTS WEIGING OVER 33LBS. (15KG): Brand: MEGADYNE

## (undated) DEVICE — SUT VIC 0 UR6 27IN VCP603H

## (undated) DEVICE — PK LAP LASR CHOLE 10

## (undated) DEVICE — GLV SURG SENSICARE PI MIC PF SZ7.5 LF STRL

## (undated) DEVICE — PK CATH CARD 10

## (undated) DEVICE — ANTIBACTERIAL UNDYED BRAIDED (POLYGLACTIN 910), SYNTHETIC ABSORBABLE SUTURE: Brand: COATED VICRYL

## (undated) DEVICE — GLV SURG SENSICARE PI MIC PF SZ7 LF STRL

## (undated) DEVICE — ENDOCUT SCISSOR TIP, DISPOSABLE: Brand: RENEW

## (undated) DEVICE — LAPAROVUE VISIBILITY SYSTEM LAPAROSCOPIC SOLUTIONS: Brand: LAPAROVUE

## (undated) DEVICE — DEV INFL MONARCH 25W

## (undated) DEVICE — SYR LUERLOK 30CC

## (undated) DEVICE — NC TREK CORONARY DILATATION CATHETER 4.0 MM X 15 MM / RAPID-EXCHANGE: Brand: NC TREK

## (undated) DEVICE — GLIDESHEATH BASIC HYDROPHILIC COATED INTRODUCER SHEATH: Brand: GLIDESHEATH

## (undated) DEVICE — CATH DIAG EXPO .045 FL3  5F 100CM

## (undated) DEVICE — GW INQWIRE FC PTFE STD J/1.5 .035 260

## (undated) DEVICE — MODEL AT P65, P/N 701554-001KIT CONTENTS: HAND CONTROLLER, 3-WAY HIGH-PRESSURE STOPCOCK WITH ROTATING END AND PREMIUM HIGH-PRESSURE TUBING: Brand: ANGIOTOUCH® KIT

## (undated) DEVICE — HLDR TBG NG

## (undated) DEVICE — CATH DIAG EXPO M/ PK 6FR FL4/FR4 PIG 3PK

## (undated) DEVICE — STPCK 4WY ON/OFF VLV M/COLAR FIT 45PSI STRL

## (undated) DEVICE — GW LUGE .014 182 CM

## (undated) DEVICE — TR BAND RADIAL ARTERY COMPRESSION DEVICE: Brand: TR BAND

## (undated) DEVICE — PK SOL VISC ESOPH 80ML

## (undated) DEVICE — DEV COMP RAD PRELUDESYNC 24CM